# Patient Record
Sex: MALE | Race: WHITE
[De-identification: names, ages, dates, MRNs, and addresses within clinical notes are randomized per-mention and may not be internally consistent; named-entity substitution may affect disease eponyms.]

---

## 2019-03-04 ENCOUNTER — HOSPITAL ENCOUNTER (INPATIENT)
Dept: HOSPITAL 41 - JD.ED | Age: 79
LOS: 3 days | Discharge: NURSING, REHAB OR LONG-TERM CARE | DRG: 64 | End: 2019-03-07
Attending: INTERNAL MEDICINE | Admitting: INTERNAL MEDICINE
Payer: MEDICARE

## 2019-03-04 DIAGNOSIS — M19.90: ICD-10-CM

## 2019-03-04 DIAGNOSIS — R20.0: ICD-10-CM

## 2019-03-04 DIAGNOSIS — K25.9: ICD-10-CM

## 2019-03-04 DIAGNOSIS — E78.5: ICD-10-CM

## 2019-03-04 DIAGNOSIS — F41.9: ICD-10-CM

## 2019-03-04 DIAGNOSIS — H54.7: ICD-10-CM

## 2019-03-04 DIAGNOSIS — F32.9: ICD-10-CM

## 2019-03-04 DIAGNOSIS — C61: ICD-10-CM

## 2019-03-04 DIAGNOSIS — K26.4: ICD-10-CM

## 2019-03-04 DIAGNOSIS — I21.4: ICD-10-CM

## 2019-03-04 DIAGNOSIS — K44.9: ICD-10-CM

## 2019-03-04 DIAGNOSIS — K21.9: ICD-10-CM

## 2019-03-04 DIAGNOSIS — S61.511A: ICD-10-CM
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
## 2019-03-04 DIAGNOSIS — F17.210: ICD-10-CM

## 2019-03-04 DIAGNOSIS — D49.0: ICD-10-CM

## 2019-03-04 DIAGNOSIS — Z79.899: ICD-10-CM

## 2019-03-04 DIAGNOSIS — E87.6: ICD-10-CM

## 2019-03-04 DIAGNOSIS — I63.9: Primary | ICD-10-CM

## 2019-03-04 DIAGNOSIS — E86.0: ICD-10-CM

## 2019-03-04 DIAGNOSIS — G81.01: ICD-10-CM

## 2019-03-04 DIAGNOSIS — I10: ICD-10-CM

## 2019-03-04 DIAGNOSIS — D50.0: ICD-10-CM

## 2019-03-04 DIAGNOSIS — G81.91: ICD-10-CM

## 2019-03-04 DIAGNOSIS — S61.411A: ICD-10-CM

## 2019-03-04 DIAGNOSIS — R53.1: ICD-10-CM

## 2019-03-04 LAB — APAP SERPL-MCNC: 0 UG/ML (ref 10–30)

## 2019-03-04 PROCEDURE — G0480 DRUG TEST DEF 1-7 CLASSES: HCPCS

## 2019-03-04 PROCEDURE — 85610 PROTHROMBIN TIME: CPT

## 2019-03-04 PROCEDURE — 85025 COMPLETE CBC W/AUTO DIFF WBC: CPT

## 2019-03-04 PROCEDURE — P9016 RBC LEUKOCYTES REDUCED: HCPCS

## 2019-03-04 PROCEDURE — 85730 THROMBOPLASTIN TIME PARTIAL: CPT

## 2019-03-04 PROCEDURE — 83735 ASSAY OF MAGNESIUM: CPT

## 2019-03-04 PROCEDURE — 80053 COMPREHEN METABOLIC PANEL: CPT

## 2019-03-04 PROCEDURE — A9577 INJ MULTIHANCE: HCPCS

## 2019-03-04 PROCEDURE — 82962 GLUCOSE BLOOD TEST: CPT

## 2019-03-04 PROCEDURE — 81001 URINALYSIS AUTO W/SCOPE: CPT

## 2019-03-04 PROCEDURE — 96365 THER/PROPH/DIAG IV INF INIT: CPT

## 2019-03-04 PROCEDURE — 99285 EMERGENCY DEPT VISIT HI MDM: CPT

## 2019-03-04 PROCEDURE — 84443 ASSAY THYROID STIM HORMONE: CPT

## 2019-03-04 PROCEDURE — 36415 COLL VENOUS BLD VENIPUNCTURE: CPT

## 2019-03-04 PROCEDURE — C9113 INJ PANTOPRAZOLE SODIUM, VIA: HCPCS

## 2019-03-04 PROCEDURE — 93005 ELECTROCARDIOGRAM TRACING: CPT

## 2019-03-04 PROCEDURE — 80306 DRUG TEST PRSMV INSTRMNT: CPT

## 2019-03-04 PROCEDURE — 84484 ASSAY OF TROPONIN QUANT: CPT

## 2019-03-04 RX ADMIN — POTASSIUM CHLORIDE SCH MEQ: 1500 TABLET, EXTENDED RELEASE ORAL at 21:11

## 2019-03-04 NOTE — EDM.PDOC
ED HPI GENERAL MEDICAL PROBLEM





- General


Chief Complaint: Neuro Symptoms/Deficits


Stated Complaint: [facility]


Time Seen by Provider: 03/04/19 16:08


Source of Information: Reports: Patient, EMS


History Limitations: Reports: No Limitations





- History of Present Illness


INITIAL COMMENTS - FREE TEXT/NARRATIVE: 





The patient presents by [facility] for a stroke.  The patient says 

this started Thursday night right after dinner.  His last time know well was 

6pm.  His right arm and leg quit working.  He has severe weakness to his right 

arm and moderate weakness to his right leg.  He knew he had a stroke he went to 

bed and did not call anyone for help.  He lives alone.  He got depressed on 

Saturday and cut his arm in 3 places.  Twice to the right wrist and once to the 

right AC.  He was trying to kill himself but he did not bleed that much.  He 

has no headache, fever, chills, cough, chest pain, shortness of breath, 

abdominal pain, nausea or vomiting.  He called 911 for help.  He has to crawl 

in his house to get around.  He new cutting himself was wrong.  He has not 

eaten or drank much the past few days.  He has prostate cancer and he is on 

medication for that and high blood pressure.  His PSA has gone up so his 

urologist is coming up with a different plan.  He does admit to drinking the 

past few days.


Onset: Sudden


Duration: Day(s): (5)


Severity: Severe


Improves with: Reports: None


Worsens with: Reports: None


Associated Symptoms: Reports: No Other Symptoms





- Related Data


 Allergies











Allergy/AdvReac Type Severity Reaction Status Date / Time


 


No Known Allergies Allergy   Verified 03/04/19 16:13











Home Meds: 


 Home Meds





Degarelix [Firmagon] 80 mg SQ ASDIRECTED 03/04/19 [History]


Losartan/Hydrochlorothiazide [Hyzaar 100-12.5 Tablet] 1 each PO DAILY 03/04/19 [

History]


amLODIPine Besylate [Amlodipine Besylate] 5 mg PO DAILY 03/04/19 [History]











Past Medical History


Cardiovascular History: Reports: Hypertension


Oncologic (Cancer) History: Reports: Prostate





- Past Surgical History


Male  Surgical History: Reports: Other (See Below)


Other Male  Surgeries/Procedures: patient has seeds placed for prostrate 

cancer.





Social & Family History





- Tobacco Use


Smoking Status *Q: Current Every Day Smoker


Years of Tobacco use: 62


Packs/Tins Daily: 1


Used Tobacco, but Quit: No





- Caffeine Use


Caffeine Use: Reports: Coffee, Soda





- Alcohol Use


Number of Drinks Per Day: 4


Date of Last Drink: 03/04/19





- Recreational Drug Use


Recreational Drug Use: No





ED ROS GENERAL





- Review of Systems


Review Of Systems: See Below


Constitutional: Reports: No Symptoms


HEENT: Reports: No Symptoms


Respiratory: Reports: No Symptoms


Cardiovascular: Reports: No Symptoms


Endocrine: Reports: No Symptoms


GI/Abdominal: Reports: No Symptoms


: Reports: No Symptoms


Neurological: Reports: Other (Right arm and leg weakness with numbness)





ED EXAM, NEURO





- Physical Exam


Exam: See Below


Exam Limited By: No Limitations


General Appearance: Alert, No Apparent Distress


Ears: Normal External Exam


Nose: Normal Inspection


Head Exam: Atraumatic, Normocephalic


Neck: Normal Inspection


Respiratory/Chest: No Respiratory Distress, Lungs Clear, Normal Breath Sounds


Cardiovascular: Regular Rate, Rhythm, No Edema, No Murmur


GI/Abdominal: Soft, Non-Tender, No Organomegaly, No Mass


Neurological: Alert, Other (Severe weakness to his hand and wrist on the right.

  Moderate weakness to the shoulder.  He can lift his arm up.  He has moderate 

weakness to his right leg.)


Extremities: Other (1cm laceration to the right AC, two 1cm lacerations to the 

right wrist)





EKG INTERPRETATION


EKG Date: 03/04/19


Time: 16:24


Rhythm: Other (sinus tachycardia)


Rate (Beats/Min): 120


Axis: Normal


P-Wave: Present


QRS: Normal


ST-T: Depressed (Global ST depression)


QT: Normal





Course





- Vital Signs


Last Recorded V/S: 


 Last Vital Signs











Temp  98.5 F   03/04/19 16:06


 


Pulse  121 H  03/04/19 16:06


 


Resp  18   03/04/19 16:06


 


BP  137/84   03/04/19 16:06


 


Pulse Ox  98   03/04/19 16:06














- Orders/Labs/Meds


Orders: 


 Active Orders 24 hr











 Category Date Time Status


 


 Cardiac Monitoring [RC] .AS DIRECTED Care  03/04/19 16:09 Active


 


 EKG Documentation Completion [RC] STAT Care  03/04/19 16:09 Active


 


 Peripheral IV Care [RC] .AS DIRECTED Care  03/04/19 16:09 Active


 


 Head wo Cont [CT] Routine Exams  03/04/19 16:11 Taken


 


 DRUG SCREEN, URINE [URCHEM] Stat Lab  03/04/19 17:52 Ordered


 


 DRUG SCREEN, URINE [URCHEM] Stat Lab  03/04/19 17:52 Ordered


 


 NS + KCl 20mEq/L [Normal Saline with 20 mEq KCl] 1,000 Med  03/04/19 18:00 

Active





 ml   





 IV ASDIRECTED   


 


 Sodium Chloride 0.9% [Saline Flush] Med  03/04/19 16:09 Active





 10 ml FLUSH ASDIRECTED PRN   


 


 Peripheral IV Insertion Adult [OM.PC] Stat Oth  03/04/19 16:09 Ordered








 Medication Orders





Potassium Chloride/Sodium Chloride (Normal Saline With 20 Meq Kcl)  1,000 mls @ 

150 mls/hr IV ASDIRECTED NICOLETTE


   Last Admin: 03/04/19 18:03  Dose: 150 mls/hr


Sodium Chloride (Saline Flush)  10 ml FLUSH ASDIRECTED PRN


   PRN Reason: Keep Vein Open


   Last Admin: 03/04/19 16:28  Dose: 10 ml








Labs: 


 Laboratory Tests











  03/04/19 03/04/19 03/04/19 Range/Units





  16:15 16:35 16:35 


 


WBC   12.79 H   (4.23-9.07)  K/mm3


 


RBC   3.98 L   (4.63-6.08)  M/mm3


 


Hgb   11.9 L   (13.7-17.5)  gm/L


 


Hct   35.4 L   (40.1-51.0)  %


 


MCV   88.9   (79.0-92.2)  fl


 


MCH   29.9   (25.7-32.2)  pg


 


MCHC   33.6   (32.2-35.5)  g/dl


 


RDW Std Deviation   44.6 H   (35.1-43.9)  fL


 


Plt Count   278   (163-337)  K/mm3


 


MPV   10.1   (9.4-12.3)  fl


 


Neut % (Auto)   77.7 H   (34.0-67.9)  %


 


Lymph % (Auto)   14.5 L   (21.8-53.1)  %


 


Mono % (Auto)   7.2   (5.3-12.2)  %


 


Eos % (Auto)   0.2 L   (0.8-7.0)  


 


Baso % (Auto)   0.2   (0.1-1.2)  %


 


Neut # (Auto)   9.95 H   (1.78-5.38)  K/mm3


 


Lymph # (Auto)   1.86   (1.32-3.57)  K/mm3


 


Mono # (Auto)   0.92 H   (0.30-0.82)  K/mm3


 


Eos # (Auto)   0.02 L   (0.04-0.54)  K/mm3


 


Baso # (Auto)   0.02   (0.01-0.08)  K/mm3


 


PT    10.9  (9.5-12.1)  SECONDS


 


INR    1.00  


 


APTT    25  (24-31)  SECONDS


 


Sodium     (136-145)  mEq/L


 


Potassium     (3.5-5.1)  mEq/L


 


Chloride     ()  mEq/L


 


Carbon Dioxide     (21-32)  mEq/L


 


Anion Gap     (5-15)  


 


BUN     (7-18)  mg/dL


 


Creatinine     (0.7-1.3)  mg/dL


 


Est Cr Clr Drug Dosing     mL/min


 


Estimated GFR (MDRD)     (>60)  mL/min


 


BUN/Creatinine Ratio     (14-18)  


 


Glucose     ()  mg/dL


 


POC Glucose  124 H    ()  mg/dL


 


Calcium     (8.5-10.1)  mg/dL


 


Total Bilirubin     (0.2-1.0)  mg/dL


 


AST     (15-37)  U/L


 


ALT     (16-63)  U/L


 


Alkaline Phosphatase     ()  U/L


 


Troponin I     (0.00-0.056)  ng/mL


 


Total Protein     (6.4-8.2)  g/dl


 


Albumin     (3.4-5.0)  g/dl


 


Globulin     gm/dL


 


Albumin/Globulin Ratio     (1-2)  


 


TSH 3rd Generation     (0.358-3.74)  uIU/mL


 


Salicylates     (2.8-20)  mg/dL


 


Acetaminophen     (10-30)  ug/mL


 


Ethyl Alcohol     (0.00)  gm%














  03/04/19 03/04/19 Range/Units





  16:35 16:35 


 


WBC    (4.23-9.07)  K/mm3


 


RBC    (4.63-6.08)  M/mm3


 


Hgb    (13.7-17.5)  gm/L


 


Hct    (40.1-51.0)  %


 


MCV    (79.0-92.2)  fl


 


MCH    (25.7-32.2)  pg


 


MCHC    (32.2-35.5)  g/dl


 


RDW Std Deviation    (35.1-43.9)  fL


 


Plt Count    (163-337)  K/mm3


 


MPV    (9.4-12.3)  fl


 


Neut % (Auto)    (34.0-67.9)  %


 


Lymph % (Auto)    (21.8-53.1)  %


 


Mono % (Auto)    (5.3-12.2)  %


 


Eos % (Auto)    (0.8-7.0)  


 


Baso % (Auto)    (0.1-1.2)  %


 


Neut # (Auto)    (1.78-5.38)  K/mm3


 


Lymph # (Auto)    (1.32-3.57)  K/mm3


 


Mono # (Auto)    (0.30-0.82)  K/mm3


 


Eos # (Auto)    (0.04-0.54)  K/mm3


 


Baso # (Auto)    (0.01-0.08)  K/mm3


 


PT    (9.5-12.1)  SECONDS


 


INR    


 


APTT    (24-31)  SECONDS


 


Sodium  140   (136-145)  mEq/L


 


Potassium  2.5 L   (3.5-5.1)  mEq/L


 


Chloride  99   ()  mEq/L


 


Carbon Dioxide  32   (21-32)  mEq/L


 


Anion Gap  11.5   (5-15)  


 


BUN  45 H   (7-18)  mg/dL


 


Creatinine  1.3   (0.7-1.3)  mg/dL


 


Est Cr Clr Drug Dosing  39.69   mL/min


 


Estimated GFR (MDRD)  54   (>60)  mL/min


 


BUN/Creatinine Ratio  34.6 H   (14-18)  


 


Glucose  116 H   ()  mg/dL


 


POC Glucose    ()  mg/dL


 


Calcium  8.8   (8.5-10.1)  mg/dL


 


Total Bilirubin  1.2 H   (0.2-1.0)  mg/dL


 


AST  21   (15-37)  U/L


 


ALT  18   (16-63)  U/L


 


Alkaline Phosphatase  78   ()  U/L


 


Troponin I  < 0.017   (0.00-0.056)  ng/mL


 


Total Protein  6.4   (6.4-8.2)  g/dl


 


Albumin  3.1 L   (3.4-5.0)  g/dl


 


Globulin  3.3   gm/dL


 


Albumin/Globulin Ratio  0.9 L   (1-2)  


 


TSH 3rd Generation  2.290   (0.358-3.74)  uIU/mL


 


Salicylates   0.5 L  (2.8-20)  mg/dL


 


Acetaminophen  0 L   (10-30)  ug/mL


 


Ethyl Alcohol  0.00   (0.00)  gm%











Meds: 


Medications











Generic Name Dose Route Start Last Admin





  Trade Name Freq  PRN Reason Stop Dose Admin


 


Potassium Chloride/Sodium Chloride  1,000 mls @ 150 mls/hr  03/04/19 18:00  03/ 04/19 18:03





  Normal Saline With 20 Meq Kcl  IV   150 mls/hr





  ASDIRECTED NICOLETTE   Administration





     





     





     





     


 


Sodium Chloride  10 ml  03/04/19 16:09  03/04/19 16:28





  Saline Flush  FLUSH   10 ml





  ASDIRECTED PRN   Administration





  Keep Vein Open   





     





     





     














- Re-Assessments/Exams


Free Text/Narrative Re-Assessment/Exam: 





03/04/19 17:39


I ordered an IV saline lock, EKG, CT of his head, and labs.  His EKG shows a 

sinus tachycardia with some ST depression globally.  His CT shows senescent 

change but no acute intracranial abnormality is identified on noncontrast head 

CT exam.  His WBC was elevated at 12.79.  His Hgb was low at 11.9.  His PT, INR 

and PTT were normal.  His K was low at 2.5.  I ordered IV NS with 20 of KCL at 

150mL/hr.  His glucose was 124.  His TSH was normal.  His ETOH was negative.  

His salicylates and acetaminophen were normal.  I feel he needs to be admitted.

  I called Dr Nguyễn and she agreed to the admission.  I talked to him about 

code status.  He wants to be full code.  He is not suicidal at this time.  He 

admits it was a mistake.





Departure





- Departure


Time of Disposition: 18:20


Disposition: Admitted As Inpatient 67


Condition: Fair


Clinical Impression: 


 Hypokalemia, Suicidal ideation





Cerebrovascular accident (CVA)


Qualifiers:


 CVA mechanism: unspecified Qualified Code(s): I63.9 - Cerebral infarction, 

unspecified





Laceration of right upper arm


Qualifiers:


 Encounter type: initial encounter Qualified Code(s): S41.111A - Laceration 

without foreign body of right upper arm, initial encounter








- Discharge Information


Referrals: 


Dominic Brandt MD [Primary Care Provider] - 


Forms:  ED Department Discharge





- My Orders


Last 24 Hours: 


My Active Orders





03/04/19 16:09


Cardiac Monitoring [RC] .AS DIRECTED 


EKG Documentation Completion [RC] STAT 


Peripheral IV Care [RC] .AS DIRECTED 


Sodium Chloride 0.9% [Saline Flush]   10 ml FLUSH ASDIRECTED PRN 


Peripheral IV Insertion Adult [OM.PC] Stat 





03/04/19 16:11


Head wo Cont [CT] Routine 





03/04/19 17:52


DRUG SCREEN, URINE [URCHEM] Stat 


DRUG SCREEN, URINE [URCHEM] Stat 





03/04/19 18:00


NS + KCl 20mEq/L [Normal Saline with 20 mEq KCl] 1,000 ml IV ASDIRECTED 














- Assessment/Plan


Last 24 Hours: 


My Active Orders





03/04/19 16:09


Cardiac Monitoring [RC] .AS DIRECTED 


EKG Documentation Completion [RC] STAT 


Peripheral IV Care [RC] .AS DIRECTED 


Sodium Chloride 0.9% [Saline Flush]   10 ml FLUSH ASDIRECTED PRN 


Peripheral IV Insertion Adult [OM.PC] Stat 





03/04/19 16:11


Head wo Cont [CT] Routine 





03/04/19 17:52


DRUG SCREEN, URINE [URCHEM] Stat 


DRUG SCREEN, URINE [URCHEM] Stat 





03/04/19 18:00


NS + KCl 20mEq/L [Normal Saline with 20 mEq KCl] 1,000 ml IV ASDIRECTED

## 2019-03-04 NOTE — PCM.HP
H&P History of Present Illness





- General


Date of Service: 03/04/19


Admit Problem/Dx: 


 Admission Diagnosis/Problem





Admission Diagnosis/Problem      CVA, Cerebrovascular accident








Source of Information: Patient, Provider





- History of Present Illness


Initial Comments - Free Text/Narative: 








78 year old male, right handed with history of end stage prostate cancer , 

presented after cutting his arms because, "I wanted to bleed to death."  He 

apparently noticed that he was unable to move his upper and lower extremities 

starting Thursday.   It is unclear when he may have wanted to harm himself;  he 

lives alone. The patient is a full code and does not want his family notified 

regarding his admission. He is outside the time frame for a thrombolytic.  


Onset of Symptoms: Reports: Unknown/Unsure


Symptom Onset Date: 02/28/19


Duration of Symptoms: Reports: Day(s):, Getting Worse


Location: Reports: Upper Extremity, Right, Lower Extremity, Left, Generalized


Severity: Moderate


Improves with: Reports: None


Worsens with: Reports: None


Associated Symptoms: Reports: Weakness





- Related Data


Allergies/Adverse Reactions: 


 Allergies











Allergy/AdvReac Type Severity Reaction Status Date / Time


 


No Known Allergies Allergy   Verified 03/04/19 16:13











Home Medications: 


 Home Meds





Degarelix [Firmagon] 80 mg SQ ASDIRECTED 03/04/19 [History]


Losartan/Hydrochlorothiazide [Hyzaar 100-12.5 Tablet] 1 each PO DAILY 03/04/19 [

History]


amLODIPine Besylate [Amlodipine Besylate] 5 mg PO DAILY 03/04/19 [History]











Past Medical History


Cardiovascular History: Reports: Hypertension


Oncologic (Cancer) History: Reports: Prostate





- Past Surgical History


Male  Surgical History: Reports: Other (See Below)


Other Male  Surgeries/Procedures: patient has seeds placed for prostrate 

cancer.





Social & Family History





- Tobacco Use


Smoking Status *Q: Current Every Day Smoker


Years of Tobacco use: 62


Packs/Tins Daily: 1


Used Tobacco, but Quit: No





- Caffeine Use


Caffeine Use: Reports: Coffee, Soda





- Alcohol Use


Number of Drinks Per Day: 4


Date of Last Drink: 03/04/19





- Recreational Drug Use


Recreational Drug Use: No





H&P Review of Systems





- Review of Systems:


Review Of Systems: See Below


General: Reports: Weakness, Weight Loss


HEENT: Reports: No Symptoms


Pulmonary: Reports: No Symptoms


Cardiovascular: Reports: No Symptoms


Gastrointestinal: Reports: No Symptoms, Mucous in Stool


Musculoskeletal: Reports: No Symptoms


Skin: Reports: No Symptoms


Psychiatric: Reports: No Symptoms


Neurological: Reports: Other (flacid RUE)


Hematologic/Lymphatic: Reports: No Symptoms


Immunologic: Reports: No Symptoms





Exam





- Exam


Exam: See Below





- Vital Signs


Vital Signs: 


 Last Vital Signs











Temp  36.9 C   03/04/19 16:06


 


Pulse  121 H  03/04/19 16:06


 


Resp  18   03/04/19 16:06


 


BP  137/84   03/04/19 16:06


 


Pulse Ox  98   03/04/19 16:06











Weight: 58.967 kg





- Exam


Quality Assessment: Supplemental Oxygen, DVT Prophylaxis


General: Alert, Oriented, Cooperative


HEENT: EOMI, Nares Patent, Normal Nasal Septum, Pupils Equal, Pupils Reactive, 

PERRLA


Neck: Trachea Midline


Lungs: Clear to Auscultation, Normal Respiratory Effort


Cardiovascular: Regular Rate


GI/Abdominal Exam: Normal Bowel Sounds, Soft, Non-Tender, No Organomegaly, No 

Distention


 (Male) Exam: Deferred


Rectal (Males) Exam: Deferred


Back Exam: Normal Inspection


Extremities: Normal Inspection, Non-Tender, Slow Capillary Refill


Skin: Warm


Neurological: Cranial Nerves Intact


Neuro Extensive - Mental Status: Alert, Oriented x3


Neuro Extensive - Motor, Sensory, Reflexes: CN II-XII Intact, Other (flacid RUE

;  LUE 2/5), Motor/Sensory Deficits


Psychiatric: Alert, Depressed





- Patient Data


Lab Results Last 24 hrs: 


 Laboratory Results - last 24 hr











  03/04/19 03/04/19 03/04/19 Range/Units





  16:15 16:35 16:35 


 


WBC   12.79 H   (4.23-9.07)  K/mm3


 


RBC   3.98 L   (4.63-6.08)  M/mm3


 


Hgb   11.9 L   (13.7-17.5)  gm/L


 


Hct   35.4 L   (40.1-51.0)  %


 


MCV   88.9   (79.0-92.2)  fl


 


MCH   29.9   (25.7-32.2)  pg


 


MCHC   33.6   (32.2-35.5)  g/dl


 


RDW Std Deviation   44.6 H   (35.1-43.9)  fL


 


Plt Count   278   (163-337)  K/mm3


 


MPV   10.1   (9.4-12.3)  fl


 


Neut % (Auto)   77.7 H   (34.0-67.9)  %


 


Lymph % (Auto)   14.5 L   (21.8-53.1)  %


 


Mono % (Auto)   7.2   (5.3-12.2)  %


 


Eos % (Auto)   0.2 L   (0.8-7.0)  


 


Baso % (Auto)   0.2   (0.1-1.2)  %


 


Neut # (Auto)   9.95 H   (1.78-5.38)  K/mm3


 


Lymph # (Auto)   1.86   (1.32-3.57)  K/mm3


 


Mono # (Auto)   0.92 H   (0.30-0.82)  K/mm3


 


Eos # (Auto)   0.02 L   (0.04-0.54)  K/mm3


 


Baso # (Auto)   0.02   (0.01-0.08)  K/mm3


 


PT    10.9  (9.5-12.1)  SECONDS


 


INR    1.00  


 


APTT    25  (24-31)  SECONDS


 


Sodium     (136-145)  mEq/L


 


Potassium     (3.5-5.1)  mEq/L


 


Chloride     ()  mEq/L


 


Carbon Dioxide     (21-32)  mEq/L


 


Anion Gap     (5-15)  


 


BUN     (7-18)  mg/dL


 


Creatinine     (0.7-1.3)  mg/dL


 


Est Cr Clr Drug Dosing     mL/min


 


Estimated GFR (MDRD)     (>60)  mL/min


 


BUN/Creatinine Ratio     (14-18)  


 


Glucose     ()  mg/dL


 


POC Glucose  124 H    ()  mg/dL


 


Calcium     (8.5-10.1)  mg/dL


 


Total Bilirubin     (0.2-1.0)  mg/dL


 


AST     (15-37)  U/L


 


ALT     (16-63)  U/L


 


Alkaline Phosphatase     ()  U/L


 


Troponin I     (0.00-0.056)  ng/mL


 


Total Protein     (6.4-8.2)  g/dl


 


Albumin     (3.4-5.0)  g/dl


 


Globulin     gm/dL


 


Albumin/Globulin Ratio     (1-2)  


 


TSH 3rd Generation     (0.358-3.74)  uIU/mL


 


Salicylates     (2.8-20)  mg/dL


 


Urine Opiates Screen     (SYGZXQ=997)  


 


Ur Buprenorphine Scrn     (CUTOFF=10)  


 


Ur Oxycodone Screen     (OML9XR=494)  


 


Urine Methadone Screen     (YIT9BQ=823)  


 


Ur Propoxyphene Screen     (CCUXZY=278)  


 


Acetaminophen     (10-30)  ug/mL


 


Ur Barbiturates Screen     (TRRCTC=460)  


 


Ur Tricyclics Screen     (TOCWWQ=989)  


 


Ur Phencyclidine Scrn     (CUTOFF=25)  


 


Ur Amphetamine Screen     (MBUEWH=085)  


 


U Methamphetamines Scrn     (UUNEBP=011)  


 


U Benzodiazepines Scrn     (PHEBSM=088)  


 


U Cocaine Metab Screen     (DNUSJX=868)  


 


U Marijuana (THC) Screen     (CUTOFF=50)  


 


Ethyl Alcohol     (0.00)  gm%














  03/04/19 03/04/19 03/04/19 Range/Units





  16:35 16:35 17:47 


 


WBC     (4.23-9.07)  K/mm3


 


RBC     (4.63-6.08)  M/mm3


 


Hgb     (13.7-17.5)  gm/L


 


Hct     (40.1-51.0)  %


 


MCV     (79.0-92.2)  fl


 


MCH     (25.7-32.2)  pg


 


MCHC     (32.2-35.5)  g/dl


 


RDW Std Deviation     (35.1-43.9)  fL


 


Plt Count     (163-337)  K/mm3


 


MPV     (9.4-12.3)  fl


 


Neut % (Auto)     (34.0-67.9)  %


 


Lymph % (Auto)     (21.8-53.1)  %


 


Mono % (Auto)     (5.3-12.2)  %


 


Eos % (Auto)     (0.8-7.0)  


 


Baso % (Auto)     (0.1-1.2)  %


 


Neut # (Auto)     (1.78-5.38)  K/mm3


 


Lymph # (Auto)     (1.32-3.57)  K/mm3


 


Mono # (Auto)     (0.30-0.82)  K/mm3


 


Eos # (Auto)     (0.04-0.54)  K/mm3


 


Baso # (Auto)     (0.01-0.08)  K/mm3


 


PT     (9.5-12.1)  SECONDS


 


INR     


 


APTT     (24-31)  SECONDS


 


Sodium  140    (136-145)  mEq/L


 


Potassium  2.5 L    (3.5-5.1)  mEq/L


 


Chloride  99    ()  mEq/L


 


Carbon Dioxide  32    (21-32)  mEq/L


 


Anion Gap  11.5    (5-15)  


 


BUN  45 H    (7-18)  mg/dL


 


Creatinine  1.3    (0.7-1.3)  mg/dL


 


Est Cr Clr Drug Dosing  39.69    mL/min


 


Estimated GFR (MDRD)  54    (>60)  mL/min


 


BUN/Creatinine Ratio  34.6 H    (14-18)  


 


Glucose  116 H    ()  mg/dL


 


POC Glucose     ()  mg/dL


 


Calcium  8.8    (8.5-10.1)  mg/dL


 


Total Bilirubin  1.2 H    (0.2-1.0)  mg/dL


 


AST  21    (15-37)  U/L


 


ALT  18    (16-63)  U/L


 


Alkaline Phosphatase  78    ()  U/L


 


Troponin I  < 0.017    (0.00-0.056)  ng/mL


 


Total Protein  6.4    (6.4-8.2)  g/dl


 


Albumin  3.1 L    (3.4-5.0)  g/dl


 


Globulin  3.3    gm/dL


 


Albumin/Globulin Ratio  0.9 L    (1-2)  


 


TSH 3rd Generation  2.290    (0.358-3.74)  uIU/mL


 


Salicylates   0.5 L   (2.8-20)  mg/dL


 


Urine Opiates Screen    Negative  (QIJGUO=827)  


 


Ur Buprenorphine Scrn    Negative  (CUTOFF=10)  


 


Ur Oxycodone Screen    Negative  (LLS8GL=585)  


 


Urine Methadone Screen    Negative  (HDV0YM=809)  


 


Ur Propoxyphene Screen    Negative  (UONNZF=652)  


 


Acetaminophen  0 L    (10-30)  ug/mL


 


Ur Barbiturates Screen    Negative  (KKMHMW=040)  


 


Ur Tricyclics Screen    Negative  (DSMBUW=464)  


 


Ur Phencyclidine Scrn    Negative  (CUTOFF=25)  


 


Ur Amphetamine Screen    Negative  (UJSFCY=138)  


 


U Methamphetamines Scrn    Negative  (BSGEWE=904)  


 


U Benzodiazepines Scrn    Negative  (DNDZRV=160)  


 


U Cocaine Metab Screen    Negative  (MTCKCF=648)  


 


U Marijuana (THC) Screen    Negative  (CUTOFF=50)  


 


Ethyl Alcohol  0.00    (0.00)  gm%











Result Diagrams: 


 03/05/19 06:05





 03/05/19 06:05





- Problem List


(1) Tobacco dependence


SNOMED Code(s): 76634269


   ICD Code: F17.200 - NICOTINE DEPENDENCE, UNSPECIFIED, UNCOMPLICATED   Status

: Acute   Current Visit: Yes   





(2) Prostate cancer


SNOMED Code(s): 844899195


   ICD Code: C61 - MALIGNANT NEOPLASM OF PROSTATE   Status: Acute   Current 

Visit: Yes   





(3) Cerebrovascular accident (CVA)


SNOMED Code(s): 604555339


   ICD Code: I63.9 - CEREBRAL INFARCTION, UNSPECIFIED   Status: Acute   Current 

Visit: Yes   


Qualifiers: 


   CVA mechanism: unspecified   Qualified Code(s): I63.9 - Cerebral infarction, 

unspecified   





(4) Hypokalemia


SNOMED Code(s): 48705613


   ICD Code: E87.6 - HYPOKALEMIA   Status: Acute   Current Visit: Yes   





(5) Laceration of right upper arm


SNOMED Code(s): 731028396


   ICD Code: S41.111A - LACERATION W/O FOREIGN BODY OF RIGHT UPPER ARM, INIT 

ENCNTR   Status: Acute   Current Visit: Yes   


Qualifiers: 


   Encounter type: initial encounter   Qualified Code(s): S41.111A - Laceration 

without foreign body of right upper arm, initial encounter   





(6) Suicidal ideation


SNOMED Code(s): 6377920


   ICD Code: R45.851 - SUICIDAL IDEATIONS   Status: Acute   Current Visit: Yes 

  


Problem List Initiated/Reviewed/Updated: Yes


Orders Last 24hrs: 


 Active Orders 24 hr











 Category Date Time Status


 


 Patient Status [ADT] Routine ADT  03/04/19 18:53 Active


 


 Cardiac Monitoring [RC] .AS DIRECTED Care  03/04/19 16:09 Active


 


 EKG Documentation Completion [RC] STAT Care  03/04/19 16:09 Active


 


 Peripheral IV Care [RC] .AS DIRECTED Care  03/04/19 16:09 Active


 


 Head wo Cont [CT] Routine Exams  03/04/19 16:11 Taken


 


 NS + KCl 20mEq/L [Normal Saline with 20 mEq KCl] 1,000 Med  03/04/19 18:00 

Active





 ml   





 IV ASDIRECTED   


 


 Sodium Chloride 0.9% [Saline Flush] Med  03/04/19 16:09 Active





 10 ml FLUSH ASDIRECTED PRN   


 


 Peripheral IV Insertion Adult [OM.PC] Stat Oth  03/04/19 16:09 Ordered








 Medication Orders





Potassium Chloride/Sodium Chloride (Normal Saline With 20 Meq Kcl)  1,000 mls @ 

150 mls/hr IV ASDIRECTED NICOLETTE


   Last Admin: 03/04/19 18:03  Dose: 150 mls/hr


Sodium Chloride (Saline Flush)  10 ml FLUSH ASDIRECTED PRN


   PRN Reason: Keep Vein Open


   Last Admin: 03/04/19 16:28  Dose: 10 ml








Assessment/Plan Comment:: 











Impression:





Suicidal ideation, history of end stage prostate cancer per patient report


Scheduled for PET scan this week;  CTX, Degarelix


S/P bilateral extremity lacerations





CVA, subacute with predominately right sided deficits





Dehydration





Hypokalemia





Elevated WBCs, query reactive cf infectious











Chronic


HTN


HLD








Plan:





IVF


CVA protocol


MRI re; mets and CVA.


Bedside swallow evaluation


Ischemic work up;  ECG generalized ST depression, Sinus tach


Lipid panel


EMR from oncologist


Psychiatric consult;  re: depression with suicidal thoughts/gesture


Nicotine replacement


Full code status


DVT prophylaxis

## 2019-03-05 RX ADMIN — POTASSIUM CHLORIDE SCH MEQ: 1500 TABLET, EXTENDED RELEASE ORAL at 20:12

## 2019-03-05 RX ADMIN — POTASSIUM CHLORIDE SCH MEQ: 1500 TABLET, EXTENDED RELEASE ORAL at 09:24

## 2019-03-05 RX ADMIN — Medication PRN ML: at 08:31

## 2019-03-05 NOTE — PCM.PN
- General Info


Functional Status: Reports: Tolerating Diet, Urinating





- Review of Systems


General: Reports: Weakness


HEENT: Reports: No Symptoms


Pulmonary: Reports: No Symptoms


Cardiovascular: Reports: No Symptoms


Gastrointestinal: Reports: No Symptoms


Genitourinary: Reports: No Symptoms


Musculoskeletal: Reports: No Symptoms


Skin: Reports: No Symptoms


Neurological: Reports: Pre-Existing Deficit, Weakness


Psychiatric: Reports: No Symptoms





- Patient Data


Vitals - Most Recent: 


 Last Vital Signs











Temp  36.9 C   03/05/19 16:02


 


Pulse  66   03/05/19 16:02


 


Resp  14   03/05/19 16:02


 


BP  164/74 H  03/05/19 16:04


 


Pulse Ox  97   03/05/19 16:02











Weight - Most Recent: 58.967 kg


I&O - Last 24 Hours: 


 Intake & Output











 03/05/19 03/05/19 03/05/19





 06:59 14:59 22:59


 


Intake Total 2732 120 700


 


Output Total 200  350


 


Balance 2532 120 350











Lab Results Last 24 Hours: 


 Laboratory Results - last 24 hr











  03/04/19 03/04/19 03/05/19 Range/Units





  16:35 17:47 06:05 


 


WBC     (4.23-9.07)  K/mm3


 


RBC     (4.63-6.08)  M/mm3


 


Hgb     (13.7-17.5)  gm/L


 


Hct     (40.1-51.0)  %


 


MCV     (79.0-92.2)  fl


 


MCH     (25.7-32.2)  pg


 


MCHC     (32.2-35.5)  g/dl


 


RDW Std Deviation     (35.1-43.9)  fL


 


Plt Count     (163-337)  K/mm3


 


MPV     (9.4-12.3)  fl


 


Neut % (Auto)     (34.0-67.9)  %


 


Lymph % (Auto)     (21.8-53.1)  %


 


Mono % (Auto)     (5.3-12.2)  %


 


Eos % (Auto)     (0.8-7.0)  


 


Baso % (Auto)     (0.1-1.2)  %


 


Neut # (Auto)     (1.78-5.38)  K/mm3


 


Lymph # (Auto)     (1.32-3.57)  K/mm3


 


Mono # (Auto)     (0.30-0.82)  K/mm3


 


Eos # (Auto)     (0.04-0.54)  K/mm3


 


Baso # (Auto)     (0.01-0.08)  K/mm3


 


Sodium     (136-145)  mEq/L


 


Potassium     (3.5-5.1)  mEq/L


 


Chloride     ()  mEq/L


 


Carbon Dioxide     (21-32)  mEq/L


 


Anion Gap     (5-15)  


 


BUN     (7-18)  mg/dL


 


Creatinine     (0.7-1.3)  mg/dL


 


Est Cr Clr Drug Dosing     mL/min


 


Estimated GFR (MDRD)     (>60)  mL/min


 


BUN/Creatinine Ratio     (14-18)  


 


Glucose     ()  mg/dL


 


Lactic Acid     (0.4-2.0)  mmol/L


 


Calcium     (8.5-10.1)  mg/dL


 


Magnesium  1.9    (1.8-2.4)  mg/dl


 


CK-MB (CK-2)     (0-3.6)  ng/ml


 


Troponin I     (0.00-0.056)  ng/mL


 


C-Reactive Protein     (<1.0)  mg/dL


 


NT-Pro-B Natriuret Pep     (0-450)  pg/mL


 


Prostate Specific Ag    12.7 H  (0.1-4.0)  ng/mL


 


Urine Color   Yellow   (Yellow)  


 


Urine Appearance   Clear   (Clear)  


 


Urine pH   6.0   (5.0-8.0)  


 


Ur Specific Gravity   1.025   (1.005-1.030)  


 


Urine Protein   1+ H   (Negative)  


 


Urine Glucose (UA)   Negative   (Negative)  


 


Urine Ketones   Trace H   (Negative)  


 


Urine Occult Blood   Negative   (Negative)  


 


Urine Nitrite   Negative   (Negative)  


 


Urine Bilirubin   1+ H   (Negative)  


 


Urine Urobilinogen   0.2   (0.2-1.0)  


 


Ur Leukocyte Esterase   Negative   (Negative)  


 


Urine RBC   0-5   (0-5)  /hpf


 


Urine WBC   0-5   (0-5)  /hpf


 


Ur Epithelial Cells   0-5   (0-5)  /hpf


 


Urine Bacteria   Rare   (FEW)  /hpf


 


Urine Mucus   Not seen   (FEW)  /hpf














  03/05/19 03/05/19 03/05/19 Range/Units





  06:05 06:05 06:05 


 


WBC  8.70    (4.23-9.07)  K/mm3


 


RBC  3.22 L    (4.63-6.08)  M/mm3


 


Hgb  9.5 L    (13.7-17.5)  gm/L


 


Hct  29.1 L    (40.1-51.0)  %


 


MCV  90.4    (79.0-92.2)  fl


 


MCH  29.5    (25.7-32.2)  pg


 


MCHC  32.6    (32.2-35.5)  g/dl


 


RDW Std Deviation  44.0 H    (35.1-43.9)  fL


 


Plt Count  201    (163-337)  K/mm3


 


MPV  11.0    (9.4-12.3)  fl


 


Neut % (Auto)  72.0 H    (34.0-67.9)  %


 


Lymph % (Auto)  21.0 L    (21.8-53.1)  %


 


Mono % (Auto)  6.0    (5.3-12.2)  %


 


Eos % (Auto)  0.6 L    (0.8-7.0)  


 


Baso % (Auto)  0.3    (0.1-1.2)  %


 


Neut # (Auto)  6.26 H    (1.78-5.38)  K/mm3


 


Lymph # (Auto)  1.83    (1.32-3.57)  K/mm3


 


Mono # (Auto)  0.52    (0.30-0.82)  K/mm3


 


Eos # (Auto)  0.05    (0.04-0.54)  K/mm3


 


Baso # (Auto)  0.03    (0.01-0.08)  K/mm3


 


Sodium   143   (136-145)  mEq/L


 


Potassium   3.3 L   (3.5-5.1)  mEq/L


 


Chloride   107   ()  mEq/L


 


Carbon Dioxide   27   (21-32)  mEq/L


 


Anion Gap   12.3   (5-15)  


 


BUN   34 H   (7-18)  mg/dL


 


Creatinine   0.9   (0.7-1.3)  mg/dL


 


Est Cr Clr Drug Dosing   59.36   mL/min


 


Estimated GFR (MDRD)   > 60   (>60)  mL/min


 


BUN/Creatinine Ratio   37.8 H   (14-18)  


 


Glucose   91   ()  mg/dL


 


Lactic Acid    0.5  (0.4-2.0)  mmol/L


 


Calcium   7.9 L   (8.5-10.1)  mg/dL


 


Magnesium   2.1   (1.8-2.4)  mg/dl


 


CK-MB (CK-2)     (0-3.6)  ng/ml


 


Troponin I   0.557 H*   (0.00-0.056)  ng/mL


 


C-Reactive Protein   6.4 H*   (<1.0)  mg/dL


 


NT-Pro-B Natriuret Pep     (0-450)  pg/mL


 


Prostate Specific Ag     (0.1-4.0)  ng/mL


 


Urine Color     (Yellow)  


 


Urine Appearance     (Clear)  


 


Urine pH     (5.0-8.0)  


 


Ur Specific Gravity     (1.005-1.030)  


 


Urine Protein     (Negative)  


 


Urine Glucose (UA)     (Negative)  


 


Urine Ketones     (Negative)  


 


Urine Occult Blood     (Negative)  


 


Urine Nitrite     (Negative)  


 


Urine Bilirubin     (Negative)  


 


Urine Urobilinogen     (0.2-1.0)  


 


Ur Leukocyte Esterase     (Negative)  


 


Urine RBC     (0-5)  /hpf


 


Urine WBC     (0-5)  /hpf


 


Ur Epithelial Cells     (0-5)  /hpf


 


Urine Bacteria     (FEW)  /hpf


 


Urine Mucus     (FEW)  /hpf














  03/05/19 03/05/19 03/05/19 Range/Units





  10:27 18:00 18:00 


 


WBC     (4.23-9.07)  K/mm3


 


RBC     (4.63-6.08)  M/mm3


 


Hgb     (13.7-17.5)  gm/L


 


Hct     (40.1-51.0)  %


 


MCV     (79.0-92.2)  fl


 


MCH     (25.7-32.2)  pg


 


MCHC     (32.2-35.5)  g/dl


 


RDW Std Deviation     (35.1-43.9)  fL


 


Plt Count     (163-337)  K/mm3


 


MPV     (9.4-12.3)  fl


 


Neut % (Auto)     (34.0-67.9)  %


 


Lymph % (Auto)     (21.8-53.1)  %


 


Mono % (Auto)     (5.3-12.2)  %


 


Eos % (Auto)     (0.8-7.0)  


 


Baso % (Auto)     (0.1-1.2)  %


 


Neut # (Auto)     (1.78-5.38)  K/mm3


 


Lymph # (Auto)     (1.32-3.57)  K/mm3


 


Mono # (Auto)     (0.30-0.82)  K/mm3


 


Eos # (Auto)     (0.04-0.54)  K/mm3


 


Baso # (Auto)     (0.01-0.08)  K/mm3


 


Sodium     (136-145)  mEq/L


 


Potassium     (3.5-5.1)  mEq/L


 


Chloride     ()  mEq/L


 


Carbon Dioxide     (21-32)  mEq/L


 


Anion Gap     (5-15)  


 


BUN     (7-18)  mg/dL


 


Creatinine     (0.7-1.3)  mg/dL


 


Est Cr Clr Drug Dosing     mL/min


 


Estimated GFR (MDRD)     (>60)  mL/min


 


BUN/Creatinine Ratio     (14-18)  


 


Glucose     ()  mg/dL


 


Lactic Acid     (0.4-2.0)  mmol/L


 


Calcium     (8.5-10.1)  mg/dL


 


Magnesium     (1.8-2.4)  mg/dl


 


CK-MB (CK-2)  1.6    (0-3.6)  ng/ml


 


Troponin I  0.423 H*  0.369 H*   (0.00-0.056)  ng/mL


 


C-Reactive Protein     (<1.0)  mg/dL


 


NT-Pro-B Natriuret Pep    2473 H  (0-450)  pg/mL


 


Prostate Specific Ag     (0.1-4.0)  ng/mL


 


Urine Color     (Yellow)  


 


Urine Appearance     (Clear)  


 


Urine pH     (5.0-8.0)  


 


Ur Specific Gravity     (1.005-1.030)  


 


Urine Protein     (Negative)  


 


Urine Glucose (UA)     (Negative)  


 


Urine Ketones     (Negative)  


 


Urine Occult Blood     (Negative)  


 


Urine Nitrite     (Negative)  


 


Urine Bilirubin     (Negative)  


 


Urine Urobilinogen     (0.2-1.0)  


 


Ur Leukocyte Esterase     (Negative)  


 


Urine RBC     (0-5)  /hpf


 


Urine WBC     (0-5)  /hpf


 


Ur Epithelial Cells     (0-5)  /hpf


 


Urine Bacteria     (FEW)  /hpf


 


Urine Mucus     (FEW)  /hpf











Med Orders - Current: 


 Current Medications





Aspirin (Ecotrin)  325 mg PO DAILY [facility]


Enoxaparin Sodium (Lovenox)  40 mg SUBCUT Q24H [facility]


   Last Admin: 03/04/19 21:11 Dose:  40 mg


Furosemide (Lasix)  20 mg IVPUSH ONETIME ONE


   Stop: 03/06/19 09:01


Hydralazine HCl (Apresoline)  10 mg IVPUSH Q8H PRN


   PRN Reason: Hypertension


Potassium Chloride (Klor-Con M20)  40 meq PO BID [facility]


   Stop: 03/06/19 09:01


   Last Admin: 03/05/19 09:24 Dose:  40 meq


Sodium Chloride (Saline Flush)  10 ml FLUSH ASDIRECTED PRN


   PRN Reason: Keep Vein Open


   Last Admin: 03/04/19 16:28 Dose:  10 ml





Discontinued Medications





Aspirin (Aspirin)  324 mg PO ONETIME ONE


   Stop: 03/05/19 08:01


   Last Admin: 03/05/19 09:24 Dose:  324 mg


Bisacodyl (Dulcolax)  10 mg RECTAL ONETIME ONE


   Stop: 03/05/19 06:31


   Last Admin: 03/05/19 09:23 Dose:  Not Given


Gadobenate Dimeglumine (Multihance)  12 ml IVPUSH ONETIME ONE


   Stop: 03/05/19 08:02


   Last Admin: 03/05/19 08:30 Dose:  12 ml


Potassium Chloride/Sodium Chloride (Normal Saline With 20 Meq Kcl)  1,000 mls @ 

150 mls/hr IV ASDIRECTED [facility]


   Last Admin: 03/04/19 18:03 Dose:  150 mls/hr


Sodium Chloride (Normal Saline)  1,000 mls @ 999 mls/hr IV ONETIME ONE


   Stop: 03/04/19 21:20


   Last Admin: 03/04/19 21:25 Dose:  Not Given


Magnesium Sulfate 2 gm/ Premix  50 mls @ 25 mls/hr IV ONETIME ONE


   Stop: 03/04/19 22:23


   Last Admin: 03/04/19 21:12 Dose:  25 mls/hr


Lactated Ringer's (Ringers, Lactated)  1,000 mls @ 999 mls/hr IV ONETIME ONE


   Stop: 03/04/19 21:37


   Last Admin: 03/04/19 21:12 Dose:  999 mls/hr


Lactated Ringer's (Ringers, Lactated)  1,000 mls @ 125 mls/hr IV ASDIRECTED [facility]


   Stop: 03/05/19 06:00


   Last Admin: 03/05/19 07:12 Dose:  125 mls/hr


Lactated Ringer's (Ringers, Lactated)  1,000 mls @ 999 mls/hr IV .BOLUS ONE


   Stop: 03/05/19 05:34


   Last Admin: 03/05/19 04:35 Dose:  999 mls/hr


Sodium Chloride (Saline Flush)  10 ml FLUSH ONETIME PRN


   PRN Reason: Keep Vein Open


   Stop: 03/05/19 12:00


   Last Admin: 03/05/19 08:31 Dose:  10 ml











- Exam


Quality Assessment: DVT Prophylaxis


General: Alert, Oriented, Cooperative, No Acute Distress


HEENT: Pupils Equal, Pupils Reactive, EOMI


Neck: Trachea Midline, No JVD


Lungs: Normal Respiratory Effort


Cardiovascular: Regular Rate, Regular Rhythm


GI/Abdominal Exam: Normal Bowel Sounds, Soft, Non-Tender, No Organomegaly, No 

Distention


 (Male) Exam: Deferred


Back Exam: Normal Inspection


Extremities: Normal Inspection, Non-Tender, Normal Capillary Refill


Skin: Warm


Neurological: No New Focal Deficit, Normal Speech, Cranial Nerves Intact


Psy/Mental Status: Alert, Depressed





- Problem List & Annotations


(1) Tobacco dependence


SNOMED Code(s): 41409128


   Code(s): F17.200 - NICOTINE DEPENDENCE, UNSPECIFIED, UNCOMPLICATED   Status: 

Acute   Current Visit: Yes   





(2) Prostate cancer


SNOMED Code(s): 445325447


   Code(s): C61 - MALIGNANT NEOPLASM OF PROSTATE   Status: Acute   Current Visit

: Yes   





(3) Cerebrovascular accident (CVA)


SNOMED Code(s): 770320946


   Code(s): I63.9 - CEREBRAL INFARCTION, UNSPECIFIED   Status: Acute   Current 

Visit: Yes   


Qualifiers: 


   CVA mechanism: unspecified   Qualified Code(s): I63.9 - Cerebral infarction, 

unspecified   





(4) Hypokalemia


SNOMED Code(s): 22654853


   Code(s): E87.6 - HYPOKALEMIA   Status: Acute   Current Visit: Yes   





(5) Laceration of right upper arm


SNOMED Code(s): 183137928


   Code(s): S41.111A - LACERATION W/O FOREIGN BODY OF RIGHT UPPER ARM, INIT 

ENCNTR   Status: Acute   Current Visit: Yes   


Qualifiers: 


   Encounter type: initial encounter   Qualified Code(s): S41.111A - Laceration 

without foreign body of right upper arm, initial encounter   





(6) Suicidal ideation


SNOMED Code(s): 7661517


   Code(s): R45.851 - SUICIDAL IDEATIONS   Status: Acute   Current Visit: Yes   





- Problem List Review


Problem List Initiated/Reviewed/Updated: Yes





- My Orders


Last 24 Hours: 


My Active Orders





03/04/19 19:39


EKG 12 Lead [EK] Routine 





03/04/19 20:14


Head of Bed Elevation [RC] ASDIRECTED 


Neuro Check [RC] QSHIFT 





03/04/19 20:22


Resuscitation Status Routine 





03/04/19 20:23


Notify Provider Consults [RC] 08 





03/04/19 21:00


Enoxaparin [Lovenox]   40 mg SUBCUT Q24H 


Potassium Chloride [Klor-Con M20]   40 meq PO BID 





03/04/19 23:01


Consult to Speech Language Pathology [SLP Evaluation and Treatment] [CONS] 

Routine 





03/05/19 05:43


EKG 12 Lead [EK] Routine 





03/05/19 09:00


Consult to Physician [CONS] Routine 





03/05/19 11:00


Consult to Occupational Therapy [OT Evaluation and Treatment] [CONS] Routine 


Consult to Physical Therapy [PT Evaluation and Treatment] [CONS] Routine 





03/05/19 18:28


Up With Assistance [RC] ASDIRECTED 





03/05/19 19:22


hydrALAZINE [Apresoline]   10 mg IVPUSH Q8H PRN 





03/05/19 Lunch


Heart Healthy Diet [DIET] 





03/06/19 05:00


BASIC METABOLIC PANEL,BMP [CHEM] DAILY 


CBC WITH AUTO DIFF [HEME] DAILY 


CRP [C-REACTIVE PROTEIN] [CHEM] DAILY 


LACTIC ACID [CHEM] DAILY 


LIPID PANEL [CHEM] Routine 


MAGNESIUM [CHEM] DAILY 





03/06/19 09:00


Aspirin [Ecotrin]   325 mg PO DAILY 


Furosemide [Lasix]   20 mg IVPUSH ONETIME ONE 





03/07/19 05:00


BASIC METABOLIC PANEL,BMP [CHEM] DAILY 


CBC WITH AUTO DIFF [HEME] DAILY 


CRP [C-REACTIVE PROTEIN] [CHEM] DAILY 


LACTIC ACID [CHEM] DAILY 


MAGNESIUM [CHEM] DAILY 





03/08/19 05:00


BASIC METABOLIC PANEL,BMP [CHEM] DAILY 


CBC WITH AUTO DIFF [HEME] DAILY 


CRP [C-REACTIVE PROTEIN] [CHEM] DAILY 


LACTIC ACID [CHEM] DAILY 


MAGNESIUM [CHEM] DAILY 





03/09/19 05:00


BASIC METABOLIC PANEL,BMP [CHEM] DAILY 


CBC WITH AUTO DIFF [HEME] DAILY 


CRP [C-REACTIVE PROTEIN] [CHEM] DAILY 


LACTIC ACID [CHEM] DAILY 


MAGNESIUM [CHEM] DAILY 














- Plan


Plan:: 











Impression:





Suicidal ideation, history of end stage prostate cancer per patient report


Scheduled for PET scan this week;  CTX, Degarelix


S/P bilateral extremity lacerations





CVA, subacute with predominately right sided deficits;  liberal BP mgt.





Dehydration





Hypokalemia





Elevated WBCs, query reactive cf infectious





Elevated Tn (demand ischemia cf ACS)





Imaging, 3-5-19 (carotid, 2D echo, MRI), acute CVA noted











Chronic


HTN


HLD








Plan:





IVF


CVA protocol


MRI re; mets and CVA.


Bedside swallow evaluation


Ischemic work up;  ECG generalized ST depression, Sinus tach


Lipid panel


EMR from oncologist


Psychiatric consult;  re: depression with suicidal thoughts/gesture


Nicotine replacement


Full code status


DVT prophylaxis

## 2019-03-05 NOTE — US
Carotid ultrasound: Multiple real-time images were obtained.

 

Plaque: Moderate amount of calcified plaque scattered within the 

distal common carotid arteries and carotid bulb as well as origins of 

the external and internal carotid arteries.

 

Comparison: No previous carotid imaging.

 

Findings: Velocity measurements:

 

Right side:

CCA has a peak systolic velocity of 1.07 m/s.

ICA has a peak systolic velocity of 1.23 m/s and peak end-diastolic 

velocity of 0.20 m/s.

ECA has a peak systolic velocity of 1.39 m/s.

Vertebral artery has a peak systolic velocity of 1.07 m/s.

ICA/CCA ratio is 1.15.

 

Left side:

CCA has a peak systolic velocity of 0.88 m/s.

ICA has a peak systolic velocity of 0.76 m/s and peak end-diastolic 

velocity of 0.18 m/s.

ECA has a peak systolic velocity of 1.05 m/s.

Vertebral artery has a peak systolic velocity of 0.53 m/s.

ICA/CCA ratio is 0.9.

 

Impression:

1.  Moderate amount of calcified plaque as noted above.

2.  Velocity measurements within both internal carotid arteries 

correspond to stenosis in the range of 1-49%.

 

Diagnostic code #3

## 2019-03-05 NOTE — MR
MRI brain (with and without contrast)

 

Technique: T1 and T2 FLAIR sagittal; diffusion, T2, FLAIR, and T1 

axial; post-gadolinium T1 axial and post-gadolinium T1 fat-suppressed 

coronal; post gadolinium FLAIR coronal images were also obtained 

through the brain.  Gradient echo axial and coronal images were also 

obtained.

 

Comparison: Prior head CT study of 03/04/19.

 

Findings: Ventricles along with basal cisterns and sulci over the 

convexities are moderately prominent.  Normal signal void is seen 

within the major cerebral arteries within the skull base.  

 

Areas of increased signal are seen within the periventricular and 

subcortical white matter compatible with small vessel ischemic 

demyelination change.  

 

Old white matter infarcts are seen within the posterior right basal 

ganglia and within the periventricular white matter within the right 

parietal region.  

 

Small area of diffusion abnormality is noted within the left 

periventricular white matter within the parietal region.  This 

diffusion abnormality measures approximately 1.6 cm in size and 

correlates to an area of increased signal on the long TR sequence 

images compatible with small and fairly acute white matter infarct.  

This appears nonreversible.  

 

No other diffusion abnormalities are seen.  No midline shift or mass 

effect is seen.  No abnormal areas of enhancement are seen.  Paranasal

 sinuses are clear.

 

Impression:

1.  Small fairly acute white matter infarct is noted within the 

posterior left parietal region.

2.  Senescent change as noted above.

3.  No abnormal enhancement is seen.

 

Diagnostic code #3

## 2019-03-05 NOTE — CT
Head CT



Technique: Multiple axial sections to the brain were obtained.  Intravenous 
contrast was not utilized.



Comparison: No previous study.



Findings: Ventricles along with basal cisterns and sulci over the convexities 
are moderately prominent.  Several small old white matter infarcts are seen on 
both sides.  Diminished density is noted within the periventricular and 
subcortical white matter compatible with small vessel ischemic demyelination 
change.  Several old lacunar infarcts are noted within the basal ganglia.  No 
other abnormal parenchymal densities are seen.  No evidence of intracranial 
hemorrhage.  No midline shift or mass effect is seen.



Bone window settings were reviewed which show no acute calvarial abnormality.  
Visualized sinuses are clear.



Impression:

1.  Senescent change as noted above.  No acute intracranial abnormality is 
identified on noncontrast head CT exam.



Diagnostic code #2
MTDD

## 2019-03-05 NOTE — PCM.PN
- General Info


Date of Service: 03/05/19


Admission Dx/Problem (Free Text): 


 Admission Diagnosis/Problem





Admission Diagnosis/Problem      CVA, Cerebrovascular accident











- Patient Data


Vitals - Most Recent: 


 Last Vital Signs











Temp  98.8 F   03/04/19 23:17


 


Pulse  117 H  03/04/19 23:17


 


Resp  12   03/04/19 23:17


 


BP  142/66 H  03/04/19 23:17


 


Pulse Ox  96   03/04/19 23:17











Weight - Most Recent: 134 lb 9.6 oz


I&O - Last 24 Hours: 


 Intake & Output











 03/04/19 03/05/19 03/05/19





 22:59 06:59 14:59


 


Intake Total 265 2732 


 


Output Total  200 


 


Balance 265 2532 











Lab Results Last 24 Hours: 


 Laboratory Results - last 24 hr











  03/04/19 03/04/19 03/04/19 Range/Units





  16:15 16:35 16:35 


 


WBC   12.79 H   (4.23-9.07)  K/mm3


 


RBC   3.98 L   (4.63-6.08)  M/mm3


 


Hgb   11.9 L   (13.7-17.5)  gm/L


 


Hct   35.4 L   (40.1-51.0)  %


 


MCV   88.9   (79.0-92.2)  fl


 


MCH   29.9   (25.7-32.2)  pg


 


MCHC   33.6   (32.2-35.5)  g/dl


 


RDW Std Deviation   44.6 H   (35.1-43.9)  fL


 


Plt Count   278   (163-337)  K/mm3


 


MPV   10.1   (9.4-12.3)  fl


 


Neut % (Auto)   77.7 H   (34.0-67.9)  %


 


Lymph % (Auto)   14.5 L   (21.8-53.1)  %


 


Mono % (Auto)   7.2   (5.3-12.2)  %


 


Eos % (Auto)   0.2 L   (0.8-7.0)  


 


Baso % (Auto)   0.2   (0.1-1.2)  %


 


Neut # (Auto)   9.95 H   (1.78-5.38)  K/mm3


 


Lymph # (Auto)   1.86   (1.32-3.57)  K/mm3


 


Mono # (Auto)   0.92 H   (0.30-0.82)  K/mm3


 


Eos # (Auto)   0.02 L   (0.04-0.54)  K/mm3


 


Baso # (Auto)   0.02   (0.01-0.08)  K/mm3


 


PT    10.9  (9.5-12.1)  SECONDS


 


INR    1.00  


 


APTT    25  (24-31)  SECONDS


 


Sodium     (136-145)  mEq/L


 


Potassium     (3.5-5.1)  mEq/L


 


Chloride     ()  mEq/L


 


Carbon Dioxide     (21-32)  mEq/L


 


Anion Gap     (5-15)  


 


BUN     (7-18)  mg/dL


 


Creatinine     (0.7-1.3)  mg/dL


 


Est Cr Clr Drug Dosing     mL/min


 


Estimated GFR (MDRD)     (>60)  mL/min


 


BUN/Creatinine Ratio     (14-18)  


 


Glucose     ()  mg/dL


 


POC Glucose  124 H    ()  mg/dL


 


Lactic Acid     (0.4-2.0)  mmol/L


 


Calcium     (8.5-10.1)  mg/dL


 


Magnesium     (1.8-2.4)  mg/dl


 


Total Bilirubin     (0.2-1.0)  mg/dL


 


AST     (15-37)  U/L


 


ALT     (16-63)  U/L


 


Alkaline Phosphatase     ()  U/L


 


Troponin I     (0.00-0.056)  ng/mL


 


Total Protein     (6.4-8.2)  g/dl


 


Albumin     (3.4-5.0)  g/dl


 


Globulin     gm/dL


 


Albumin/Globulin Ratio     (1-2)  


 


TSH 3rd Generation     (0.358-3.74)  uIU/mL


 


Urine Color     (Yellow)  


 


Urine Appearance     (Clear)  


 


Urine pH     (5.0-8.0)  


 


Ur Specific Gravity     (1.005-1.030)  


 


Urine Protein     (Negative)  


 


Urine Glucose (UA)     (Negative)  


 


Urine Ketones     (Negative)  


 


Urine Occult Blood     (Negative)  


 


Urine Nitrite     (Negative)  


 


Urine Bilirubin     (Negative)  


 


Urine Urobilinogen     (0.2-1.0)  


 


Ur Leukocyte Esterase     (Negative)  


 


Urine RBC     (0-5)  /hpf


 


Urine WBC     (0-5)  /hpf


 


Ur Epithelial Cells     (0-5)  /hpf


 


Urine Bacteria     (FEW)  /hpf


 


Urine Mucus     (FEW)  /hpf


 


Salicylates     (2.8-20)  mg/dL


 


Urine Opiates Screen     (TNULRQ=728)  


 


Ur Buprenorphine Scrn     (CUTOFF=10)  


 


Ur Oxycodone Screen     (RJV3YT=197)  


 


Urine Methadone Screen     (XKK5AB=770)  


 


Ur Propoxyphene Screen     (SZMCEF=813)  


 


Acetaminophen     (10-30)  ug/mL


 


Ur Barbiturates Screen     (KUZFUU=579)  


 


Ur Tricyclics Screen     (THXKYD=486)  


 


Ur Phencyclidine Scrn     (CUTOFF=25)  


 


Ur Amphetamine Screen     (BGTHEO=278)  


 


U Methamphetamines Scrn     (WGISGY=275)  


 


U Benzodiazepines Scrn     (KUNYPA=916)  


 


U Cocaine Metab Screen     (MZONVM=217)  


 


U Marijuana (THC) Screen     (CUTOFF=50)  


 


Ethyl Alcohol     (0.00)  gm%














  03/04/19 03/04/19 03/04/19 Range/Units





  16:35 16:35 16:35 


 


WBC     (4.23-9.07)  K/mm3


 


RBC     (4.63-6.08)  M/mm3


 


Hgb     (13.7-17.5)  gm/L


 


Hct     (40.1-51.0)  %


 


MCV     (79.0-92.2)  fl


 


MCH     (25.7-32.2)  pg


 


MCHC     (32.2-35.5)  g/dl


 


RDW Std Deviation     (35.1-43.9)  fL


 


Plt Count     (163-337)  K/mm3


 


MPV     (9.4-12.3)  fl


 


Neut % (Auto)     (34.0-67.9)  %


 


Lymph % (Auto)     (21.8-53.1)  %


 


Mono % (Auto)     (5.3-12.2)  %


 


Eos % (Auto)     (0.8-7.0)  


 


Baso % (Auto)     (0.1-1.2)  %


 


Neut # (Auto)     (1.78-5.38)  K/mm3


 


Lymph # (Auto)     (1.32-3.57)  K/mm3


 


Mono # (Auto)     (0.30-0.82)  K/mm3


 


Eos # (Auto)     (0.04-0.54)  K/mm3


 


Baso # (Auto)     (0.01-0.08)  K/mm3


 


PT     (9.5-12.1)  SECONDS


 


INR     


 


APTT     (24-31)  SECONDS


 


Sodium  140    (136-145)  mEq/L


 


Potassium  2.5 L    (3.5-5.1)  mEq/L


 


Chloride  99    ()  mEq/L


 


Carbon Dioxide  32    (21-32)  mEq/L


 


Anion Gap  11.5    (5-15)  


 


BUN  45 H    (7-18)  mg/dL


 


Creatinine  1.3    (0.7-1.3)  mg/dL


 


Est Cr Clr Drug Dosing  39.69    mL/min


 


Estimated GFR (MDRD)  54    (>60)  mL/min


 


BUN/Creatinine Ratio  34.6 H    (14-18)  


 


Glucose  116 H    ()  mg/dL


 


POC Glucose     ()  mg/dL


 


Lactic Acid     (0.4-2.0)  mmol/L


 


Calcium  8.8    (8.5-10.1)  mg/dL


 


Magnesium    1.9  (1.8-2.4)  mg/dl


 


Total Bilirubin  1.2 H    (0.2-1.0)  mg/dL


 


AST  21    (15-37)  U/L


 


ALT  18    (16-63)  U/L


 


Alkaline Phosphatase  78    ()  U/L


 


Troponin I  < 0.017    (0.00-0.056)  ng/mL


 


Total Protein  6.4    (6.4-8.2)  g/dl


 


Albumin  3.1 L    (3.4-5.0)  g/dl


 


Globulin  3.3    gm/dL


 


Albumin/Globulin Ratio  0.9 L    (1-2)  


 


TSH 3rd Generation  2.290    (0.358-3.74)  uIU/mL


 


Urine Color     (Yellow)  


 


Urine Appearance     (Clear)  


 


Urine pH     (5.0-8.0)  


 


Ur Specific Gravity     (1.005-1.030)  


 


Urine Protein     (Negative)  


 


Urine Glucose (UA)     (Negative)  


 


Urine Ketones     (Negative)  


 


Urine Occult Blood     (Negative)  


 


Urine Nitrite     (Negative)  


 


Urine Bilirubin     (Negative)  


 


Urine Urobilinogen     (0.2-1.0)  


 


Ur Leukocyte Esterase     (Negative)  


 


Urine RBC     (0-5)  /hpf


 


Urine WBC     (0-5)  /hpf


 


Ur Epithelial Cells     (0-5)  /hpf


 


Urine Bacteria     (FEW)  /hpf


 


Urine Mucus     (FEW)  /hpf


 


Salicylates   0.5 L   (2.8-20)  mg/dL


 


Urine Opiates Screen     (BWIDEJ=347)  


 


Ur Buprenorphine Scrn     (CUTOFF=10)  


 


Ur Oxycodone Screen     (HIB2DN=727)  


 


Urine Methadone Screen     (UQN6VF=349)  


 


Ur Propoxyphene Screen     (NIDSEA=694)  


 


Acetaminophen  0 L    (10-30)  ug/mL


 


Ur Barbiturates Screen     (DYVIEA=366)  


 


Ur Tricyclics Screen     (UAKSUT=025)  


 


Ur Phencyclidine Scrn     (CUTOFF=25)  


 


Ur Amphetamine Screen     (FGNFHN=708)  


 


U Methamphetamines Scrn     (RYGAOZ=809)  


 


U Benzodiazepines Scrn     (RRYIIP=607)  


 


U Cocaine Metab Screen     (GNCEXM=639)  


 


U Marijuana (THC) Screen     (CUTOFF=50)  


 


Ethyl Alcohol  0.00    (0.00)  gm%














  03/04/19 03/04/19 03/05/19 Range/Units





  17:47 17:47 06:05 


 


WBC    8.70  (4.23-9.07)  K/mm3


 


RBC    3.22 L  (4.63-6.08)  M/mm3


 


Hgb    9.5 L  (13.7-17.5)  gm/L


 


Hct    29.1 L  (40.1-51.0)  %


 


MCV    90.4  (79.0-92.2)  fl


 


MCH    29.5  (25.7-32.2)  pg


 


MCHC    32.6  (32.2-35.5)  g/dl


 


RDW Std Deviation    44.0 H  (35.1-43.9)  fL


 


Plt Count    201  (163-337)  K/mm3


 


MPV    11.0  (9.4-12.3)  fl


 


Neut % (Auto)    72.0 H  (34.0-67.9)  %


 


Lymph % (Auto)    21.0 L  (21.8-53.1)  %


 


Mono % (Auto)    6.0  (5.3-12.2)  %


 


Eos % (Auto)    0.6 L  (0.8-7.0)  


 


Baso % (Auto)    0.3  (0.1-1.2)  %


 


Neut # (Auto)    6.26 H  (1.78-5.38)  K/mm3


 


Lymph # (Auto)    1.83  (1.32-3.57)  K/mm3


 


Mono # (Auto)    0.52  (0.30-0.82)  K/mm3


 


Eos # (Auto)    0.05  (0.04-0.54)  K/mm3


 


Baso # (Auto)    0.03  (0.01-0.08)  K/mm3


 


PT     (9.5-12.1)  SECONDS


 


INR     


 


APTT     (24-31)  SECONDS


 


Sodium     (136-145)  mEq/L


 


Potassium     (3.5-5.1)  mEq/L


 


Chloride     ()  mEq/L


 


Carbon Dioxide     (21-32)  mEq/L


 


Anion Gap     (5-15)  


 


BUN     (7-18)  mg/dL


 


Creatinine     (0.7-1.3)  mg/dL


 


Est Cr Clr Drug Dosing     mL/min


 


Estimated GFR (MDRD)     (>60)  mL/min


 


BUN/Creatinine Ratio     (14-18)  


 


Glucose     ()  mg/dL


 


POC Glucose     ()  mg/dL


 


Lactic Acid     (0.4-2.0)  mmol/L


 


Calcium     (8.5-10.1)  mg/dL


 


Magnesium     (1.8-2.4)  mg/dl


 


Total Bilirubin     (0.2-1.0)  mg/dL


 


AST     (15-37)  U/L


 


ALT     (16-63)  U/L


 


Alkaline Phosphatase     ()  U/L


 


Troponin I     (0.00-0.056)  ng/mL


 


Total Protein     (6.4-8.2)  g/dl


 


Albumin     (3.4-5.0)  g/dl


 


Globulin     gm/dL


 


Albumin/Globulin Ratio     (1-2)  


 


TSH 3rd Generation     (0.358-3.74)  uIU/mL


 


Urine Color   Yellow   (Yellow)  


 


Urine Appearance   Clear   (Clear)  


 


Urine pH   6.0   (5.0-8.0)  


 


Ur Specific Gravity   1.025   (1.005-1.030)  


 


Urine Protein   1+ H   (Negative)  


 


Urine Glucose (UA)   Negative   (Negative)  


 


Urine Ketones   Trace H   (Negative)  


 


Urine Occult Blood   Negative   (Negative)  


 


Urine Nitrite   Negative   (Negative)  


 


Urine Bilirubin   1+ H   (Negative)  


 


Urine Urobilinogen   0.2   (0.2-1.0)  


 


Ur Leukocyte Esterase   Negative   (Negative)  


 


Urine RBC   0-5   (0-5)  /hpf


 


Urine WBC   0-5   (0-5)  /hpf


 


Ur Epithelial Cells   0-5   (0-5)  /hpf


 


Urine Bacteria   Rare   (FEW)  /hpf


 


Urine Mucus   Not seen   (FEW)  /hpf


 


Salicylates     (2.8-20)  mg/dL


 


Urine Opiates Screen  Negative    (AOQEIW=352)  


 


Ur Buprenorphine Scrn  Negative    (CUTOFF=10)  


 


Ur Oxycodone Screen  Negative    (SYV2FN=243)  


 


Urine Methadone Screen  Negative    (CLI2MP=082)  


 


Ur Propoxyphene Screen  Negative    (DDDSUX=056)  


 


Acetaminophen     (10-30)  ug/mL


 


Ur Barbiturates Screen  Negative    (HOJYKC=114)  


 


Ur Tricyclics Screen  Negative    (CLOMBH=282)  


 


Ur Phencyclidine Scrn  Negative    (CUTOFF=25)  


 


Ur Amphetamine Screen  Negative    (TVTWEN=191)  


 


U Methamphetamines Scrn  Negative    (EVQVKR=361)  


 


U Benzodiazepines Scrn  Negative    (MGNJWM=791)  


 


U Cocaine Metab Screen  Negative    (QFYBGY=092)  


 


U Marijuana (THC) Screen  Negative    (CUTOFF=50)  


 


Ethyl Alcohol     (0.00)  gm%














  03/05/19 Range/Units





  06:05 


 


WBC   (4.23-9.07)  K/mm3


 


RBC   (4.63-6.08)  M/mm3


 


Hgb   (13.7-17.5)  gm/L


 


Hct   (40.1-51.0)  %


 


MCV   (79.0-92.2)  fl


 


MCH   (25.7-32.2)  pg


 


MCHC   (32.2-35.5)  g/dl


 


RDW Std Deviation   (35.1-43.9)  fL


 


Plt Count   (163-337)  K/mm3


 


MPV   (9.4-12.3)  fl


 


Neut % (Auto)   (34.0-67.9)  %


 


Lymph % (Auto)   (21.8-53.1)  %


 


Mono % (Auto)   (5.3-12.2)  %


 


Eos % (Auto)   (0.8-7.0)  


 


Baso % (Auto)   (0.1-1.2)  %


 


Neut # (Auto)   (1.78-5.38)  K/mm3


 


Lymph # (Auto)   (1.32-3.57)  K/mm3


 


Mono # (Auto)   (0.30-0.82)  K/mm3


 


Eos # (Auto)   (0.04-0.54)  K/mm3


 


Baso # (Auto)   (0.01-0.08)  K/mm3


 


PT   (9.5-12.1)  SECONDS


 


INR   


 


APTT   (24-31)  SECONDS


 


Sodium   (136-145)  mEq/L


 


Potassium   (3.5-5.1)  mEq/L


 


Chloride   ()  mEq/L


 


Carbon Dioxide   (21-32)  mEq/L


 


Anion Gap   (5-15)  


 


BUN   (7-18)  mg/dL


 


Creatinine   (0.7-1.3)  mg/dL


 


Est Cr Clr Drug Dosing   mL/min


 


Estimated GFR (MDRD)   (>60)  mL/min


 


BUN/Creatinine Ratio   (14-18)  


 


Glucose   ()  mg/dL


 


POC Glucose   ()  mg/dL


 


Lactic Acid  0.5  (0.4-2.0)  mmol/L


 


Calcium   (8.5-10.1)  mg/dL


 


Magnesium   (1.8-2.4)  mg/dl


 


Total Bilirubin   (0.2-1.0)  mg/dL


 


AST   (15-37)  U/L


 


ALT   (16-63)  U/L


 


Alkaline Phosphatase   ()  U/L


 


Troponin I   (0.00-0.056)  ng/mL


 


Total Protein   (6.4-8.2)  g/dl


 


Albumin   (3.4-5.0)  g/dl


 


Globulin   gm/dL


 


Albumin/Globulin Ratio   (1-2)  


 


TSH 3rd Generation   (0.358-3.74)  uIU/mL


 


Urine Color   (Yellow)  


 


Urine Appearance   (Clear)  


 


Urine pH   (5.0-8.0)  


 


Ur Specific Gravity   (1.005-1.030)  


 


Urine Protein   (Negative)  


 


Urine Glucose (UA)   (Negative)  


 


Urine Ketones   (Negative)  


 


Urine Occult Blood   (Negative)  


 


Urine Nitrite   (Negative)  


 


Urine Bilirubin   (Negative)  


 


Urine Urobilinogen   (0.2-1.0)  


 


Ur Leukocyte Esterase   (Negative)  


 


Urine RBC   (0-5)  /hpf


 


Urine WBC   (0-5)  /hpf


 


Ur Epithelial Cells   (0-5)  /hpf


 


Urine Bacteria   (FEW)  /hpf


 


Urine Mucus   (FEW)  /hpf


 


Salicylates   (2.8-20)  mg/dL


 


Urine Opiates Screen   (LYBJWA=194)  


 


Ur Buprenorphine Scrn   (CUTOFF=10)  


 


Ur Oxycodone Screen   (KSK3HZ=169)  


 


Urine Methadone Screen   (FWG8XP=385)  


 


Ur Propoxyphene Screen   (LYSABC=829)  


 


Acetaminophen   (10-30)  ug/mL


 


Ur Barbiturates Screen   (UNHMXM=080)  


 


Ur Tricyclics Screen   (YEOXQI=659)  


 


Ur Phencyclidine Scrn   (CUTOFF=25)  


 


Ur Amphetamine Screen   (UOERPE=593)  


 


U Methamphetamines Scrn   (NSFZPJ=162)  


 


U Benzodiazepines Scrn   (WUWIKM=226)  


 


U Cocaine Metab Screen   (ZYMOAY=854)  


 


U Marijuana (THC) Screen   (CUTOFF=50)  


 


Ethyl Alcohol   (0.00)  gm%











Med Orders - Current: 


 Current Medications





Enoxaparin Sodium (Lovenox)  40 mg SUBCUT Q24H [facility]


   Last Admin: 03/04/19 21:11 Dose:  40 mg


Potassium Chloride (Klor-Con M20)  40 meq PO BID [facility]


   Last Admin: 03/04/19 21:11 Dose:  40 meq


Sodium Chloride (Saline Flush)  10 ml FLUSH ASDIRECTED PRN


   PRN Reason: Keep Vein Open


   Last Admin: 03/04/19 16:28 Dose:  10 ml





Discontinued Medications





Bisacodyl (Dulcolax)  10 mg RECTAL ONETIME ONE


   Stop: 03/05/19 06:31


Potassium Chloride/Sodium Chloride (Normal Saline With 20 Meq Kcl)  1,000 mls @ 

150 mls/hr IV ASDIRECTED [facility]


   Last Admin: 03/04/19 18:03 Dose:  150 mls/hr


Sodium Chloride (Normal Saline)  1,000 mls @ 999 mls/hr IV ONETIME ONE


   Stop: 03/04/19 21:20


   Last Admin: 03/04/19 21:25 Dose:  Not Given


Magnesium Sulfate 2 gm/ Premix  50 mls @ 25 mls/hr IV ONETIME ONE


   Stop: 03/04/19 22:23


   Last Admin: 03/04/19 21:12 Dose:  25 mls/hr


Lactated Ringer's (Ringers, Lactated)  1,000 mls @ 999 mls/hr IV ONETIME ONE


   Stop: 03/04/19 21:37


   Last Admin: 03/04/19 21:12 Dose:  999 mls/hr


Lactated Ringer's (Ringers, Lactated)  1,000 mls @ 125 mls/hr IV ASDIRECTED [facility]


   Stop: 03/05/19 06:00


   Last Admin: 03/05/19 07:12 Dose:  125 mls/hr


Lactated Ringer's (Ringers, Lactated)  1,000 mls @ 999 mls/hr IV .BOLUS ONE


   Stop: 03/05/19 05:34


   Last Admin: 03/05/19 04:35 Dose:  999 mls/hr

## 2019-03-05 NOTE — PCM.PN
- General Info


Date of Service: 03/05/19


Subjective Update: 








Patient has an elevated Tn, will perform serial studies;  this is in the 

setting of a CVA;  Continue ASA, plavix to be started. Clarify aggressiveness 

of care desired by the patient.


Functional Status: Reports: Pain Controlled, Tolerating Diet, Ambulating, 

Urinating





- Review of Systems


General: Reports: Weakness


HEENT: Reports: No Symptoms


Pulmonary: Reports: No Symptoms


Cardiovascular: Reports: No Symptoms


Gastrointestinal: Reports: No Symptoms


Genitourinary: Reports: No Symptoms


Musculoskeletal: Reports: No Symptoms


Skin: Reports: No Symptoms


Neurological: Reports: Pre-Existing Deficit, Weakness


Psychiatric: Reports: Depression





- Patient Data


Vitals - Most Recent: 


 Last Vital Signs











Temp  36.9 C   03/05/19 16:02


 


Pulse  66   03/05/19 16:02


 


Resp  14   03/05/19 16:02


 


BP  164/74 H  03/05/19 16:04


 


Pulse Ox  97   03/05/19 16:02











Weight - Most Recent: 58.967 kg


I&O - Last 24 Hours: 


 Intake & Output











 03/05/19 03/05/19 03/05/19





 06:59 14:59 22:59


 


Intake Total 2732 120 400


 


Output Total 200  350


 


Balance 2532 120 50











Lab Results Last 24 Hours: 


 Laboratory Results - last 24 hr











  03/04/19 03/04/19 03/04/19 Range/Units





  16:35 17:47 17:47 


 


WBC     (4.23-9.07)  K/mm3


 


RBC     (4.63-6.08)  M/mm3


 


Hgb     (13.7-17.5)  gm/L


 


Hct     (40.1-51.0)  %


 


MCV     (79.0-92.2)  fl


 


MCH     (25.7-32.2)  pg


 


MCHC     (32.2-35.5)  g/dl


 


RDW Std Deviation     (35.1-43.9)  fL


 


Plt Count     (163-337)  K/mm3


 


MPV     (9.4-12.3)  fl


 


Neut % (Auto)     (34.0-67.9)  %


 


Lymph % (Auto)     (21.8-53.1)  %


 


Mono % (Auto)     (5.3-12.2)  %


 


Eos % (Auto)     (0.8-7.0)  


 


Baso % (Auto)     (0.1-1.2)  %


 


Neut # (Auto)     (1.78-5.38)  K/mm3


 


Lymph # (Auto)     (1.32-3.57)  K/mm3


 


Mono # (Auto)     (0.30-0.82)  K/mm3


 


Eos # (Auto)     (0.04-0.54)  K/mm3


 


Baso # (Auto)     (0.01-0.08)  K/mm3


 


Sodium     (136-145)  mEq/L


 


Potassium     (3.5-5.1)  mEq/L


 


Chloride     ()  mEq/L


 


Carbon Dioxide     (21-32)  mEq/L


 


Anion Gap     (5-15)  


 


BUN     (7-18)  mg/dL


 


Creatinine     (0.7-1.3)  mg/dL


 


Est Cr Clr Drug Dosing     mL/min


 


Estimated GFR (MDRD)     (>60)  mL/min


 


BUN/Creatinine Ratio     (14-18)  


 


Glucose     ()  mg/dL


 


Lactic Acid     (0.4-2.0)  mmol/L


 


Calcium     (8.5-10.1)  mg/dL


 


Magnesium  1.9    (1.8-2.4)  mg/dl


 


CK-MB (CK-2)     (0-3.6)  ng/ml


 


Troponin I     (0.00-0.056)  ng/mL


 


C-Reactive Protein     (<1.0)  mg/dL


 


Prostate Specific Ag     (0.1-4.0)  ng/mL


 


Urine Color    Yellow  (Yellow)  


 


Urine Appearance    Clear  (Clear)  


 


Urine pH    6.0  (5.0-8.0)  


 


Ur Specific Gravity    1.025  (1.005-1.030)  


 


Urine Protein    1+ H  (Negative)  


 


Urine Glucose (UA)    Negative  (Negative)  


 


Urine Ketones    Trace H  (Negative)  


 


Urine Occult Blood    Negative  (Negative)  


 


Urine Nitrite    Negative  (Negative)  


 


Urine Bilirubin    1+ H  (Negative)  


 


Urine Urobilinogen    0.2  (0.2-1.0)  


 


Ur Leukocyte Esterase    Negative  (Negative)  


 


Urine RBC    0-5  (0-5)  /hpf


 


Urine WBC    0-5  (0-5)  /hpf


 


Ur Epithelial Cells    0-5  (0-5)  /hpf


 


Urine Bacteria    Rare  (FEW)  /hpf


 


Urine Mucus    Not seen  (FEW)  /hpf


 


Urine Opiates Screen   Negative   (MPFWSL=275)  


 


Ur Buprenorphine Scrn   Negative   (CUTOFF=10)  


 


Ur Oxycodone Screen   Negative   (GMS2VL=731)  


 


Urine Methadone Screen   Negative   (MUT1EU=676)  


 


Ur Propoxyphene Screen   Negative   (VIHEUS=954)  


 


Ur Barbiturates Screen   Negative   (SGJUNZ=261)  


 


Ur Tricyclics Screen   Negative   (FRILZT=722)  


 


Ur Phencyclidine Scrn   Negative   (CUTOFF=25)  


 


Ur Amphetamine Screen   Negative   (TOCJIY=907)  


 


U Methamphetamines Scrn   Negative   (CUKZVW=635)  


 


U Benzodiazepines Scrn   Negative   (UHQEPE=080)  


 


U Cocaine Metab Screen   Negative   (MPRGEM=621)  


 


U Marijuana (THC) Screen   Negative   (CUTOFF=50)  














  03/05/19 03/05/19 03/05/19 Range/Units





  06:05 06:05 06:05 


 


WBC   8.70   (4.23-9.07)  K/mm3


 


RBC   3.22 L   (4.63-6.08)  M/mm3


 


Hgb   9.5 L   (13.7-17.5)  gm/L


 


Hct   29.1 L   (40.1-51.0)  %


 


MCV   90.4   (79.0-92.2)  fl


 


MCH   29.5   (25.7-32.2)  pg


 


MCHC   32.6   (32.2-35.5)  g/dl


 


RDW Std Deviation   44.0 H   (35.1-43.9)  fL


 


Plt Count   201   (163-337)  K/mm3


 


MPV   11.0   (9.4-12.3)  fl


 


Neut % (Auto)   72.0 H   (34.0-67.9)  %


 


Lymph % (Auto)   21.0 L   (21.8-53.1)  %


 


Mono % (Auto)   6.0   (5.3-12.2)  %


 


Eos % (Auto)   0.6 L   (0.8-7.0)  


 


Baso % (Auto)   0.3   (0.1-1.2)  %


 


Neut # (Auto)   6.26 H   (1.78-5.38)  K/mm3


 


Lymph # (Auto)   1.83   (1.32-3.57)  K/mm3


 


Mono # (Auto)   0.52   (0.30-0.82)  K/mm3


 


Eos # (Auto)   0.05   (0.04-0.54)  K/mm3


 


Baso # (Auto)   0.03   (0.01-0.08)  K/mm3


 


Sodium    143  (136-145)  mEq/L


 


Potassium    3.3 L  (3.5-5.1)  mEq/L


 


Chloride    107  ()  mEq/L


 


Carbon Dioxide    27  (21-32)  mEq/L


 


Anion Gap    12.3  (5-15)  


 


BUN    34 H  (7-18)  mg/dL


 


Creatinine    0.9  (0.7-1.3)  mg/dL


 


Est Cr Clr Drug Dosing    59.36  mL/min


 


Estimated GFR (MDRD)    > 60  (>60)  mL/min


 


BUN/Creatinine Ratio    37.8 H  (14-18)  


 


Glucose    91  ()  mg/dL


 


Lactic Acid     (0.4-2.0)  mmol/L


 


Calcium    7.9 L  (8.5-10.1)  mg/dL


 


Magnesium    2.1  (1.8-2.4)  mg/dl


 


CK-MB (CK-2)     (0-3.6)  ng/ml


 


Troponin I    0.557 H*  (0.00-0.056)  ng/mL


 


C-Reactive Protein    6.4 H*  (<1.0)  mg/dL


 


Prostate Specific Ag  12.7 H    (0.1-4.0)  ng/mL


 


Urine Color     (Yellow)  


 


Urine Appearance     (Clear)  


 


Urine pH     (5.0-8.0)  


 


Ur Specific Gravity     (1.005-1.030)  


 


Urine Protein     (Negative)  


 


Urine Glucose (UA)     (Negative)  


 


Urine Ketones     (Negative)  


 


Urine Occult Blood     (Negative)  


 


Urine Nitrite     (Negative)  


 


Urine Bilirubin     (Negative)  


 


Urine Urobilinogen     (0.2-1.0)  


 


Ur Leukocyte Esterase     (Negative)  


 


Urine RBC     (0-5)  /hpf


 


Urine WBC     (0-5)  /hpf


 


Ur Epithelial Cells     (0-5)  /hpf


 


Urine Bacteria     (FEW)  /hpf


 


Urine Mucus     (FEW)  /hpf


 


Urine Opiates Screen     (KUFKUL=239)  


 


Ur Buprenorphine Scrn     (CUTOFF=10)  


 


Ur Oxycodone Screen     (ULQ4ZH=768)  


 


Urine Methadone Screen     (XXN5OH=935)  


 


Ur Propoxyphene Screen     (UYQCXA=063)  


 


Ur Barbiturates Screen     (RSLUOY=537)  


 


Ur Tricyclics Screen     (JXTOYA=433)  


 


Ur Phencyclidine Scrn     (CUTOFF=25)  


 


Ur Amphetamine Screen     (GAWDLJ=759)  


 


U Methamphetamines Scrn     (ISXEHY=330)  


 


U Benzodiazepines Scrn     (RRXGDD=556)  


 


U Cocaine Metab Screen     (YSCLUE=909)  


 


U Marijuana (THC) Screen     (CUTOFF=50)  














  03/05/19 03/05/19 Range/Units





  06:05 10:27 


 


WBC    (4.23-9.07)  K/mm3


 


RBC    (4.63-6.08)  M/mm3


 


Hgb    (13.7-17.5)  gm/L


 


Hct    (40.1-51.0)  %


 


MCV    (79.0-92.2)  fl


 


MCH    (25.7-32.2)  pg


 


MCHC    (32.2-35.5)  g/dl


 


RDW Std Deviation    (35.1-43.9)  fL


 


Plt Count    (163-337)  K/mm3


 


MPV    (9.4-12.3)  fl


 


Neut % (Auto)    (34.0-67.9)  %


 


Lymph % (Auto)    (21.8-53.1)  %


 


Mono % (Auto)    (5.3-12.2)  %


 


Eos % (Auto)    (0.8-7.0)  


 


Baso % (Auto)    (0.1-1.2)  %


 


Neut # (Auto)    (1.78-5.38)  K/mm3


 


Lymph # (Auto)    (1.32-3.57)  K/mm3


 


Mono # (Auto)    (0.30-0.82)  K/mm3


 


Eos # (Auto)    (0.04-0.54)  K/mm3


 


Baso # (Auto)    (0.01-0.08)  K/mm3


 


Sodium    (136-145)  mEq/L


 


Potassium    (3.5-5.1)  mEq/L


 


Chloride    ()  mEq/L


 


Carbon Dioxide    (21-32)  mEq/L


 


Anion Gap    (5-15)  


 


BUN    (7-18)  mg/dL


 


Creatinine    (0.7-1.3)  mg/dL


 


Est Cr Clr Drug Dosing    mL/min


 


Estimated GFR (MDRD)    (>60)  mL/min


 


BUN/Creatinine Ratio    (14-18)  


 


Glucose    ()  mg/dL


 


Lactic Acid  0.5   (0.4-2.0)  mmol/L


 


Calcium    (8.5-10.1)  mg/dL


 


Magnesium    (1.8-2.4)  mg/dl


 


CK-MB (CK-2)   1.6  (0-3.6)  ng/ml


 


Troponin I   0.423 H*  (0.00-0.056)  ng/mL


 


C-Reactive Protein    (<1.0)  mg/dL


 


Prostate Specific Ag    (0.1-4.0)  ng/mL


 


Urine Color    (Yellow)  


 


Urine Appearance    (Clear)  


 


Urine pH    (5.0-8.0)  


 


Ur Specific Gravity    (1.005-1.030)  


 


Urine Protein    (Negative)  


 


Urine Glucose (UA)    (Negative)  


 


Urine Ketones    (Negative)  


 


Urine Occult Blood    (Negative)  


 


Urine Nitrite    (Negative)  


 


Urine Bilirubin    (Negative)  


 


Urine Urobilinogen    (0.2-1.0)  


 


Ur Leukocyte Esterase    (Negative)  


 


Urine RBC    (0-5)  /hpf


 


Urine WBC    (0-5)  /hpf


 


Ur Epithelial Cells    (0-5)  /hpf


 


Urine Bacteria    (FEW)  /hpf


 


Urine Mucus    (FEW)  /hpf


 


Urine Opiates Screen    (OLDWAX=551)  


 


Ur Buprenorphine Scrn    (CUTOFF=10)  


 


Ur Oxycodone Screen    (XYU4YH=854)  


 


Urine Methadone Screen    (UYK9GX=661)  


 


Ur Propoxyphene Screen    (JDQEOU=649)  


 


Ur Barbiturates Screen    (XKGDBU=827)  


 


Ur Tricyclics Screen    (GTANAE=408)  


 


Ur Phencyclidine Scrn    (CUTOFF=25)  


 


Ur Amphetamine Screen    (IOBBIY=213)  


 


U Methamphetamines Scrn    (LAGUEN=048)  


 


U Benzodiazepines Scrn    (NCCCYQ=995)  


 


U Cocaine Metab Screen    (FMYRES=593)  


 


U Marijuana (THC) Screen    (CUTOFF=50)  











Med Orders - Current: 


 Current Medications





Enoxaparin Sodium (Lovenox)  40 mg SUBCUT Q24H [facility]


   Last Admin: 03/04/19 21:11 Dose:  40 mg


Potassium Chloride (Klor-Con M20)  40 meq PO BID NICOLETTE


   Stop: 03/06/19 09:01


   Last Admin: 03/05/19 09:24 Dose:  40 meq


Sodium Chloride (Saline Flush)  10 ml FLUSH ASDIRECTED PRN


   PRN Reason: Keep Vein Open


   Last Admin: 03/04/19 16:28 Dose:  10 ml





Discontinued Medications





Aspirin (Aspirin)  324 mg PO ONETIME ONE


   Stop: 03/05/19 08:01


   Last Admin: 03/05/19 09:24 Dose:  324 mg


Bisacodyl (Dulcolax)  10 mg RECTAL ONETIME ONE


   Stop: 03/05/19 06:31


   Last Admin: 03/05/19 09:23 Dose:  Not Given


Gadobenate Dimeglumine (Multihance)  12 ml IVPUSH ONETIME ONE


   Stop: 03/05/19 08:02


   Last Admin: 03/05/19 08:30 Dose:  12 ml


Potassium Chloride/Sodium Chloride (Normal Saline With 20 Meq Kcl)  1,000 mls @ 

150 mls/hr IV ASDIRECTED [facility]


   Last Admin: 03/04/19 18:03 Dose:  150 mls/hr


Sodium Chloride (Normal Saline)  1,000 mls @ 999 mls/hr IV ONETIME ONE


   Stop: 03/04/19 21:20


   Last Admin: 03/04/19 21:25 Dose:  Not Given


Magnesium Sulfate 2 gm/ Premix  50 mls @ 25 mls/hr IV ONETIME ONE


   Stop: 03/04/19 22:23


   Last Admin: 03/04/19 21:12 Dose:  25 mls/hr


Lactated Ringer's (Ringers, Lactated)  1,000 mls @ 999 mls/hr IV ONETIME ONE


   Stop: 03/04/19 21:37


   Last Admin: 03/04/19 21:12 Dose:  999 mls/hr


Lactated Ringer's (Ringers, Lactated)  1,000 mls @ 125 mls/hr IV ASDIRECTED [facility]


   Stop: 03/05/19 06:00


   Last Admin: 03/05/19 07:12 Dose:  125 mls/hr


Lactated Ringer's (Ringers, Lactated)  1,000 mls @ 999 mls/hr IV .BOLUS ONE


   Stop: 03/05/19 05:34


   Last Admin: 03/05/19 04:35 Dose:  999 mls/hr


Sodium Chloride (Saline Flush)  10 ml FLUSH ONETIME PRN


   PRN Reason: Keep Vein Open


   Stop: 03/05/19 12:00


   Last Admin: 03/05/19 08:31 Dose:  10 ml











- Exam


Quality Assessment: DVT Prophylaxis


General: Alert, Oriented, Cooperative, No Acute Distress


HEENT: Pupils Equal, Pupils Reactive, EOMI


Neck: Trachea Midline, No JVD


Lungs: Normal Respiratory Effort, Decreased Breath Sounds


Cardiovascular: Regular Rate, Regular Rhythm


GI/Abdominal Exam: Normal Bowel Sounds, Soft, Non-Tender, No Organomegaly, No 

Distention


 (Male) Exam: Deferred


Back Exam: Normal Inspection


Extremities: Normal Inspection, Non-Tender, Normal Capillary Refill


Skin: Warm


Neurological: No New Focal Deficit, Normal Speech, Cranial Nerves Intact


Psy/Mental Status: Alert, Depressed





- Problem List & Annotations


(1) Tobacco dependence


SNOMED Code(s): 55308807


   Code(s): F17.200 - NICOTINE DEPENDENCE, UNSPECIFIED, UNCOMPLICATED   Status: 

Acute   Current Visit: Yes   





(2) Prostate cancer


SNOMED Code(s): 399822000


   Code(s): C61 - MALIGNANT NEOPLASM OF PROSTATE   Status: Acute   Current Visit

: Yes   





(3) Cerebrovascular accident (CVA)


SNOMED Code(s): 115151102


   Code(s): I63.9 - CEREBRAL INFARCTION, UNSPECIFIED   Status: Acute   Current 

Visit: Yes   


Qualifiers: 


   CVA mechanism: unspecified   Qualified Code(s): I63.9 - Cerebral infarction, 

unspecified   





(4) Hypokalemia


SNOMED Code(s): 71582425


   Code(s): E87.6 - HYPOKALEMIA   Status: Acute   Current Visit: Yes   





(5) Laceration of right upper arm


SNOMED Code(s): 601894665


   Code(s): S41.111A - LACERATION W/O FOREIGN BODY OF RIGHT UPPER ARM, INIT 

ENCNTR   Status: Acute   Current Visit: Yes   


Qualifiers: 


   Encounter type: initial encounter   Qualified Code(s): S41.111A - Laceration 

without foreign body of right upper arm, initial encounter   





(6) Suicidal ideation


SNOMED Code(s): 7427276


   Code(s): R45.851 - SUICIDAL IDEATIONS   Status: Acute   Current Visit: Yes   





- Problem List Review


Problem List Initiated/Reviewed/Updated: Yes





- My Orders


Last 24 Hours: 


My Active Orders





03/04/19 19:39


EKG 12 Lead [EK] Routine 





03/04/19 20:14


Head of Bed Elevation [RC] ASDIRECTED 


Neuro Check [RC] QSHIFT 





03/04/19 20:22


Resuscitation Status Routine 





03/04/19 20:23


Notify Provider Consults [RC] 08 





03/04/19 21:00


Enoxaparin [Lovenox]   40 mg SUBCUT Q24H 


Potassium Chloride [Klor-Con M20]   40 meq PO BID 





03/04/19 23:01


Consult to Speech Language Pathology [SLP Evaluation and Treatment] [CONS] 

Routine 





03/05/19 05:43


EKG 12 Lead [EK] Routine 





03/05/19 09:00


Consult to Physician [CONS] Routine 





03/05/19 11:00


Consult to Occupational Therapy [OT Evaluation and Treatment] [CONS] Routine 


Consult to Physical Therapy [PT Evaluation and Treatment] [CONS] Routine 





03/05/19 18:00


PRO B-TYPE NATRIUR PEPT,BNPPRO [CHEM] Routine 


TROPONIN I [CHEM] Routine 





03/05/19 Lunch


Heart Healthy Diet [DIET] 





03/06/19 05:00


BASIC METABOLIC PANEL,BMP [CHEM] DAILY 


CBC WITH AUTO DIFF [HEME] DAILY 


CRP [C-REACTIVE PROTEIN] [CHEM] DAILY 


LACTIC ACID [CHEM] DAILY 


MAGNESIUM [CHEM] DAILY 





03/07/19 05:00


BASIC METABOLIC PANEL,BMP [CHEM] DAILY 


CBC WITH AUTO DIFF [HEME] DAILY 


CRP [C-REACTIVE PROTEIN] [CHEM] DAILY 


LACTIC ACID [CHEM] DAILY 


MAGNESIUM [CHEM] DAILY 





03/08/19 05:00


BASIC METABOLIC PANEL,BMP [CHEM] DAILY 


CBC WITH AUTO DIFF [HEME] DAILY 


CRP [C-REACTIVE PROTEIN] [CHEM] DAILY 


LACTIC ACID [CHEM] DAILY 


MAGNESIUM [CHEM] DAILY 





03/09/19 05:00


BASIC METABOLIC PANEL,BMP [CHEM] DAILY 


CBC WITH AUTO DIFF [HEME] DAILY 


CRP [C-REACTIVE PROTEIN] [CHEM] DAILY 


LACTIC ACID [CHEM] DAILY 


MAGNESIUM [CHEM] DAILY 














- Plan


Plan:: 











Impression:





Suicidal ideation, history of end stage prostate cancer per patient report


Scheduled for PET scan this week;  CTX, Degarelix


S/P bilateral extremity lacerations





CVA, subacute with predominately right sided deficits;  liberal BP mgt.





Dehydration





Hypokalemia





Elevated WBCs, query reactive cf infectious





Elevated Tn (demand ischemia cf ACS); follow up decreasing Tn;  continue ASA 





Imaging, 3-5-19 (carotid, 2D echo, MRI);  MRI small posterior parietal CVA











Chronic


HTN


HLD








Plan:





IVF


CVA protocol


MRI re; mets and CVA.


Bedside swallow evaluation


Ischemic work up;  ECG generalized ST depression, Sinus tach


Lipid panel


EMR from oncologist


Psychiatric consult;  re: depression with suicidal thoughts/gesture


Nicotine replacement


Full code status


DVT prophylaxis

## 2019-03-06 RX ADMIN — Medication ONE: at 12:04

## 2019-03-06 RX ADMIN — ASPIRIN SCH: 325 TABLET, DELAYED RELEASE ORAL at 09:35

## 2019-03-06 RX ADMIN — Medication PRN ML: at 11:21

## 2019-03-06 RX ADMIN — Medication ONE: at 12:32

## 2019-03-06 RX ADMIN — ASPIRIN SCH MG: 325 TABLET, DELAYED RELEASE ORAL at 08:51

## 2019-03-06 NOTE — CT
CT abdomen and pelvis

 

Technique: Multiple axial sections were obtained from above the dome 

of the diaphragm inferiorly through the pubic symphysis.  Intravenous 

contrast was utilized.  No oral contrast has been given.  Delayed 

images were obtained through the bladder.

 

Comparison: No prior abdominal imaging.

 

Findings: Small right sided pleural effusion is seen with trace 

left-sided pleural effusion.  Slight linear scarring is seen within 

both lung bases.  Emphysematous change appears to be present within 

both lung bases.  Liver shows no focal parenchymal abnormality.  

Gallbladder contains no calcified gallstones.  Spleen appears within 

normal limits in size.  

 

There is a small low-density lesion within the inferior spleen 

measuring 4 mm which is likely incidental.  

 

Adrenal glands show no nodule.  Right kidney shows a small cortical 

cyst measuring 4 mm.  Kidneys otherwise appear within normal limits.

 

Dilated descending duodenum is seen which contains intraluminal soft 

tissue material measuring around 3.2 cm.  Uncertain if this is food 

material or represents intraluminal mass.

 

Aorta is slightly aneurysmal with AP dimension of 2.7 cm.  

Atherosclerotic change noted within the aorta.  Mild atelectatic 

common iliac arteries is incidentally noted.

 

Pancreas appears within normal limits.  No retroperitoneal adenopathy 

is seen.  Very slight inflammatory change noted within the right upper

 abdomen.  This appears to be surrounding a diverticulum suspicious 

for mild diverticulitis.  Appendix also seen in this area but appendix

 is normal in size.

 

No pelvic mass or adenopathy is seen.  Radiation implant seeds noted 

within the prostate gland.

 

Delayed images shows contrast within the distal ureters and within the

 bladder.

 

Bone window settings shows degenerative change within the lower lumbar

 spine.

 

Impression:

1.  Small right sided pleural effusion and trace left-sided pleural 

effusion.

2.  Linear scarring within both lung bases as well as emphysematous 

change.

3.  Mild inflammatory change within the upper right abdomen which 

appears to have its epicenter around a colonic diverticulum suspicious

 for mild diverticulitis.

4.  Soft tissue density within a dilated duodenum.  As mentioned 

above, uncertain if this represents food material or represents 

intraluminal mass.  Endoscopy could be considered to further evaluate.

5.  Other incidental findings.

 

Diagnostic code #3

## 2019-03-06 NOTE — PCM.PN
- General Info


Date of Service: 03/06/19


Admission Dx/Problem (Free Text): 


 Admission Diagnosis/Problem





Admission Diagnosis/Problem      CVA, Cerebrovascular accident








Subjective Update: 


In to see Nicholas.  We had a very kanika discussion about his suicidal ideation.  

He reports after strokelike symptoms.  He thought it would be easier to end it 

all.  He does not have any current suicidal ideation.  He was asked point-blank 

if he were to discharge tomorrow if he would go home and attempt suicide to 

which he responded no.  He has been working with PT and OT.  He does have 

sensation in his right arm but no movement as of yet.  He does have weakness in 

his right leg however sensation is intact and he is able to ambulate.  He has 

been having nausea and vomiting today.  Workup was done including a lipase 

which was normal.  CMP was also performed with no transaminitis noted.  

Scopolamine patch, Reglan and Zofran were given with relief this afternoon.  CT 

scan of the abdomen and pelvis was obtained showing "1.  Small right-sided 

pleural effusion and trace left-sided pleural effusion.  2.  Linear scarring 

within both lung bases as well as emphysematous change.  3.  Mild inflammatory 

change within the right upper abdomen which appears to have its epicenter round-

the-clock diverticulum suspicious for mild diverticulitis.  4.  Soft tissue 

density within dilated duodenum.  As mentioned above uncertain if this 

represents food material or represent intraluminal mass.  Endoscopy could be 

performed for further evaluation.  5.  Other incidental findings."  Dr. Pepper 

did see the patient and plan is for EGD tomorrow morning.  He continues to be 

one-on-one.  He did report that he drinks approximately 3 mixed drinks daily.  

This is increased from what he was telling us on initial admission.  Due to 

this we will add C1 protocol and contact [facility] for consult.  Have also started 

supplementation. 





Functional Status: Reports: Pain Controlled, Tolerating Diet, Ambulating (with 

walker ), Urinating.  Denies: New Symptoms





- Review of Systems


General: Reports: Weakness, Fatigue.  Denies: Fever, Malaise, Chills


HEENT: Reports: No Symptoms.  Denies: Headaches, Sore Throat


Pulmonary: Reports: No Symptoms.  Denies: Shortness of Breath, Pleuritic Chest 

Pain, Cough, Sputum


Cardiovascular: Reports: No Symptoms.  Denies: Chest Pain, Palpitations, 

Dyspnea on Exertion, Edema


Gastrointestinal: Reports: Abdominal Pain, Nausea (improved in PM), Vomiting (

Improved in PM).  Denies: Constipation, Diarrhea


Genitourinary: Reports: No Symptoms.  Denies: Pain


Musculoskeletal: Reports: No Symptoms


Skin: Reports: No Symptoms


Neurological: Reports: No Symptoms, Difficulty Walking, Weakness, Gait 

Disturbance.  Denies: Confusion, Dizziness, Headache, Seizure, Syncope, Tremors

, Trouble Speaking, Change in Speech


Psychiatric: Reports: Depression, Anxiety.  Denies: Confusion, Agitation, 

Hallucinations, Suicidal Ideation, Homicidal Ideation





- Patient Data


Vitals - Most Recent: 


 Last Vital Signs











Temp  98.8 F   03/06/19 08:37


 


Pulse  87   03/06/19 08:37


 


Resp  16   03/06/19 08:37


 


BP  140/72   03/06/19 08:37


 


Pulse Ox  96   03/06/19 08:37











Weight - Most Recent: 133 lb 4.8 oz


I&O - Last 24 Hours: 


 Intake & Output











 03/05/19 03/06/19 03/06/19





 22:59 06:59 14:59


 


Intake Total 820 200 


 


Output Total 350 650 


 


Balance 470 -450 











Lab Results Last 24 Hours: 


 Laboratory Results - last 24 hr











  03/05/19 03/05/19 03/05/19 Range/Units





  10:27 18:00 18:00 


 


WBC     (4.23-9.07)  K/mm3


 


RBC     (4.63-6.08)  M/mm3


 


Hgb     (13.7-17.5)  gm/L


 


Hct     (40.1-51.0)  %


 


MCV     (79.0-92.2)  fl


 


MCH     (25.7-32.2)  pg


 


MCHC     (32.2-35.5)  g/dl


 


RDW Std Deviation     (35.1-43.9)  fL


 


Plt Count     (163-337)  K/mm3


 


MPV     (9.4-12.3)  fl


 


Neut % (Auto)     (34.0-67.9)  %


 


Lymph % (Auto)     (21.8-53.1)  %


 


Mono % (Auto)     (5.3-12.2)  %


 


Eos % (Auto)     (0.8-7.0)  


 


Baso % (Auto)     (0.1-1.2)  %


 


Neut # (Auto)     (1.78-5.38)  K/mm3


 


Lymph # (Auto)     (1.32-3.57)  K/mm3


 


Mono # (Auto)     (0.30-0.82)  K/mm3


 


Eos # (Auto)     (0.04-0.54)  K/mm3


 


Baso # (Auto)     (0.01-0.08)  K/mm3


 


Sodium     (136-145)  mEq/L


 


Potassium     (3.5-5.1)  mEq/L


 


Chloride     ()  mEq/L


 


Carbon Dioxide     (21-32)  mEq/L


 


Anion Gap     (5-15)  


 


BUN     (7-18)  mg/dL


 


Creatinine     (0.7-1.3)  mg/dL


 


Est Cr Clr Drug Dosing     mL/min


 


Estimated GFR (MDRD)     (>60)  mL/min


 


BUN/Creatinine Ratio     (14-18)  


 


Glucose     ()  mg/dL


 


Lactic Acid     (0.4-2.0)  mmol/L


 


Calcium     (8.5-10.1)  mg/dL


 


Magnesium     (1.8-2.4)  mg/dl


 


CK-MB (CK-2)  1.6    (0-3.6)  ng/ml


 


Troponin I  0.423 H*  0.369 H*   (0.00-0.056)  ng/mL


 


C-Reactive Protein     (<1.0)  mg/dL


 


NT-Pro-B Natriuret Pep    2473 H  (0-450)  pg/mL


 


Triglycerides     (<150)  mg/dL


 


Cholesterol     (<200)  mg/dL


 


LDL Cholesterol Direct     (<100)  mg/dL


 


HDL Cholesterol     (40-59)  mg/dL














  03/06/19 03/06/19 03/06/19 Range/Units





  05:07 05:07 05:07 


 


WBC  9.79 H    (4.23-9.07)  K/mm3


 


RBC  3.05 L    (4.63-6.08)  M/mm3


 


Hgb  9.2 L    (13.7-17.5)  gm/L


 


Hct  27.9 L    (40.1-51.0)  %


 


MCV  91.5    (79.0-92.2)  fl


 


MCH  30.2    (25.7-32.2)  pg


 


MCHC  33.0    (32.2-35.5)  g/dl


 


RDW Std Deviation  44.9 H    (35.1-43.9)  fL


 


Plt Count  270    (163-337)  K/mm3


 


MPV  11.1    (9.4-12.3)  fl


 


Neut % (Auto)  81.9 H    (34.0-67.9)  %


 


Lymph % (Auto)  12.7 L    (21.8-53.1)  %


 


Mono % (Auto)  4.8 L    (5.3-12.2)  %


 


Eos % (Auto)  0.2 L    (0.8-7.0)  


 


Baso % (Auto)  0.1    (0.1-1.2)  %


 


Neut # (Auto)  8.02 H    (1.78-5.38)  K/mm3


 


Lymph # (Auto)  1.24 L    (1.32-3.57)  K/mm3


 


Mono # (Auto)  0.47    (0.30-0.82)  K/mm3


 


Eos # (Auto)  0.02 L    (0.04-0.54)  K/mm3


 


Baso # (Auto)  0.01    (0.01-0.08)  K/mm3


 


Sodium   143   (136-145)  mEq/L


 


Potassium   4.5   (3.5-5.1)  mEq/L


 


Chloride   109 H   ()  mEq/L


 


Carbon Dioxide   30   (21-32)  mEq/L


 


Anion Gap   8.5   (5-15)  


 


BUN   39 H   (7-18)  mg/dL


 


Creatinine   0.9   (0.7-1.3)  mg/dL


 


Est Cr Clr Drug Dosing   57.33   mL/min


 


Estimated GFR (MDRD)   > 60   (>60)  mL/min


 


BUN/Creatinine Ratio   43.3 H   (14-18)  


 


Glucose   154 H   ()  mg/dL


 


Lactic Acid    0.6  (0.4-2.0)  mmol/L


 


Calcium   8.2 L   (8.5-10.1)  mg/dL


 


Magnesium   1.8   (1.8-2.4)  mg/dl


 


CK-MB (CK-2)     (0-3.6)  ng/ml


 


Troponin I     (0.00-0.056)  ng/mL


 


C-Reactive Protein   5.4 H*   (<1.0)  mg/dL


 


NT-Pro-B Natriuret Pep     (0-450)  pg/mL


 


Triglycerides   160 H   (<150)  mg/dL


 


Cholesterol   135   (<200)  mg/dL


 


LDL Cholesterol Direct   75   (<100)  mg/dL


 


HDL Cholesterol   38.0 L   (40-59)  mg/dL











Med Orders - Current: 


 Current Medications





Al Hydroxide/Mg Hydroxide (Mag-Al Plus)  30 ml PO Q4H PRN


   PRN Reason: Heartburn


   Last Admin: 03/06/19 02:47 Dose:  30 ml


Aspirin (Ecotrin)  325 mg PO DAILY [facility]


   Last Admin: 03/06/19 09:35 Dose:  Not Given


Calcium Carbonate/Glycine (Tums)  500 mg PO Q2H PRN


   PRN Reason: Indigestion


   Last Admin: 03/06/19 08:30 Dose:  500 mg


Enoxaparin Sodium (Lovenox)  40 mg SUBCUT Q24H [facility]


   Last Admin: 03/05/19 20:12 Dose:  40 mg


Fluoxetine HCl (Prozac)  20 mg PO DAILY [facility]


   Last Admin: 03/06/19 10:21 Dose:  20 mg


Hydralazine HCl (Apresoline)  10 mg IVPUSH Q8H PRN


   PRN Reason: Hypertension


Lorazepam (Ativan)  0.5 mg PO BID [facility]


Metoclopramide HCl (Reglan)  5 mg IVPUSH Q6H PRN


   PRN Reason: Nausea


   Last Admin: 03/06/19 05:07 Dose:  5 mg


Miscellaneous Information (Remove Patch)  0 ea TRDERM ONETIME ONE


   Stop: 03/09/19 08:01


Ondansetron HCl (Zofran)  4 mg IVPUSH Q4H PRN


   PRN Reason: Nausea


   Last Admin: 03/06/19 01:34 Dose:  4 mg


Pantoprazole Sodium (Protonix***)  40 mg PO BIDAC [facility]


Sodium Chloride (Saline Flush)  10 ml FLUSH ASDIRECTED PRN


   PRN Reason: Keep Vein Open


   Last Admin: 03/04/19 16:28 Dose:  10 ml


Sucralfate (Carafate)  1 gm PO TIDAC [facility]





Discontinued Medications





Aspirin (Aspirin)  324 mg PO ONETIME ONE


   Stop: 03/05/19 08:01


   Last Admin: 03/05/19 09:24 Dose:  324 mg


Bisacodyl (Dulcolax)  10 mg RECTAL ONETIME ONE


   Stop: 03/05/19 06:31


   Last Admin: 03/05/19 09:23 Dose:  Not Given


Furosemide (Lasix)  20 mg IVPUSH ONETIME ONE


   Stop: 03/06/19 09:01


   Last Admin: 03/06/19 08:40 Dose:  20 mg


Gadobenate Dimeglumine (Multihance)  12 ml IVPUSH ONETIME ONE


   Stop: 03/05/19 08:02


   Last Admin: 03/05/19 08:30 Dose:  12 ml


Potassium Chloride/Sodium Chloride (Normal Saline With 20 Meq Kcl)  1,000 mls @ 

150 mls/hr IV ASDIRECTED [facility]


   Last Admin: 03/04/19 18:03 Dose:  150 mls/hr


Sodium Chloride (Normal Saline)  1,000 mls @ 999 mls/hr IV ONETIME ONE


   Stop: 03/04/19 21:20


   Last Admin: 03/04/19 21:25 Dose:  Not Given


Magnesium Sulfate 2 gm/ Premix  50 mls @ 25 mls/hr IV ONETIME ONE


   Stop: 03/04/19 22:23


   Last Admin: 03/04/19 21:12 Dose:  25 mls/hr


Lactated Ringer's (Ringers, Lactated)  1,000 mls @ 999 mls/hr IV ONETIME ONE


   Stop: 03/04/19 21:37


   Last Admin: 03/04/19 21:12 Dose:  999 mls/hr


Lactated Ringer's (Ringers, Lactated)  1,000 mls @ 125 mls/hr IV ASDIRECTED [facility]


   Stop: 03/05/19 06:00


   Last Admin: 03/05/19 07:12 Dose:  125 mls/hr


Lactated Ringer's (Ringers, Lactated)  1,000 mls @ 999 mls/hr IV .BOLUS ONE


   Stop: 03/05/19 05:34


   Last Admin: 03/05/19 04:35 Dose:  999 mls/hr


Pantoprazole Sodium (Protonix Iv***)  40 mg IVPUSH ONETIME ONE


   Stop: 03/06/19 11:01


Potassium Chloride (Klor-Con M20)  40 meq PO BID [facility]


   Stop: 03/06/19 09:01


   Last Admin: 03/05/19 20:12 Dose:  40 meq


Scopolamine (Transderm-Scop)  1.5 mg TRDERM Q72H PRN


   PRN Reason: Nausea/Vomiting


Scopolamine (Transderm-Scop)  1.5 mg TOP ONETIME ONE


   Stop: 03/06/19 08:01


   Last Admin: 03/06/19 08:38 Dose:  1.5 mg


Sodium Chloride (Saline Flush)  10 ml FLUSH ONETIME PRN


   PRN Reason: Keep Vein Open


   Stop: 03/05/19 12:00


   Last Admin: 03/05/19 08:31 Dose:  10 ml











- Exam


Quality Assessment: DVT Prophylaxis


General: Alert, Oriented, Cooperative, No Acute Distress


HEENT: Pupils Equal, Pupils Reactive, EOMI, Mucous Membr. Moist/Pink


Neck: Supple, Trachea Midline, No JVD


Lungs: Clear to Auscultation, Normal Respiratory Effort


Cardiovascular: Regular Rate, Regular Rhythm


GI/Abdominal Exam: Normal Bowel Sounds, Soft, Non-Tender, No Organomegaly, No 

Distention


 (Male) Exam: Deferred


Back Exam: Normal Inspection, Full Range of Motion


Extremities: Normal Inspection, Non-Tender, No Pedal Edema, Normal Capillary 

Refill, Limited Range of Motion (Unable to move right arm and decreased 

strength in right leg.)


Peripheral Pulses: 3+: Radial (L), Radial (R), Dorsalis Pedis (L), Dorsalis 

Pedis (R)


Skin: Warm, Dry, Intact


Neurological: No New Focal Deficit, Normal Speech, Normal Tone, Sensation 

Intact.  No: Normal Gait, Strength Equal Bilateral


Psy/Mental Status: Alert, Anxious, Depressed.  No: Labile Mood, Agitated, 

Suicidal Ideation, Homicidal Ideation, Hallucinations, Withdrawal Symptoms





- Problem List & Annotations


(1) Nausea & vomiting


SNOMED Code(s): 13091936


   Code(s): R11.2 - NAUSEA WITH VOMITING, UNSPECIFIED   Status: Acute   Priority

: High   Current Visit: Yes   


Qualifiers: 


   Vomiting type: unspecified   Vomiting Intractability: unspecified   

Qualified Code(s): R11.2 - Nausea with vomiting, unspecified   





(2) Cerebrovascular accident (CVA)


SNOMED Code(s): 956521828


   Code(s): I63.9 - CEREBRAL INFARCTION, UNSPECIFIED   Status: Acute   Priority

: High   Current Visit: Yes   


Qualifiers: 


   CVA mechanism: unspecified   Qualified Code(s): I63.9 - Cerebral infarction, 

unspecified   





(3) Hypokalemia


SNOMED Code(s): 71146722


   Code(s): E87.6 - HYPOKALEMIA   Status: Acute   Priority: High   Current Visit

: Yes   





(4) Laceration of right upper arm


SNOMED Code(s): 582427546


   Code(s): S41.111A - LACERATION W/O FOREIGN BODY OF RIGHT UPPER ARM, INIT 

ENCNTR   Status: Acute   Priority: High   Current Visit: Yes   


Qualifiers: 


   Encounter type: initial encounter   Qualified Code(s): S41.111A - Laceration 

without foreign body of right upper arm, initial encounter   





(5) Prostate cancer


SNOMED Code(s): 099889883


   Code(s): C61 - MALIGNANT NEOPLASM OF PROSTATE   Status: Acute   Priority: 

High   Current Visit: Yes   





(6) Suicidal ideation


SNOMED Code(s): 8334901


   Code(s): R45.851 - SUICIDAL IDEATIONS   Status: Acute   Priority: High   

Current Visit: Yes   





(7) Tobacco dependence


SNOMED Code(s): 68661030


   Code(s): F17.200 - NICOTINE DEPENDENCE, UNSPECIFIED, UNCOMPLICATED   Status: 

Acute   Priority: High   Current Visit: Yes   





- Problem List Review


Problem List Initiated/Reviewed/Updated: Yes





- My Orders


Last 24 Hours: 


My Active Orders





03/06/19 08:02


Calcium Carbonate [Tums]   500 mg PO Q2H PRN 





03/06/19 11:00


Sucralfate [Carafate]   1 gm PO TIDAC 





03/06/19 11:01


Abdomen Pelvis w wo Cont [CT] Routine 





03/06/19 11:02


Consult for Substance Abuse [CONS] Routine 





03/06/19 11:15


Lactated Ringers @ 100 MLS/HR(1000ml Bag) Lactated Ringers [Ringers, Lactated] 1

,000 ml IV ASDIRECTED 





03/06/19 16:00


Pantoprazole [ProTONIX***]   40 mg PO BIDAC 





03/09/19 08:00


Remove Patch   0 ea TRDERM ONETIME ONE 














- Plan


Plan:: 


Impression:





Suicidal ideation, history of end stage prostate cancer per patient report


Scheduled for PET scan this week;  CTX, Degarelix





S/P bilateral extremity lacerations 2/2 suicide attempt





CVA, subacute with predominately right sided deficits;  liberal BP mgt.





Dehydration -> resolved 





Hypokalemia -> resolved 





Elevated WBCs, query reactive cf infectious





Elevated Tn (demand ischemia cf ACS); follow up decreasing Tn;  continue ASA; 

Trending down





Imaging, 3-5-19 (carotid, 2D echo, MRI);  MRI small posterior parietal CVA





Nausea and vomiting





Questionable duodenal mass on Abdominal/Pelvis CT - Dr. Rosenthal Consulted 











Chronic


HTN


HLD








Plan:





IVF


1:1


CVA protocol


MRI re; mets and CVA.


Bedside swallow evaluation ->passed


Ischemic work up;  ECG generalized ST depression, Sinus tach


Lipid panel


EMR from oncologist


Psychiatric consult;  re: depression with suicidal thoughts/gesture


Nicotine replacement


Consult Dr. Awad for EGD on 3/7/19


NPO after midnight 


CT abdomen/Pelvis


Full code status


DVT prophylaxis

## 2019-03-06 NOTE — CONS
CONSULTING PHYSICIAN:  Andreas Howard MD

DATE OF CONSULTATION:  03/06/2019

 

This is a 60-minute inpatient telemedicine event.  Site where the services are

provided to are [facility] in [place], [place].  Site

where the services are provided from our offices in [facility].  Length of

time for this 60-minute inpatient telemedicine event is 60 minutes.

 

IDENTIFICATION:

The patient is a 78-year-old male who was admitted to the inpatient Med/Surg

Unit at [facility] in [place], [place].  He is seen for

psychiatric evaluation per the request of staff attending Dr. Nguyễn and her

treatment team.

 

CHIEF COMPLAINT:

"I decided to cut my wrist and bleed."

 

HISTORY OF PRESENT ILLNESS:

The patient is a 78-year-old male who was admitted to the hospital on 03/04/2019

after a suicide attempt where he cut his wrist in the face of alcohol

intoxication.  He states that he recently had a stroke and had been depressed

about the stroke.  He states "I am scared" about his medical condition.  He

states that he has been struggling with symptoms of the stroke because he has

been living by himself.  Staff is reporting that the patient is refusing to

participate in any type of physical rehab activities, he is refusing to be

transferred to inpatient transitional rehab units because he just wanted to go

home "to be by myself."  He is denying any suicidal or homicidal thoughts at

this point in time.  He is denying any psychotic, delusional, or paranoid

symptoms, but he is stating that he just "wants to be left alone" according to

staff.

 

MEDICATIONS:

At the time of presentation, none.

 

ALLERGIES:

No known drug allergies.

 

PAST MEDICAL HISTORY:

1. Status post stroke within the past 2 weeks.

2. History of hypertension.

3. History of prostate cancer.

4. Status post suicide attempt by cutting wrist.

 

REVIEW OF SYSTEMS:

Aside from neurologic, cardiovascular, genitourinary, and musculoskeletal, all

other major organ systems are negative at this point in time for acute

difficulties or complications.

 

FAMILY PSYCHIATRIC AND CD HISTORY:

The patient denies any previous psychiatric hospitalizations or chemical

dependency treatments.  Denies any previous suicide attempts.  Denies any self-

injurious behaviors in the past.  Denies any past psychiatric medication

treatments.

 

SOCIAL HISTORY:

The patient was born and raised in [facility].  He is

, but , single now.  Not involved in any current relationships.

He has 2 children, one daughter whom he is close to, but he is refusing to let

hospital staff contact the children regarding his clinical situation.  He is

retired from [employer].

 

MENTAL STATUS EXAM:

The patient is a 78-year-old white male, in no apparent distress.  Speech is of

increased latency of response, short in duration of utterance.  The patient is

cognitively oriented x3 to person, place, and time.  Psychomotor activity is

within normal limits.  No abnormal motor movements or tics observed.  Gait and

station are not observed as the patient is lying in bed during the course of the

interview.  Mood is "scared."  Affect is restricted, but cooperative overall for

the purposes of the inpatient telemedicine consult even though he makes very

poor eye contact during the course of the interview.  There is no behavioral or

stated evidence of acute suicidal or homicidal ideation or acute psychotic,

delusional, or paranoid symptoms.  Thought blocking does appear to be somewhat

evident, however, there are no manic symptoms or loose associations evident.

Judgment and insight do appear impaired at this point in time secondary to the

severity of his depression and perhaps also from the sequela from the stroke.

Motivation for help is poor.

 

VITAL SIGNS:

136/90, 74, 16, 97.8 degrees.

 

IMPRESSION:

Axis I:

1. Major depressive disorder, F32.3.

2. Anxiety disorder, not otherwise specified, F41.9.

3. Rule out alcohol abuse versus dependence.

 

Axis II:  None.

Axis III:

1. Status post stroke.

2. History of prostate cancer.

3. History of hypertension.

4. Status post wrist lacerations bilaterally.

Axis IV:  Severe.

Axis V: 50.

 

PLAN:

1. Sobriety.

2. AA rep to visit the patient while on the unit.

3. Pastoral guidance.

4. Staff has recommended to consider CD consult if needed.

5. Begin Prozac 20 mg.  No overt symptoms of depression.

6. Begin Ativan 0.5 mg b.i.d. for anxiety reduction.

7. Recommend when the patient is medically stabilized that he be transferred

    to Inpatient Psychiatry for further psychiatric stabilization and safety

    given the severity of his depressive symptoms upon presentation.

8. We will continue followup with the patient on an as-needed basis while he

    remains on the inpatient Med/Surg Unit at [facility] in

    [place], [place].

9. We will follow up with the patient sooner if any complications in the

    interim.

10.Crisis plan is in place.

 

DD:  03/06/2019 14:19:16

DT:  03/06/2019 18:45:32  FRANCA

Job #:  [id number]

## 2019-03-06 NOTE — PCM.PREANE
Preanesthetic Assessment





- Anesthesia/Transfusion/Family Hx


Anesthesia History: Prior Anesthesia Without Reaction


Family History of Anesthesia Reaction: No


Transfusion History: No Prior Transfusion(s)





- Review of Systems


General: Fatigue, Malaise


Pulmonary: No Symptoms


Cardiovascular: Dyspnea on Exertion


Gastrointestinal: Abdominal Pain, Nausea, Vomiting


Neurological: Weakness (right arm), Gait Disturbance





- Physical Assessment


NPO Status Date: 03/06/19


NPO Status Time: 13:30


Pulse: 96


O2 Sat by Pulse Oximetry: 94


Respiratory Rate: 18


Blood Pressure: 136/60


Temperature: 36.6 C


Vital Signs: 





 Last Vital Signs











Temp  36.6 C   03/06/19 12:18


 


Pulse  96   03/06/19 12:18


 


Resp  18   03/06/19 12:18


 


BP  136/60   03/06/19 12:18


 


Pulse Ox  94 L  03/06/19 12:18











Height: 1.73 m


Weight: 60.464 kg


ASA Class: 3


Mental Status: Alert & Oriented x3


Airway Class: Mallampati = 1


Dentition: Reports: Dentures


Thyro-Mental Finger Breadths: 3


Mouth Opening Finger Breadths: 3


ROM/Head Extension: Full


Lungs: Clear to Auscultation, Normal Respiratory Effort


Cardiovascular: Regular Rate, Regular Rhythm





- Lab


Values: 





 Laboratory Last Values











WBC  9.79 K/mm3 (4.23-9.07)  H  03/06/19  05:07    


 


RBC  3.05 M/mm3 (4.63-6.08)  L  03/06/19  05:07    


 


Hgb  9.2 gm/L (13.7-17.5)  L  03/06/19  05:07    


 


Hct  27.9 % (40.1-51.0)  L  03/06/19  05:07    


 


MCV  91.5 fl (79.0-92.2)   03/06/19  05:07    


 


MCH  30.2 pg (25.7-32.2)   03/06/19  05:07    


 


MCHC  33.0 g/dl (32.2-35.5)   03/06/19  05:07    


 


RDW Std Deviation  44.9 fL (35.1-43.9)  H  03/06/19  05:07    


 


Plt Count  270 K/mm3 (163-337)   03/06/19  05:07    


 


MPV  11.1 fl (9.4-12.3)   03/06/19  05:07    


 


Neut % (Auto)  81.9 % (34.0-67.9)  H  03/06/19  05:07    


 


Lymph % (Auto)  12.7 % (21.8-53.1)  L  03/06/19  05:07    


 


Mono % (Auto)  4.8 % (5.3-12.2)  L  03/06/19  05:07    


 


Eos % (Auto)  0.2  (0.8-7.0)  L  03/06/19  05:07    


 


Baso % (Auto)  0.1 % (0.1-1.2)   03/06/19  05:07    


 


Neut # (Auto)  8.02 K/mm3 (1.78-5.38)  H  03/06/19  05:07    


 


Lymph # (Auto)  1.24 K/mm3 (1.32-3.57)  L  03/06/19  05:07    


 


Mono # (Auto)  0.47 K/mm3 (0.30-0.82)   03/06/19  05:07    


 


Eos # (Auto)  0.02 K/mm3 (0.04-0.54)  L  03/06/19  05:07    


 


Baso # (Auto)  0.01 K/mm3 (0.01-0.08)   03/06/19  05:07    


 


PT  10.9 SECONDS (9.5-12.1)   03/04/19  16:35    


 


INR  1.00   03/04/19  16:35    


 


APTT  25 SECONDS (24-31)   03/04/19  16:35    


 


Sodium  144 mEq/L (136-145)   03/06/19  05:07    


 


Potassium  4.6 mEq/L (3.5-5.1)   03/06/19  05:07    


 


Chloride  109 mEq/L ()  H  03/06/19  05:07    


 


Carbon Dioxide  27 mEq/L (21-32)   03/06/19  05:07    


 


Anion Gap  12.6  (5-15)   03/06/19  05:07    


 


BUN  38 mg/dL (7-18)  H  03/06/19  05:07    


 


Creatinine  0.9 mg/dL (0.7-1.3)   03/06/19  05:07    


 


Est Cr Clr Drug Dosing  58.78 mL/min  03/06/19  05:07    


 


Estimated GFR (MDRD)  > 60 mL/min (>60)   03/06/19  05:07    


 


BUN/Creatinine Ratio  42.2  (14-18)  H  03/06/19  05:07    


 


Glucose  157 mg/dL ()  H  03/06/19  05:07    


 


POC Glucose  124 mg/dL ()  H  03/04/19  16:15    


 


Lactic Acid  0.6 mmol/L (0.4-2.0)   03/06/19  05:07    


 


Calcium  8.4 mg/dL (8.5-10.1)  L  03/06/19  05:07    


 


Magnesium  1.8 mg/dl (1.8-2.4)   03/06/19  05:07    


 


Total Bilirubin  0.6 mg/dL (0.2-1.0)   03/06/19  05:07    


 


AST  22 U/L (15-37)   03/06/19  05:07    


 


ALT  15 U/L (16-63)  L  03/06/19  05:07    


 


Alkaline Phosphatase  66 U/L ()   03/06/19  05:07    


 


CK-MB (CK-2)  1.6 ng/ml (0-3.6)   03/05/19  10:27    


 


Troponin I  0.369 ng/mL (0.00-0.056)  H*  03/05/19  18:00    


 


C-Reactive Protein  5.4 mg/dL (<1.0)  H*  03/06/19  05:07    


 


NT-Pro-B Natriuret Pep  2473 pg/mL (0-450)  H  03/05/19  18:00    


 


Total Protein  5.6 g/dl (6.4-8.2)  L  03/06/19  05:07    


 


Albumin  2.4 g/dl (3.4-5.0)  L  03/06/19  05:07    


 


Globulin  3.2 gm/dL  03/06/19  05:07    


 


Albumin/Globulin Ratio  0.8  (1-2)  L  03/06/19  05:07    


 


Triglycerides  160 mg/dL (<150)  H  03/06/19  05:07    


 


Cholesterol  135 mg/dL (<200)   03/06/19  05:07    


 


LDL Cholesterol Direct  75 mg/dL (<100)   03/06/19  05:07    


 


HDL Cholesterol  38.0 mg/dL (40-59)  L  03/06/19  05:07    


 


Lipase  169 U/L ()   03/06/19  05:07    


 


Prostate Specific Ag  12.7 ng/mL (0.1-4.0)  H  03/05/19  06:05    


 


TSH 3rd Generation  2.290 uIU/mL (0.358-3.74)   03/04/19  16:35    


 


Urine Color  Yellow  (Yellow)   03/04/19  17:47    


 


Urine Appearance  Clear  (Clear)   03/04/19  17:47    


 


Urine pH  6.0  (5.0-8.0)   03/04/19  17:47    


 


Ur Specific Gravity  1.025  (1.005-1.030)   03/04/19  17:47    


 


Urine Protein  1+  (Negative)  H  03/04/19  17:47    


 


Urine Glucose (UA)  Negative  (Negative)   03/04/19  17:47    


 


Urine Ketones  Trace  (Negative)  H  03/04/19  17:47    


 


Urine Occult Blood  Negative  (Negative)   03/04/19  17:47    


 


Urine Nitrite  Negative  (Negative)   03/04/19  17:47    


 


Urine Bilirubin  1+  (Negative)  H  03/04/19  17:47    


 


Urine Urobilinogen  0.2  (0.2-1.0)   03/04/19  17:47    


 


Ur Leukocyte Esterase  Negative  (Negative)   03/04/19  17:47    


 


Urine RBC  0-5 /hpf (0-5)   03/04/19  17:47    


 


Urine WBC  0-5 /hpf (0-5)   03/04/19  17:47    


 


Ur Epithelial Cells  0-5 /hpf (0-5)   03/04/19  17:47    


 


Urine Bacteria  Rare /hpf (FEW)   03/04/19  17:47    


 


Urine Mucus  Not seen /hpf (FEW)   03/04/19  17:47    


 


Salicylates  0.5 mg/dL (2.8-20)  L  03/04/19  16:35    


 


Urine Opiates Screen  Negative  (SWUZIJ=079)   03/04/19  17:47    


 


Ur Buprenorphine Scrn  Negative  (CUTOFF=10)   03/04/19  17:47    


 


Ur Oxycodone Screen  Negative  (ZOG7QC=135)   03/04/19  17:47    


 


Urine Methadone Screen  Negative  (ZQI8IG=767)   03/04/19  17:47    


 


Ur Propoxyphene Screen  Negative  (VFHTBS=827)   03/04/19  17:47    


 


Acetaminophen  0 ug/mL (10-30)  L  03/04/19  16:35    


 


Ur Barbiturates Screen  Negative  (VGINYS=563)   03/04/19  17:47    


 


Ur Tricyclics Screen  Negative  (LUNMDC=479)   03/04/19  17:47    


 


Ur Phencyclidine Scrn  Negative  (CUTOFF=25)   03/04/19  17:47    


 


Ur Amphetamine Screen  Negative  (LVQGAL=811)   03/04/19  17:47    


 


U Methamphetamines Scrn  Negative  (ASQSVS=915)   03/04/19  17:47    


 


U Benzodiazepines Scrn  Negative  (FAAYQY=054)   03/04/19  17:47    


 


U Cocaine Metab Screen  Negative  (ERZEVM=792)   03/04/19  17:47    


 


U Marijuana (THC) Screen  Negative  (CUTOFF=50)   03/04/19  17:47    


 


Ethyl Alcohol  0.00 gm% (0.00)   03/04/19  16:35    














- Imaging/EKG


Impressions: 





on chart








- Allergies


Allergies/Adverse Reactions: 


 Allergies











Allergy/AdvReac Type Severity Reaction Status Date / Time


 


No Known Allergies Allergy   Verified 03/04/19 16:13














- Blood


Blood Available: No


Product(s) Available: None





- Anesthesia Plan


Pre-Op Medication Ordered: None





- Acknowledgements


Anesthesia Type Planned: MAC


Pt an Appropriate Candidate for the Planned Anesthesia: Yes


Alternatives and Risks of Anesthesia Discussed w Pt/Guardian: Yes


Pt/Guardian Understands and Agrees with Anesthesia Plan: Yes





PreAnesthesia Questionnaire


HEENT History: Reports: Cataract, Impaired Vision


Cardiovascular History: Reports: Hypertension


Gastrointestinal History: Reports: GERD


Other Genitourinary History: Prostate Cancer


Musculoskeletal History: Reports: Arthritis


Neurological History: Reports: Other (See Below) (had a stroke a week ago 

weekness on right side)


Psychiatric History: Reports: Addiction, Suicide Attempt


Other Psychiatric History: Suicide attempt 3/1/19-3/2/19


Oncologic (Cancer) History: Reports: Prostate





- Infectious Disease History


Infectious Disease History: Reports: Chicken Pox





- Past Surgical History


Male  Surgical History: Reports: Other (See Below)


Other Male  Surgeries/Procedures: patient has seeds placed for prostrate 

cancer.





- SUBSTANCE USE


Smoking Status *Q: Current Every Day Smoker


Tobacco Use Within Last Twelve Months: Cigarettes, Pipe


Second Hand Smoke Exposure: No


Days Per Week of Alcohol Use: 6


Number of Drinks Per Day: 4


Total Drinks Per Week: 24


Date of Last Drink: 03/04/19


Time of Last Drink: 12:00


Recreational Drug Use History: No





- HOME MEDS


Home Medications: 


 Home Meds





Degarelix [Firmagon] 80 mg SQ ASDIRECTED 03/04/19 [History]


Losartan/Hydrochlorothiazide [Hyzaar 100-12.5 Tablet] 1 each PO DAILY 03/04/19 [

History]


amLODIPine Besylate [Amlodipine Besylate] 5 mg PO DAILY 03/04/19 [History]











- CURRENT (IN HOUSE) MEDS


Current Meds: 





 Current Medications





Al Hydroxide/Mg Hydroxide (Mag-Al Plus)  30 ml PO Q4H PRN


   PRN Reason: Heartburn


   Last Admin: 03/06/19 02:47 Dose:  30 ml


Aspirin (Ecotrin)  325 mg PO DAILY [facility]


   Last Admin: 03/06/19 09:35 Dose:  Not Given


Calcium Carbonate/Glycine (Tums)  500 mg PO Q2H PRN


   PRN Reason: Indigestion


   Last Admin: 03/06/19 08:30 Dose:  500 mg


Enoxaparin Sodium (Lovenox)  40 mg SUBCUT Q24H [facility]


   Last Admin: 03/05/19 20:12 Dose:  40 mg


Fluoxetine HCl (Prozac)  20 mg PO DAILY [facility]


   Last Admin: 03/06/19 10:21 Dose:  20 mg


Hydralazine HCl (Apresoline)  10 mg IVPUSH Q8H PRN


   PRN Reason: Hypertension


Lactated Ringer's (Ringers, Lactated)  1,000 mls @ 100 mls/hr IV ASDIRECTED [facility]


   Last Admin: 03/06/19 12:33 Dose:  100 mls/hr


Lorazepam (Ativan)  0.5 mg PO BID [facility]


Lorazepam (Ativan)  1 - 3 mg IVPUSH Q2H PRN; Protocol


   PRN Reason: withdrawl


Miscellaneous Information (Remove Patch)  0 ea TRDERM ONETIME ONE


   Stop: 03/09/19 08:01


Miscellaneous Information (Remove Patch)  1 ea TRDERM Q24H [facility]


Nicotine (Habitrol)  14 mg TRDERM Q24H [facility]


   Last Admin: 03/06/19 12:02 Dose:  Not Given


Ondansetron HCl (Zofran)  4 mg IVPUSH Q4H PRN


   PRN Reason: Nausea


   Last Admin: 03/06/19 01:34 Dose:  4 mg


Pantoprazole Sodium (Protonix***)  40 mg PO BIDAC [facility]


Sodium Chloride (Saline Flush)  10 ml FLUSH ASDIRECTED PRN


   PRN Reason: Keep Vein Open


   Last Admin: 03/04/19 16:28 Dose:  10 ml


Sucralfate (Carafate)  1 gm PO TIDAC [facility]


   Last Admin: 03/06/19 12:31 Dose:  1 gm


Thiamine HCl (Vitamin B-1)  100 mg IV DAILY [facility]





Discontinued Medications





Aspirin (Aspirin)  324 mg PO ONETIME ONE


   Stop: 03/05/19 08:01


   Last Admin: 03/05/19 09:24 Dose:  324 mg


Bisacodyl (Dulcolax)  10 mg RECTAL ONETIME ONE


   Stop: 03/05/19 06:31


   Last Admin: 03/05/19 09:23 Dose:  Not Given


Furosemide (Lasix)  20 mg IVPUSH ONETIME ONE


   Stop: 03/06/19 09:01


   Last Admin: 03/06/19 08:40 Dose:  20 mg


Gadobenate Dimeglumine (Multihance)  12 ml IVPUSH ONETIME ONE


   Stop: 03/05/19 08:02


   Last Admin: 03/05/19 08:30 Dose:  12 ml


Potassium Chloride/Sodium Chloride (Normal Saline With 20 Meq Kcl)  1,000 mls @ 

[facility]


   Last Admin: 03/04/19 18:03 Dose:  150 mls/hr


Sodium Chloride (Normal Saline)  1,000 mls @ 999 mls/hr IV ONETIME ONE


   Stop: 03/04/19 21:20


   Last Admin: 03/04/19 21:25 Dose:  Not Given


Magnesium Sulfate 2 gm/ Premix  50 mls @ 25 mls/hr IV ONETIME ONE


   Stop: 03/04/19 22:23


   Last Admin: 03/04/19 21:12 Dose:  25 mls/hr


Lactated Ringer's (Ringers, Lactated)  1,000 mls @ 999 mls/hr IV ONETIME ONE


   Stop: 03/04/19 21:37


   Last Admin: 03/04/19 21:12 Dose:  999 mls/hr


Lactated Ringer's (Ringers, Lactated)  1,000 mls [facility]


   Stop: 03/05/19 06:00


   Last Admin: 03/05/19 07:12 Dose:  125 mls/hr


Lactated Ringer's (Ringers, Lactated)  1,000 mls @ 999 mls/hr IV .BOLUS ONE


   Stop: 03/05/19 05:34


   Last Admin: 03/05/19 04:35 Dose:  999 mls/hr


Iopamidol (Isovue-370 (76%))  100 ml IVPUSH ONETIME ONE


   Stop: 03/06/19 11:19


   Last Admin: 03/06/19 11:21 Dose:  100 ml


Metoclopramide HCl (Reglan)  5 mg IVPUSH Q6H PRN


   PRN Reason: Nausea


   Last Admin: 03/06/19 05:07 Dose:  5 mg


Pantoprazole Sodium (Protonix Iv***)  40 mg IVPUSH ONETIME ONE


   Stop: 03/06/19 11:01


   Last Admin: 03/06/19 12:32 Dose:  40 mg


Potassium Chloride (Klor-Con M20)  40 meq PO BID [facility]


   Stop: 03/06/19 09:01


   Last Admin: 03/05/19 20:12 Dose:  40 meq


Scopolamine (Transderm-Scop)  1.5 mg TRDERM Q72H PRN


   PRN Reason: Nausea/Vomiting


Scopolamine (Transderm-Scop)  1.5 mg TOP ONETIME ONE


   Stop: 03/06/19 08:01


   Last Admin: 03/06/19 08:38 Dose:  1.5 mg


Sodium Chloride (Saline Flush)  10 ml FLUSH ONETIME PRN


   PRN Reason: Keep Vein Open


   Stop: 03/05/19 12:00


   Last Admin: 03/06/19 11:21 Dose:  10 ml


Sodium Chloride (Saline Flush)  10 ml FLUSH ONETIME ONE


   Stop: 03/06/19 11:19


   Last Admin: 03/06/19 12:32 Dose:  Not Given

## 2019-03-07 PROCEDURE — 0DB98ZX EXCISION OF DUODENUM, VIA NATURAL OR ARTIFICIAL OPENING ENDOSCOPIC, DIAGNOSTIC: ICD-10-PCS | Performed by: SURGERY

## 2019-03-07 PROCEDURE — 0DB68ZX EXCISION OF STOMACH, VIA NATURAL OR ARTIFICIAL OPENING ENDOSCOPIC, DIAGNOSTIC: ICD-10-PCS | Performed by: SURGERY

## 2019-03-07 PROCEDURE — 0DB48ZX EXCISION OF ESOPHAGOGASTRIC JUNCTION, VIA NATURAL OR ARTIFICIAL OPENING ENDOSCOPIC, DIAGNOSTIC: ICD-10-PCS | Performed by: SURGERY

## 2019-03-07 PROCEDURE — 30233N1 TRANSFUSION OF NONAUTOLOGOUS RED BLOOD CELLS INTO PERIPHERAL VEIN, PERCUTANEOUS APPROACH: ICD-10-PCS | Performed by: INTERNAL MEDICINE

## 2019-03-07 NOTE — PCM.DCSUM1
**Discharge Summary





- Hospital Course


HPI Initial Comments: 


78 year old male, right handed with history of end stage prostate cancer , 

presented after cutting his arms because, "I wanted to bleed to death."  He 

apparently noticed that he was unable to move his upper and lower extremities 

starting Thursday.   It is unclear when he may have wanted to harm himself;  he 

lives alone. The patient is a full code and does not want his family notified 

regarding his admission. He is outside the time frame for a thrombolytic.  





Diagnosis: Stroke: Yes


Modified [place] Scale: Mod.Disablility Requiring Some Help,Able to Walk Without 

Assistance


Modified [place] Scale Score: 3





- Discharge Data


Discharge Date: 19 (Admit date: 3/4/19)


Discharge Disposition: DC/Tfer to Acute Hospital 02


Condition: Stable





- Discharge Diagnosis/Problem(s)


(1) Nausea & vomiting


SNOMED Code(s): 18191180


   ICD Code: R11.2 - NAUSEA WITH VOMITING, UNSPECIFIED   Status: Acute   

Priority: High   Current Visit: Yes   


Qualifiers: 


   Vomiting type: unspecified   Vomiting Intractability: unspecified   

Qualified Code(s): R11.2 - Nausea with vomiting, unspecified   





(2) Cerebrovascular accident (CVA)


SNOMED Code(s): 065242387


   ICD Code: I63.9 - CEREBRAL INFARCTION, UNSPECIFIED   Status: Acute   Priority

: High   Current Visit: Yes   


Qualifiers: 


   CVA mechanism: unspecified   Qualified Code(s): I63.9 - Cerebral infarction, 

unspecified   





(3) Hypokalemia


SNOMED Code(s): 68726994


   ICD Code: E87.6 - HYPOKALEMIA   Status: Acute   Priority: High   Current 

Visit: Yes   





(4) Laceration of right upper arm


SNOMED Code(s): 012294414


   ICD Code: S41.111A - LACERATION W/O FOREIGN BODY OF RIGHT UPPER ARM, INIT 

ENCNTR   Status: Acute   Priority: High   Current Visit: Yes   


Qualifiers: 


   Encounter type: initial encounter   Qualified Code(s): S41.111A - Laceration 

without foreign body of right upper arm, initial encounter   





(5) Prostate cancer


SNOMED Code(s): 220578471


   ICD Code: C61 - MALIGNANT NEOPLASM OF PROSTATE   Status: Acute   Priority: 

High   Current Visit: Yes   





(6) Suicidal ideation


SNOMED Code(s): 9345820


   ICD Code: R45.851 - SUICIDAL IDEATIONS   Status: Acute   Priority: High   

Current Visit: Yes   





(7) Tobacco dependence


SNOMED Code(s): 09206381


   ICD Code: F17.200 - NICOTINE DEPENDENCE, UNSPECIFIED, UNCOMPLICATED   Status

: Acute   Priority: High   Current Visit: Yes   





(8) Chronic alcohol use


SNOMED Code(s): 199586


   ICD Code: Z72.89 - OTHER PROBLEMS RELATED TO LIFESTYLE   Status: Chronic   

Priority: High   Current Visit: Yes   





(9) Non-STEMI (non-ST elevated myocardial infarction)


SNOMED Code(s): 79474155


   ICD Code: I21.4 - NON-ST ELEVATION (NSTEMI) MYOCARDIAL INFARCTION   Status: 

Acute   Priority: High   Current Visit: Yes   





(10) GI bleed


SNOMED Code(s): 25678965


   ICD Code: K92.2 - GASTROINTESTINAL HEMORRHAGE, UNSPECIFIED   Status: Acute   

Priority: High   Current Visit: Yes   


Qualifiers: 


   GI bleed type/associated pathology: unspecified gastrointestinal hemorrhage 

type   Qualified Code(s): K92.2 - Gastrointestinal hemorrhage, unspecified   





- Patient Summary/Data


Operative Procedure(s) Performed: egd with bx


Consults: 


 Consultations





19 23:01


Consult to Speech Language Pathology [SLP Evaluation and Treatment] [CONS] 

Routine 





19 09:00


Consult to Physician [CONS] Routine 





19 11:00


Consult to Occupational Therapy [OT Evaluation and Treatment] [CONS] Routine 


Consult to Physical Therapy [PT Evaluation and Treatment] [CONS] Routine 





19 09:51


Consult to Spiritual Care [CONS] Routine 





19 11:02


Consult for Substance Abuse [CONS] Routine 





19 12:35


Consult to Physician [CONS] Routine 











Hospital Course: 


Nicholas Mcgarry is a 77 yo male who presented to our ED on 3/4/19 via [facility] with strokelike symptoms.  Reports the stroke has started the prior 

Thursday, 4 days before presentation and he was last known well around 6 PM.  

Reports severe weakness to his right arm and moderate weakness to his right 

leg.  He lives alone and did not call anyone for help.  He reports on Saturday 

night, 3/2/19 he became depressed and cut himself in 3 places placed on the 

wrist and one on the right before meals.  His intention was to kill himself 

however they did not believe that much.  He was crawling around his house to 

get around.  He called 911 for help at that time.  He did not eat or drink much 

for the past few days.  He does have prostate cancer, which she reports has 

been present since . Dr. Duran is his oncologist.  He has had prostate beads 

placed for this.  He notes his PSA has been rising so his urologist and 

oncologist working on a different plan.  He also reports he was drinking more 

over the past few days.  He is noted to be a daily smoker and he does report 4 

days per week of alcohol use initially. CT scan of his head was obtained and 

shows senescent change but no acute intracranial abnormality.  EKG showed sinus 

tachycardia with ST depression globally.  Troponin was initially less than 

0.017.  TSH was obtained and was normal and EtOH was negative.  They also 

checked several salicylates and acetaminophen which were both normal.  He 

denied being suicidal in the ED. his potassium was also found to be very low at 

2.5 and this was supplemented. UA was negative.  He was admitted to the Med/

Surg floor, 1:1 with suicide and aspiration precautions. 





The following morning after being admitted his troponin was noted to be 

elevated at 0.557.  He was then given 325 mg of aspirin which was continued 

daily.  He was also on 40 mg Levaquin daily.  His troponins did continue to 

trend down after the initial high on the first day of admission. 12-lead EKG 

showed no change and no ST elevation.  Echocardiogram was obtained showin.  

LVEF, by visual estimation, is 6065%.  2.  Normal right ventricular size, wall 

thickness, and systolic function.  3.  There is moderate aortic valve sclerosis 

without stenosis.  4.  The right ventricular systolic pressure is normal."  MRI 

was obtained and interpreted by our radiologist Dr. Espinoza as "1.  Small fairly 

acute white matter infarct is noted within the posterior left parietal region 

too.  Senescent changes noted above.  3.  No abnormal enhancement is seen."  

Speech language pathology did see him and clear him for a normal diet.  Because 

of the suicide attempt he did see tele-psychiatry, Dr. Howard.  Plan of care per 

Dr. Howard was "1.  Sobriety.  2.  A react to visit the patient while on the 

unit.  3.  Past oral guidance.  4.  Staff has recommended consider CDE consult 

if needed.  5.  Begin Prozac 20 mg.  No overt symptoms of depression.  6.  

Begin Ativan 0.5 mg twice a day for anxiety reduction.  7.  Recommend when the 

patient is medically stabilized that he be transferred to inpatient psychiatry 

for further psychiatric stabilization and safety given the severity of his 

depression symptoms upon presentation.  8.  We will continue to follow-up with 

the patient on an as-needed basis while he remains on the inpatient med/surge 

unit at [facility] in [facility].  9.  We will follow

-up with the patient sooner if any complications in the interim.  10.  Crisis 

panel and place." While the patient was talking with Dr. Howard he did confess 

that he drinks more than originally stated.  He reported drinking 3 mixed 

drinks daily.  License addiction counselor was ordered however she was unable 

to see the patient while he was on the floor.  Given his chronic alcohol use a 

multivitamin, full gases, and thiamine supplementation were started.  CIWA 

protocol was also initiated with his CIWA remaining 0-2 at the highest.  He did 

continue to work with PT OT.  He did show some improvement on his weakness with 

his right leg however his right arm main flaccid.  He did report sensation 

throughout all extremities. 





On 3/6/18 patient was noting increased nausea and had vomited several times. He 

also reported abdominal pain. Zofran and scopolamine patch were ordered.  

Lipase was checked and was normal.  H. pylori was checked and was normal.  

Patient was at that time unable to eat due to frequent vomiting.  CT scan of 

the abdomen and pelvis was ordered with IV contrast, as the patient was unable 

to keep down any oral contrast.  This was interpreted by our radiologist Dr. Espinoza as "1.  Small right-sided pleural effusion and trace left-sided pleural 

effusion.  2.  Linear scarring within both lung bases as well as emphysematous 

change.  3.  Mild inflammatory change within the right upper right abdomen 

which appears to have its epicenter around a colonic diverticulum suspicious 

for mild diverticulitis.  4.  Soft tissue density within the dilated duodenum.  

As mentioned above, uncertain if this represents food material represents 

intraluminal mass.  Endoscopy could be considered to further evaluate.  5.  

Other incidental findings.  Dr. Awad, general surgeon, was consulted with the 

plan to perform a EGD this morning, 3/7/19.  His hemoglobin in the ED was 11.1 

and this dropped to the low nines after his first 2 days.  This morning he was 

noted to have a hemoglobin of 7.1.  Occult blood was obtained and was positive 

for stool. It is also reported that he had a black tarry stool last night and 

this morning.  2 units of blood were ordered along with 40 mg IV push Lasix 

between. EGD was performed.  I will defer to Dr. Robbins consultation note for 

details, however patient was noted to have ulceration with possible mass in the 

duodenum.  Because of this Dr. Awad recommended transfer to [place].  He had 

been receiving by mouth Protonix and 40 mg IV push for tonics dose was given 

prior to transport.  Dr. Awad contacted Dr. Fitch, hospitalist, at [facility] in [place] who accepted the patient.  Report was also given by 

myself to Dr. Casiano.  Patient transferred to [place] via [facility] ground ambulance 

with blood running.  His benefit of transfer were discussed with patient and 

although he was quite sleepy from sedation we did have a conversation and he 

reported that he was okay with transfer.











- Patient Instructions


Diet: NPO





- Discharge Plan


*PRESCRIPTION DRUG MONITORING PROGRAM REVIEWED*: No


*COPY OF PRESCRIPTION DRUG MONITORING REPORT IN PATIENT JULIA: No


Home Medications: 


 Home Meds





Degarelix [Firmagon] 80 mg SQ ASDIRECTED 19 [History]


Losartan/Hydrochlorothiazide [Hyzaar 100-12.5 Tablet] 1 each PO DAILY 19 [

History]


amLODIPine Besylate [Amlodipine Besylate] 5 mg PO DAILY 19 [History]








Oxygen Therapy Mode: Room Air


Patient Handouts:  Stroke Prevention, Easy-to-Read, Ischemic Stroke, Easy-to-

Read, Steps to Quit Smoking


Forms:  ED Department Discharge


Referrals: 


Dominic Brandt MD [Primary Care Provider] - 





- Discharge Summary/Plan Comment


DC Time >30 min.: Yes (60 minutes )





- General Info


Date of Service: 19


Admission Dx/Problem (Free Text: 


 Admission Diagnosis/Problem





Admission Diagnosis/Problem      CVA, Cerebrovascular accident








Subjective Update: 


Patient was seen twice today, once prior to EGD and once after.  Hemoglobin was 

noted to be low today although patient reported he felt very good today, even 

better than yesterday.  He did report he was mildly dizzy when he got up to 

have a bowel movement.  Otherwise no complaints.  Bowel movement was noted to 

be dark and tarry by nursing.  Occult stool was obtained and was positive.  Post

-EGD patient was quite sleepy.  Did communicate with me and denied any pain or 

other symptoms.  Discussed plan for transfer and he was in agreement.





Functional Status: Reports: Pain Controlled, Tolerating Diet, Ambulating, 

Urinating, New Symptoms (Dark Tarry stools )





- Review of Systems


General: Reports: No Symptoms, Weakness (improved ).  Denies: Fever, Fatigue, 

Malaise, Chills


HEENT: Reports: No Symptoms.  Denies: Headaches, Sore Throat


Pulmonary: Reports: No Symptoms.  Denies: Shortness of Breath, Pleuritic Chest 

Pain, Cough, Sputum, Hemoptysis, Wheezing


Cardiovascular: Reports: No Symptoms.  Denies: Chest Pain, Palpitations, 

Dyspnea on Exertion, Edema


Gastrointestinal: Reports: Melena.  Denies: Abdominal Pain, Constipation, Nausea

, Vomiting


Genitourinary: Reports: No Symptoms.  Denies: Pain


Musculoskeletal: Reports: No Symptoms


Skin: Reports: No Symptoms


Neurological: Reports: Difficulty Walking, Gait Disturbance.  Denies: Dizziness

, Headache, Numbness, Seizure, Tremors, Trouble Speaking, Change in Speech


Psychiatric: Reports: Depression, Anxiety.  Denies: Confusion





- Patient Data


Vitals - Most Recent: 


 Last Vital Signs











Temp  98.6 F   19 11:00


 


Pulse  69   19 10:41


 


Resp  12   19 11:00


 


BP  142/53 H  19 11:00


 


Pulse Ox  99   19 11:00











Weight - Most Recent: 132 lb 6.4 oz


I&O - Last 24 hours: 


 Intake & Output











 19





 22:59 06:59 14:59


 


Intake Total 735 2213 0


 


Output Total 1100  


 


Balance -365 2213 0











Lab Results - Last 24 hrs: 


 Laboratory Results - last 24 hr











  19 Range/Units





  05:07 05:25 05:25 


 


WBC   6.33   (4.23-9.07)  K/mm3


 


RBC   2.41 L   (4.63-6.08)  M/mm3


 


Hgb   7.1 L*   (13.7-17.5)  gm/L


 


Hct   22.3 L   (40.1-51.0)  %


 


MCV   92.5 H   (79.0-92.2)  fl


 


MCH   29.5   (25.7-32.2)  pg


 


MCHC   31.8 L   (32.2-35.5)  g/dl


 


RDW Std Deviation   45.3 H   (35.1-43.9)  fL


 


Plt Count   185   (163-337)  K/mm3


 


MPV   11.3   (9.4-12.3)  fl


 


Neut % (Auto)   62.9   (34.0-67.9)  %


 


Lymph % (Auto)   27.5   (21.8-53.1)  %


 


Mono % (Auto)   6.6   (5.3-12.2)  %


 


Eos % (Auto)   2.5   (0.8-7.0)  


 


Baso % (Auto)   0.2   (0.1-1.2)  %


 


Neut # (Auto)   3.98   (1.78-5.38)  K/mm3


 


Lymph # (Auto)   1.74   (1.32-3.57)  K/mm3


 


Mono # (Auto)   0.42   (0.30-0.82)  K/mm3


 


Eos # (Auto)   0.16   (0.04-0.54)  K/mm3


 


Baso # (Auto)   0.01   (0.01-0.08)  K/mm3


 


Manual Slide Review   Normal smear   


 


Sodium  144   142  (136-145)  mEq/L


 


Potassium  4.6   3.6  (3.5-5.1)  mEq/L


 


Chloride  109 H   107  ()  mEq/L


 


Carbon Dioxide  27   28  (21-32)  mEq/L


 


Anion Gap  12.6   10.6  (5-15)  


 


BUN  38 H   40 H  (7-18)  mg/dL


 


Creatinine  0.9   1.0  (0.7-1.3)  mg/dL


 


Est Cr Clr Drug Dosing  58.78   52.55  mL/min


 


Estimated GFR (MDRD)  > 60   > 60  (>60)  mL/min


 


BUN/Creatinine Ratio  42.2 H   40.0 H  (14-18)  


 


Glucose  157 H   109  ()  mg/dL


 


Lactic Acid     (0.4-2.0)  mmol/L


 


Calcium  8.4 L   8.1 L  (8.5-10.1)  mg/dL


 


Magnesium    1.7 L  (1.8-2.4)  mg/dl


 


Iron     ()  ug/dL


 


TIBC     (100-400)  ug/dL


 


% Saturation     (20-55)  %


 


Transferrin     (202-364)  mg/dL


 


Total Bilirubin  0.6    (0.2-1.0)  mg/dL


 


AST  22    (15-37)  U/L


 


ALT  15 L    (16-63)  U/L


 


Alkaline Phosphatase  66    ()  U/L


 


C-Reactive Protein    2.5 H*  (<1.0)  mg/dL


 


Total Protein  5.6 L    (6.4-8.2)  g/dl


 


Albumin  2.4 L    (3.4-5.0)  g/dl


 


Globulin  3.2    gm/dL


 


Albumin/Globulin Ratio  0.8 L    (1-2)  


 


Lipase  169    ()  U/L


 


Blood Type     


 


Gel Antibody Screen     


 


Crossmatch     














  19 Range/Units





  05:25 05:25 05:25 


 


WBC     (4.23-9.07)  K/mm3


 


RBC     (4.63-6.08)  M/mm3


 


Hgb     (13.7-17.5)  gm/L


 


Hct     (40.1-51.0)  %


 


MCV     (79.0-92.2)  fl


 


MCH     (25.7-32.2)  pg


 


MCHC     (32.2-35.5)  g/dl


 


RDW Std Deviation     (35.1-43.9)  fL


 


Plt Count     (163-337)  K/mm3


 


MPV     (9.4-12.3)  fl


 


Neut % (Auto)     (34.0-67.9)  %


 


Lymph % (Auto)     (21.8-53.1)  %


 


Mono % (Auto)     (5.3-12.2)  %


 


Eos % (Auto)     (0.8-7.0)  


 


Baso % (Auto)     (0.1-1.2)  %


 


Neut # (Auto)     (1.78-5.38)  K/mm3


 


Lymph # (Auto)     (1.32-3.57)  K/mm3


 


Mono # (Auto)     (0.30-0.82)  K/mm3


 


Eos # (Auto)     (0.04-0.54)  K/mm3


 


Baso # (Auto)     (0.01-0.08)  K/mm3


 


Manual Slide Review     


 


Sodium     (136-145)  mEq/L


 


Potassium     (3.5-5.1)  mEq/L


 


Chloride     ()  mEq/L


 


Carbon Dioxide     (21-32)  mEq/L


 


Anion Gap     (5-15)  


 


BUN     (7-18)  mg/dL


 


Creatinine     (0.7-1.3)  mg/dL


 


Est Cr Clr Drug Dosing     mL/min


 


Estimated GFR (MDRD)     (>60)  mL/min


 


BUN/Creatinine Ratio     (14-18)  


 


Glucose     ()  mg/dL


 


Lactic Acid  0.8    (0.4-2.0)  mmol/L


 


Calcium     (8.5-10.1)  mg/dL


 


Magnesium     (1.8-2.4)  mg/dl


 


Iron    33 L  ()  ug/dL


 


TIBC    180  (100-400)  ug/dL


 


% Saturation    18 L  (20-55)  %


 


Transferrin    144 L  (202-364)  mg/dL


 


Total Bilirubin     (0.2-1.0)  mg/dL


 


AST     (15-37)  U/L


 


ALT     (16-63)  U/L


 


Alkaline Phosphatase     ()  U/L


 


C-Reactive Protein     (<1.0)  mg/dL


 


Total Protein     (6.4-8.2)  g/dl


 


Albumin     (3.4-5.0)  g/dl


 


Globulin     gm/dL


 


Albumin/Globulin Ratio     (1-2)  


 


Lipase     ()  U/L


 


Blood Type   O POSITIVE   


 


Gel Antibody Screen   Negative   


 


Crossmatch   See Detail   











JAMAL Results - Last 24 hrs: 


 Microbiology











 19 08:00 Stool Occult Blood (JAMAL) - Final





 Stool / Feces 


 


 19 18:06 Helicobacter pylori Antigen - Final





 Stool / Feces    NEGATIVE H. PYLORI AG











Med Orders - Current: 


 Current Medications





Al Hydroxide/Mg Hydroxide (Mag-Al Plus)  30 ml PO Q4H PRN


   PRN Reason: Heartburn


   Last Admin: 19 02:47 Dose:  30 ml


Calcium Carbonate/Glycine (Tums)  500 mg PO Q2H PRN


   PRN Reason: Indigestion


   Last Admin: 19 08:30 Dose:  500 mg


Fluoxetine HCl (Prozac)  20 mg PO DAILY NICOLETTE


   Last Admin: 19 11:19 Dose:  Not Given


Folic Acid (Folic Acid)  1 mg PO DAILY NICOLETTE


   Last Admin: 19 10:30 Dose:  Not Given


Furosemide (Lasix)  40 mg IVPUSH NOW ONE


   Stop: 19 12:01


Hydralazine HCl (Apresoline)  10 mg IVPUSH Q8H PRN


   PRN Reason: Hypertension


Lactated Ringer's (Ringers, Lactated)  1,000 mls @ 100 mls/hr IV ASDIRECTED [facility]


   Last Admin: 19 08:36 Dose:  100 mls/hr


Sodium Chloride (Normal Saline)  250 mls @ 100 mls/hr IV ASDIRECTED [facility]


   Stop: 19 14:00


   Last Admin: 19 09:33 Dose:  100 mls/hr


Lorazepam (Ativan)  0.5 mg PO BID [facility]


   Last Admin: 19 10:30 Dose:  Not Given


Lorazepam (Ativan)  1 - 3 mg IVPUSH Q2H PRN; Protocol


   PRN Reason: withdrawl


Miscellaneous Information (Remove Patch)  0 ea TRDERM ONETIME ONE


   Stop: 19 08:01


Miscellaneous Information (Remove Patch)  1 ea TRDERM Q24H [facility]


   Last Admin: 19 11:16 Dose:  Not Given


Multivitamins (Thera)  1 each PO DAILY [facility]


   Last Admin: 19 10:30 Dose:  Not Given


Nicotine (Habitrol)  14 mg TRDERM Q24H [facility]


   Last Admin: 19 11:15 Dose:  Not Given


Ondansetron HCl (Zofran)  4 mg IVPUSH Q4H PRN


   PRN Reason: Nausea


   Last Admin: 19 01:34 Dose:  4 mg


Pantoprazole Sodium (Protonix***)  40 mg PO BIDAC [facility]


   Last Admin: 19 08:37 Dose:  Not Given


Sodium Chloride (Saline Flush)  10 ml FLUSH ASDIRECTED PRN


   PRN Reason: Keep Vein Open


   Last Admin: 19 16:28 Dose:  10 ml


Sucralfate (Carafate)  1 gm PO TIDAC [facility]


   Last Admin: 19 11:19 Dose:  Not Given


Thiamine HCl (Vitamin B-1)  100 mg IV DAILY [facility]


   Last Admin: 19 09:26 Dose:  100 mg





Discontinued Medications





Aspirin (Aspirin)  324 mg PO ONETIME ONE


   Stop: 19 08:01


   Last Admin: 19 09:24 Dose:  324 mg


Aspirin (Ecotrin)  325 mg PO DAILY [facility]


   Last Admin: 19 09:35 Dose:  Not Given


Bisacodyl (Dulcolax)  10 mg RECTAL ONETIME ONE


   Stop: 19 06:31


   Last Admin: 19 09:23 Dose:  Not Given


Enoxaparin Sodium (Lovenox)  40 mg SUBCUT Q24H [facility]


   Last Admin: 19 20:20 Dose:  Not Given


Fentanyl (Sublimaze) Confirm Administered Dose 100 mcg .ROUTE .STK-MED ONE


   Stop: 19 07:22


Furosemide (Lasix)  20 mg IVPUSH ONETIME ONE


   Stop: 19 09:01


   Last Admin: 19 08:40 Dose:  20 mg


Gadobenate Dimeglumine (Multihance)  12 ml IVPUSH ONETIME ONE


   Stop: 19 08:02


   Last Admin: 19 08:30 Dose:  12 ml


Potassium Chloride/Sodium Chloride (Normal Saline With 20 Meq Kcl)  1,000 mls @ 

150 mls/hr IV ASDIRECTED [facility]


   Last Admin: 19 18:03 Dose:  150 mls/hr


Sodium Chloride (Normal Saline)  1,000 mls @ 999 mls/hr IV ONETIME ONE


   Stop: 19 21:20


   Last Admin: 19 21:25 Dose:  Not Given


Magnesium Sulfate 2 gm/ Premix  50 mls @ 25 mls/hr IV ONETIME ONE


   Stop: 19 22:23


   Last Admin: 19 21:12 Dose:  25 mls/hr


Lactated Ringer's (Ringers, Lactated)  1,000 mls @ 999 mls/hr IV ONETIME ONE


   Stop: 19 21:37


   Last Admin: 19 21:12 Dose:  999 mls/hr


Lactated Ringer's (Ringers, Lactated)  1,000 mls @ 125 mls/hr IV ASDIRECTED [facility]


   Stop: 19 06:00


   Last Admin: 19 07:12 Dose:  125 mls/hr


Lactated Ringer's (Ringers, Lactated)  1,000 mls @ 999 mls/hr IV .BOLUS ONE


   Stop: 19 05:34


   Last Admin: 19 04:35 Dose:  999 mls/hr


Sodium Chloride (Normal Saline) Confirm Administered Dose 250 mls @ as directed 

.ROUTE .STK-MED ONE


   Stop: 19 09:25


   Last Admin: 19 09:51 Dose:  Not Given


Iopamidol (Isovue-370 (76%))  100 ml IVPUSH ONETIME ONE


   Stop: 19 11:19


   Last Admin: 19 11:21 Dose:  100 ml


Metoclopramide HCl (Reglan)  5 mg IVPUSH Q6H PRN


   PRN Reason: Nausea


   Last Admin: 19 05:07 Dose:  5 mg


Pantoprazole Sodium (Protonix Iv***)  40 mg IVPUSH ONETIME ONE


   Stop: 19 11:01


   Last Admin: 19 12:32 Dose:  40 mg


Pantoprazole Sodium (Protonix Iv***)  40 mg IVPUSH ONETIME ONE


   Stop: 19 11:10


   Last Admin: 19 11:22 Dose:  40 mg


Potassium Chloride (Klor-Con M20)  40 meq PO BID [facility]TT[facility]


   Stop: 19 09:01


   Last Admin: 19 20:12 Dose:  40 meq


Propofol (Diprivan  20 Ml) Confirm Administered Dose 200 mg .ROUTE .STK-MED ONE


   Stop: 19 07:22


Scopolamine (Transderm-Scop)  1.5 mg TRDERM Q72H PRN


   PRN Reason: Nausea/Vomiting


Scopolamine (Transderm-Scop)  1.5 mg TOP ONETIME ONE


   Stop: 19 08:01


   Last Admin: 19 08:38 Dose:  1.5 mg


Sodium Chloride (Saline Flush)  10 ml FLUSH ONETIME PRN


   PRN Reason: Keep Vein Open


   Stop: 19 12:00


   Last Admin: 19 11:21 Dose:  10 ml


Sodium Chloride (Saline Flush)  10 ml FLUSH ONETIME ONE


   Stop: 19 11:19


   Last Admin: 19 12:32 Dose:  Not Given











- Exam


Quality Assessment: Reports: DVT Prophylaxis


General: Reports: Alert, Oriented, Cooperative, No Acute Distress


HEENT: Reports: Pupils Equal, Pupils Reactive, EOMI, Mucous Membr. Moist/Pink


Neck: Reports: Supple, Trachea Midline, No JVD


Lungs: Reports: Clear to Auscultation, Normal Respiratory Effort


Cardiovascular: Reports: Regular Rate, Regular Rhythm


GI/Abdominal Exam: Normal Bowel Sounds, Soft, Non-Tender, No Organomegaly, No 

Distention


 (Male) Exam: Deferred


Rectal (Males) Exam: Deferred


Back Exam: Reports: Normal Inspection, Full Range of Motion


Extremities: Normal Inspection, Normal Range of Motion, Non-Tender, No Pedal 

Edema, Normal Capillary Refill, Other (Bandaging to arms)


Skin: Reports: Warm, Dry, Intact


Neurological: Reports: No New Focal Deficit, Normal Speech, Normal Tone, 

Sensation Intact.  Denies: Normal Gait, Strength Equal Bilateral (Flaccid right 

arm and weak right leg)


Psy/Mental Status: Reports: Alert, Anxious, Depressed.  Denies: Labile Mood, 

Agitated, Suicidal Ideation, Homicidal Ideation, Hallucinations, Withdrawal 

Symptoms

## 2019-03-07 NOTE — PCM.POSTAN
POST ANESTHESIA ASSESSMENT





- MENTAL STATUS


Mental Status: Somnolent





- VITAL SIGNS


Pulse Rate: 69


SaO2: 100


Resp Rate: 10


Blood Pressure: 110/38


Temperature: 37.0 C





- RESPIRATORY


Respiratory Status: Respiratory Rate WNL, Airway Patent, O2 Saturation Stable, 

Supplemental Oxygen





- CARDIOVASCULAR


CV Status: Pulse Rate WNL, Blood Pressure Stable





- GASTROINTESTINAL


GI Status: No Symptoms





- PAIN


Pain Score: 0





- POST OP HYDRATION


Hydration Status: Adequate & Stable

## 2019-03-07 NOTE — PCM.PN
- General Info


Date of Service: 03/07/19


Admission Dx/Problem (Free Text): 


 Admission Diagnosis/Problem





Admission Diagnosis/Problem      CVA, Cerebrovascular accident











- Patient Data


Vitals - Most Recent: 


 Last Vital Signs











Temp  98.2 F   03/07/19 07:42


 


Pulse  67   03/07/19 07:42


 


Resp  16   03/07/19 07:42


 


BP  131/53 L  03/07/19 07:42


 


Pulse Ox  96   03/07/19 07:42











Weight - Most Recent: 132 lb 6.4 oz


I&O - Last 24 Hours: 


 Intake & Output











 03/06/19 03/07/19 03/07/19





 22:59 06:59 14:59


 


Intake Total 735 2213 


 


Output Total 1100  


 


Balance -365 2213 











Lab Results Last 24 Hours: 


 Laboratory Results - last 24 hr











  03/06/19 03/07/19 03/07/19 Range/Units





  05:07 05:25 05:25 


 


WBC   6.33   (4.23-9.07)  K/mm3


 


RBC   2.41 L   (4.63-6.08)  M/mm3


 


Hgb   7.1 L*   (13.7-17.5)  gm/L


 


Hct   22.3 L   (40.1-51.0)  %


 


MCV   92.5 H   (79.0-92.2)  fl


 


MCH   29.5   (25.7-32.2)  pg


 


MCHC   31.8 L   (32.2-35.5)  g/dl


 


RDW Std Deviation   45.3 H   (35.1-43.9)  fL


 


Plt Count   185   (163-337)  K/mm3


 


MPV   11.3   (9.4-12.3)  fl


 


Neut % (Auto)   62.9   (34.0-67.9)  %


 


Lymph % (Auto)   27.5   (21.8-53.1)  %


 


Mono % (Auto)   6.6   (5.3-12.2)  %


 


Eos % (Auto)   2.5   (0.8-7.0)  


 


Baso % (Auto)   0.2   (0.1-1.2)  %


 


Neut # (Auto)   3.98   (1.78-5.38)  K/mm3


 


Lymph # (Auto)   1.74   (1.32-3.57)  K/mm3


 


Mono # (Auto)   0.42   (0.30-0.82)  K/mm3


 


Eos # (Auto)   0.16   (0.04-0.54)  K/mm3


 


Baso # (Auto)   0.01   (0.01-0.08)  K/mm3


 


Sodium  144    (136-145)  mEq/L


 


Potassium  4.6    (3.5-5.1)  mEq/L


 


Chloride  109 H    ()  mEq/L


 


Carbon Dioxide  27    (21-32)  mEq/L


 


Anion Gap  12.6    (5-15)  


 


BUN  38 H    (7-18)  mg/dL


 


Creatinine  0.9    (0.7-1.3)  mg/dL


 


Est Cr Clr Drug Dosing  58.78    mL/min


 


Estimated GFR (MDRD)  > 60    (>60)  mL/min


 


BUN/Creatinine Ratio  42.2 H    (14-18)  


 


Glucose  157 H    ()  mg/dL


 


Lactic Acid    0.8  (0.4-2.0)  mmol/L


 


Calcium  8.4 L    (8.5-10.1)  mg/dL


 


Total Bilirubin  0.6    (0.2-1.0)  mg/dL


 


AST  22    (15-37)  U/L


 


ALT  15 L    (16-63)  U/L


 


Alkaline Phosphatase  66    ()  U/L


 


Total Protein  5.6 L    (6.4-8.2)  g/dl


 


Albumin  2.4 L    (3.4-5.0)  g/dl


 


Globulin  3.2    gm/dL


 


Albumin/Globulin Ratio  0.8 L    (1-2)  


 


Lipase  169    ()  U/L











Cesar Results Last 24 Hours: 


 Microbiology











 03/06/19 18:06 Helicobacter pylori Antigen - Final





 Stool / Feces    NEGATIVE H. PYLORI AG











Med Orders - Current: 


 Current Medications





Al Hydroxide/Mg Hydroxide (Mag-Al Plus)  30 ml PO Q4H PRN


   PRN Reason: Heartburn


   Last Admin: 03/06/19 02:47 Dose:  30 ml


Aspirin (Ecotrin)  325 mg PO DAILY [facility]


   Last Admin: 03/06/19 09:35 Dose:  Not Given


Calcium Carbonate/Glycine (Tums)  500 mg PO Q2H PRN


   PRN Reason: Indigestion


   Last Admin: 03/06/19 08:30 Dose:  500 mg


Enoxaparin Sodium (Lovenox)  40 mg SUBCUT Q24H [facility]


   Last Admin: 03/06/19 20:20 Dose:  Not Given


Fluoxetine HCl (Prozac)  20 mg PO DAILY [facility]


   Last Admin: 03/06/19 10:21 Dose:  20 mg


Folic Acid (Folic Acid)  1 mg PO DAILY [facility]


Hydralazine HCl (Apresoline)  10 mg IVPUSH Q8H PRN


   PRN Reason: Hypertension


Lactated Ringer's (Ringers, Lactated)  1,000 mls @ 100 mls/hr IV ASDIRECTED [facility]


   Last Admin: 03/06/19 22:33 Dose:  100 mls/hr


Lorazepam (Ativan)  0.5 mg PO BID [facility]


   Last Admin: 03/06/19 20:16 Dose:  0.5 mg


Lorazepam (Ativan)  1 - 3 mg IVPUSH Q2H PRN; Protocol


   PRN Reason: withdrawl


Miscellaneous Information (Remove Patch)  0 ea TRDERM ONETIME ONE


   Stop: 03/09/19 08:01


Miscellaneous Information (Remove Patch)  1 ea TRDERM Q24H [facility]


Multivitamins (Thera)  1 each PO DAILY [facility]


Nicotine (Habitrol)  14 mg TRDERM Q24H [facility]


   Last Admin: 03/06/19 12:02 Dose:  Not Given


Ondansetron HCl (Zofran)  4 mg IVPUSH Q4H PRN


   PRN Reason: Nausea


   Last Admin: 03/06/19 01:34 Dose:  4 mg


Pantoprazole Sodium (Protonix***)  40 mg PO BIDAC [facility]


   Last Admin: 03/06/19 15:59 Dose:  40 mg


Sodium Chloride (Saline Flush)  10 ml FLUSH ASDIRECTED PRN


   PRN Reason: Keep Vein Open


   Last Admin: 03/04/19 16:28 Dose:  10 ml


Sucralfate (Carafate)  1 gm PO TIDAC [facility]


   Last Admin: 03/06/19 16:00 Dose:  1 gm


Thiamine HCl (Vitamin B-1)  100 mg IV DAILY [facility]





Discontinued Medications





Aspirin (Aspirin)  324 mg PO ONETIME ONE


   Stop: 03/05/19 08:01


   Last Admin: 03/05/19 09:24 Dose:  324 mg


Bisacodyl (Dulcolax)  10 mg RECTAL ONETIME ONE


   Stop: 03/05/19 06:31


   Last Admin: 03/05/19 09:23 Dose:  Not Given


Fentanyl (Sublimaze) Confirm Administered Dose 100 mcg .ROUTE .STK-MED ONE


   Stop: 03/07/19 07:22


Furosemide (Lasix)  20 mg IVPUSH ONETIME ONE


   Stop: 03/06/19 09:01


   Last Admin: 03/06/19 08:40 Dose:  20 mg


Gadobenate Dimeglumine (Multihance)  12 ml IVPUSH ONETIME ONE


   Stop: 03/05/19 08:02


   Last Admin: 03/05/19 08:30 Dose:  12 ml


Potassium Chloride/Sodium Chloride (Normal Saline With 20 Meq Kcl)  1,000 mls @ 

150 mls/hr IV ASDIRECTED [facility]


   Last Admin: 03/04/19 18:03 Dose:  150 mls/hr


Sodium Chloride (Normal Saline)  1,000 mls @ 999 mls/hr IV ONETIME ONE


   Stop: 03/04/19 21:20


   Last Admin: 03/04/19 21:25 Dose:  Not Given


Magnesium Sulfate 2 gm/ Premix  50 mls @ 25 mls/hr IV ONETIME ONE


   Stop: 03/04/19 22:23


   Last Admin: 03/04/19 21:12 Dose:  25 mls/hr


Lactated Ringer's (Ringers, Lactated)  1,000 mls @ 999 mls/hr IV ONETIME ONE


   Stop: 03/04/19 21:37


   Last Admin: 03/04/19 21:12 Dose:  999 mls/hr


Lactated Ringer's (Ringers, Lactated)  1,000 mls @ 125 mls/hr IV ASDIRECTED [facility]


   Stop: 03/05/19 06:00


   Last Admin: 03/05/19 07:12 Dose:  125 mls/hr


Lactated Ringer's (Ringers, Lactated)  1,000 mls @ 999 mls/hr IV .BOLUS ONE


   Stop: 03/05/19 05:34


   Last Admin: 03/05/19 04:35 Dose:  999 mls/hr


Iopamidol (Isovue-370 (76%))  100 ml IVPUSH ONETIME ONE


   Stop: 03/06/19 11:19


   Last Admin: 03/06/19 11:21 Dose:  100 ml


Metoclopramide HCl (Reglan)  5 mg IVPUSH Q6H PRN


   PRN Reason: Nausea


   Last Admin: 03/06/19 05:07 Dose:  5 mg


Pantoprazole Sodium (Protonix Iv***)  40 mg IVPUSH ONETIME ONE


   Stop: 03/06/19 11:01


   Last Admin: 03/06/19 12:32 Dose:  40 mg


Potassium Chloride (Klor-Con M20)  40 meq PO BID [facility]


   Stop: 03/06/19 09:01


   Last Admin: 03/05/19 20:12 Dose:  40 meq


Propofol (Diprivan  20 Ml) Confirm Administered Dose 200 mg .ROUTE .STK-MED ONE


   Stop: 03/07/19 07:22


Scopolamine (Transderm-Scop)  1.5 mg TRDERM Q72H PRN


   PRN Reason: Nausea/Vomiting


Scopolamine (Transderm-Scop)  1.5 mg TOP ONETIME ONE


   Stop: 03/06/19 08:01


   Last Admin: 03/06/19 08:38 Dose:  1.5 mg


Sodium Chloride (Saline Flush)  10 ml FLUSH ONETIME PRN


   PRN Reason: Keep Vein Open


   Stop: 03/05/19 12:00


   Last Admin: 03/06/19 11:21 Dose:  10 ml


Sodium Chloride (Saline Flush)  10 ml FLUSH ONETIME ONE


   Stop: 03/06/19 11:19


   Last Admin: 03/06/19 12:32 Dose:  Not Given











- Problem List & Annotations


(1) Nausea & vomiting


SNOMED Code(s): 65793942


   Code(s): R11.2 - NAUSEA WITH VOMITING, UNSPECIFIED   Status: Acute   Priority

: High   Current Visit: Yes   


Qualifiers: 


   Vomiting type: unspecified   Vomiting Intractability: unspecified   

Qualified Code(s): R11.2 - Nausea with vomiting, unspecified   





(2) Cerebrovascular accident (CVA)


SNOMED Code(s): 064535626


   Code(s): I63.9 - CEREBRAL INFARCTION, UNSPECIFIED   Status: Acute   Priority

: High   Current Visit: Yes   


Qualifiers: 


   CVA mechanism: unspecified   Qualified Code(s): I63.9 - Cerebral infarction, 

unspecified   





(3) Hypokalemia


SNOMED Code(s): 17674438


   Code(s): E87.6 - HYPOKALEMIA   Status: Acute   Priority: High   Current Visit

: Yes   





(4) Laceration of right upper arm


SNOMED Code(s): 767851612


   Code(s): S41.111A - LACERATION W/O FOREIGN BODY OF RIGHT UPPER ARM, INIT 

ENCNTR   Status: Acute   Priority: High   Current Visit: Yes   


Qualifiers: 


   Encounter type: initial encounter   Qualified Code(s): S41.111A - Laceration 

without foreign body of right upper arm, initial encounter   





(5) Prostate cancer


SNOMED Code(s): 364890702


   Code(s): C61 - MALIGNANT NEOPLASM OF PROSTATE   Status: Acute   Priority: 

High   Current Visit: Yes   





(6) Suicidal ideation


SNOMED Code(s): 2961144


   Code(s): R45.851 - SUICIDAL IDEATIONS   Status: Acute   Priority: High   

Current Visit: Yes   





(7) Tobacco dependence


SNOMED Code(s): 09828662


   Code(s): F17.200 - NICOTINE DEPENDENCE, UNSPECIFIED, UNCOMPLICATED   Status: 

Acute   Priority: High   Current Visit: Yes   





- My Orders


Last 24 Hours: 


My Active Orders





03/06/19 08:02


Calcium Carbonate [Tums]   500 mg PO Q2H PRN 





03/06/19 11:00


Sucralfate [Carafate]   1 gm PO TIDAC 





03/06/19 11:02


Consult for Substance Abuse [CONS] Routine 





03/06/19 11:04


CIWAA Assessment [RC] Q2H 


LORazepam [Ativan]   1 - 3 mg IVPUSH Q2H PRN 





03/06/19 11:15


Lactated Ringers [Ringers, Lactated] 1,000 ml IV ASDIRECTED 





03/06/19 12:35


Consult to Physician [CONS] Routine 





03/06/19 12:36


Notify Provider Consults [RC] ASDIRECTED 





03/06/19 16:00


Pantoprazole [ProTONIX***]   40 mg PO BIDAC 





03/06/19 Dinner


NPO After Midnight [Nothing per Oral After Midnight Diet] [DIET] 





03/07/19 07:04


OCCULT BLOOD DIAGNOSTIC [OP] Routine 





03/07/19 07:22


Antiembolic Devices [RC] PER UNIT ROUTINE 


SCD [Sequential Compression Device] [OM.PC] Routine 





03/07/19 07:50


PACKED CELLS [RED BLOOD CELLS LP] [BBK] Routine 


TYPE AND SCREEN [BBK] Routine 





03/07/19 07:51


FE, TIBC, TRANSFERRIN, FE SAT [CHEM] Routine 





03/07/19 07:52


Transfuse Red Blood Cells [COMM] Routine 





03/07/19 09:00


Folic Acid   1 mg PO DAILY 


Multivitamins,Therapeutic [Thera]   1 each PO DAILY 


Thiamine [Vitamin B-1]   100 mg IV DAILY 





03/09/19 08:00


Remove Patch   0 ea TRDERM ONETIME ONE 














- Plan


Plan:: 


Impression:





Suicidal ideation, history of end stage prostate cancer per patient report


Scheduled for PET scan this week;  CTX, Degarelix





S/P bilateral extremity lacerations 2/2 suicide attempt





CVA, subacute with predominately right sided deficits;  liberal BP mgt.





Dehydration -> resolved 





Hypokalemia -> resolved 





Elevated WBCs, query reactive cf infectious





Elevated Tn (demand ischemia cf ACS); follow up decreasing Tn;  continue ASA; 

Trending down





Imaging, 3-5-19 (carotid, 2D echo, MRI);  MRI small posterior parietal CVA





Nausea and vomiting





Questionable duodenal mass on Abdominal/Pelvis CT - Dr. Rosenthal Consulted 











Chronic


HTN


HLD








Plan:





IVF


1:1


CVA protocol


MRI re; mets and CVA.


Bedside swallow evaluation ->passed


Ischemic work up;  ECG generalized ST depression, Sinus tach


Lipid panel


EMR from oncologist


Psychiatric consult;  re: depression with suicidal thoughts/gesture


Nicotine replacement


Consult Dr. Awad for EGD on 3/7/19


NPO after midnight 


CT abdomen/Pelvis


Full code status


DVT prophylaxis

## 2019-03-07 NOTE — PCM.OPNOTE
- General Post-Op/Procedure Note


Date of Surgery/Procedure: 03/07/19


Operative Procedure(s): egd with bx


Pre Op Diagnosis: anemia blood loss


Post-Op Diagnosis: Same


Anesthesia Technique: MAC


Primary Surgeon: Cm Awad


EBL in mLs: 0


Complications: None


Condition: Good


Free Text/Narrative:: 


 Intake & Output











 03/06/19 03/07/19 03/07/19





 23:59 07:59 15:59


 


Intake Total 735 2213 0


 


Output Total 1100  


 


Balance -365 2213 0

## 2019-03-07 NOTE — CONS
CONSULTING PHYSICIAN:  Cm Awad MD

DATE OF CONSULTATION:  03/06/2019

 

HISTORY OF PRESENT ILLNESS:

The patient is a 78-year-old male, who came into the emergency room suffering a

right-sided stroke on the 4th.  The patient was also complaining of nausea and

vomiting and cutting himself because of suicidal ideation.  He does have

prostatic CA with PSA.  His nausea and vomiting have persisted here and this has

led to a CT scan, which has shown a filling defect in the duodenum and endoscopy

was advised.

 

PAST MEDICAL HISTORY:

Hypertension and prostatic cancer.

 

PAST SURGICAL HISTORY:

Placement of seeds for prostate cancer.

 

SOCIAL HISTORY:

He is an everyday smoker.

 

REVIEW OF SYSTEMS:

No chest pain, shortness of breath, cough, hoarseness, wheezing, fainting,

weakness, numbness, or convulsions.  Does have some nausea and vomiting.

 

PHYSICAL EXAMINATION:

EXTREMITIES:  Upper and lower extremities; no angulation deformities except for

the right arm suffering some paralysis.  Right leg is okay.

NEUROLOGIC:  Mentation is good.  He is oriented.

SKIN:  Warm and dry.

PSYCHIATRIC:  Suicide ideations.

 

MEDICATIONS:

Per medication reconciliation form.

 

ASSESSMENT:

Nausea and vomiting with suspected foreign body defect in the duodenum.

 

PLAN:

For EGD.  Discussed this with the patient, risks and complications.  He

understands and consents.

 

DD:  03/06/2019 14:25:35

DT:  03/06/2019 15:18:33  FRANCA

Job #:  [id number]

## 2019-03-08 NOTE — OR
DATE OF OPERATION:  03/07/2019

 

SURGEON:  Cm Awad MD

 

PREOPERATIVE DIAGNOSIS:

Anemia and abnormal CT scan showing a defect in the duodenum.

 

POSTOPERATIVE DIAGNOSIS:

Anemia and abnormal CT scan showing a defect in the duodenum.

 

OPERATION PERFORMED:  EGD with biopsy.

 

FINDINGS:

At the posterior portion of the duodenal bulb, was a villous growth with

ulcerations.  No visible vessel was noted.  There was some oozing noted from the

fronds.  The second portion of the duodenum was free of any disease and as was

the pylorus, antrum, body, and cardia of the stomach, a hiatal hernia was noted.

GE junctions were located about 35 cm with erosions at the GE junction

encompassing the entire circumference of the esophagus at the GE junction and

extending up the entire length of the esophagus.  Biopsies were taken of the

ulcer and tumor mass in the duodenal bulb and the GE junction in the antrum.

 

ANESTHESIA:

Procedure was done under IV sedation.

 

DESCRIPTION OF PROCEDURE:

The patient was taken to the endoscopy room, placed in a supine position,

connected to monitoring equipment, given IV sedation, bite block was inserted.

Video Olympus gastroscope was placed in the posterior oropharynx, under direct

vision threaded past the cricopharyngeus down the esophagus into the stomach.

Stomach was insufflated and the scope passed through the pylorus to the second

portion of the duodenum.  Second portion of the duodenum was normal, but on the

posterior portion of the duodenal bulb was a frond-like development of the

mucosa with ulcerations of the mucosa.  The fronds oozed a little blood.

Biopsies were taken of this area.  No visible vessel was seen.  The rest of the

duodenal bulb was unremarkable as was the pylorus.  Antrum was normal, this was

biopsied looking for H. pylori.  J-maneuver showed a large hiatal hernia.  The

scope was withdrawn and the cardia and fundus was unremarkable.  Scope withdrawn

at the GE junction, which showed marked ulceration circumferentially and this

ulcerations extended the entire surface of the esophagus and biopsies were taken

at the GE junction.  The patient tolerated the procedure.  Specimen was sent to

Pathology in labeled container and the patient will be returned to the floor.

 

ESTIMATED BLOOD LOSS:

 

 

DD:  03/07/2019 10:31:38

DT:  03/07/2019 16:56:11  MMODAL

Job #:  [id number]

## 2020-05-11 NOTE — EDM.PDOC
ED HPI GENERAL MEDICAL PROBLEM





- General


Chief Complaint: Lower Extremity Injury/Pain


Stated Complaint: [facility]


Time Seen by Provider: 05/11/20 09:12


Source of Information: Reports: Patient, EMS


History Limitations: Reports: No Limitations





- History of Present Illness


INITIAL COMMENTS - FREE TEXT/NARRATIVE: 





79-year-old male presents to the ED per [facility] from his own home.  

She reports that he was lying on the couch this morning and went to get up 

about an hour ago and got dizzy and fell to the floor in his living room.  He 

does not think that he struck any objects.  He landed hard on his left side.  

Denies hitting his head or losing consciousness.  He had immediate pain in his 

left hip and was unable to get up from the floor.  Patient has received 50 mcg 

of fentanyl IV by the paramedic staff.  He denies having any fever.  His 

appetite is poor and he continues to lose weight.  Patient has had a diagnosis 

of prostate cancer for greater than 10 years.  Recently changed from Lupron 

injections in the abdominal wall. Started on new medication Firmagon 80units s/

c abdominal wall once a month due to elevating PSA.  Imaging.  He has not eaten 

anything yet today.  He has not taken any of his home medications.  Does have a 

mild nonproductive cough.


Onset: Today


Onset Date: 05/11/20


Onset Time: 08:40


Duration: Minutes:


Location: Reports: Lower Extremity, Left (Pain left hip)


Quality: Reports: Ache


Severity: Moderate (5 out of 10)


Improves with: Reports: Rest


Worsens with: Reports: Movement


Context: Reports: Trauma (At home.), Other.  Denies: Activity, Exercise, Lifting

, Sick Contact


Associated Symptoms: Reports: Cough, Loss of Appetite, Malaise, Shortness of 

Breath (On exertion), Weakness.  Denies: Confusion, Chest Pain, cough w sputum (

Nonproductive), Diaphoresis, Fever/Chills, Headaches, Nausea/Vomiting, Rash, 

Seizure, Syncope


Treatments PTA: Reports: Other (see below) (Paramedics gave him fentanyl 50 mcg 

IV in route to the hospital.)


  ** Left Hip


Pain Score (Numeric/FACES): 8





- Related Data


 Allergies











Allergy/AdvReac Type Severity Reaction Status Date / Time


 


No Known Allergies Allergy   Verified 05/11/20 09:19











Home Meds: 


 Home Meds





Degarelix [Firmagon] 80 mg SQ ASDIRECTED 03/04/19 [History]


Losartan/Hydrochlorothiazide [Hyzaar 100-12.5 Tablet] 1 each PO DAILY 03/04/19 [

History]


Ascorbate Calcium [Vitamin C] 500 mg PO DAILY 05/11/20 [History]


Calcium Carbonate 600 mg PO DAILY 05/11/20 [History]


Clopidogrel [Plavix] 75 mg PO DAILY 05/11/20 [History]


Enzalutamide [Xtandi] 40 mg PO DAILY 05/11/20 [History]


FLUoxetine [PROzac] 40 mg PO DAILY 05/11/20 [History]


Megestrol [Megace 40 MG/ML Susp] 800 mg PO DAILY 05/11/20 [History]


Multivitamin [Multivitamins] 1 tab PO DAILY 05/11/20 [History]


Pantoprazole Sodium [Protonix] 40 mg PO DAILY 05/11/20 [History]


Vitamin E 400 units PO DAILY 05/11/20 [History]


amLODIPine [Norvasc] 10 mg PO DAILY 05/11/20 [History]


atorvaSTATin Calcium [Lipitor] 40 mg pe PO DAILY 05/11/20 [History]











Past Medical History


HEENT History: Reports: Cataract, Impaired Vision


Cardiovascular History: Reports: Hypertension


Gastrointestinal History: Reports: GERD


Other Genitourinary History: Prostate Cancer


Musculoskeletal History: Reports: Arthritis


Neurological History: Reports: Other (See Below) (had a stroke a week ago 

weekness on right side)


Psychiatric History: Reports: Addiction, Suicide Attempt


Other Psychiatric History: Suicide attempt 3/1/19-3/2/19


Endocrine/Metabolic History: Reports: Osteopenia


Oncologic (Cancer) History: Reports: Prostate (He did with cesium seed implant 

and subsequently Lupron injections every 3 months which was recently changed to 

firmagon 88 and it subcutaneously abdominal wall once monthly to gradually 

elevating PSA)





- Infectious Disease History


Infectious Disease History: Reports: Chicken Pox





- Past Surgical History


Male  Surgical History: Reports: Other (See Below)


Other Male  Surgeries/Procedures: patient has seeds placed for prostrate 

cancer.





Social & Family History





- Family History


HEENT: Reports: Impaired Vision


Cardiac: Reports: None


Respiratory: Reports: None


Musculoskeletal: Reports: Arthritis


Neurological: Reports: Alzheimers Disease, Cerebral Aneurysms


Endocrine/Metabolic: Reports: None


Oncologic: Reports: Breast, Prostate





- Caffeine Use


Caffeine Use: Reports: Coffee, Soda


Other Caffeine Use: Daily





- Living Situation & Occupation


Living situation: Reports: Single


Occupation: Retired





Review of Systems





- Review of Systems


Review Of Systems: See Below


Constitutional: Reports: Weakness, Other (Gradually losing weight for the last 

several months.).  Denies: Chills, Diaphoresis, Fever


Eyes: Reports: No Symptoms


Ears: Reports: No Symptoms


Nose: Reports: No Symptoms


Mouth/Throat: Reports: Other


Respiratory: Reports: Shortness of Breath (Dry mouth), Cough (Nonproductive 

cough).  Denies: Wheezing, Pleuritic Chest Pain


Cardiovascular: Reports: No Symptoms, Other (Hypertension).  Denies: Chest Pain

, Edema


GI/Abdominal: Reports: Other (Occasional problems with constipation)


Genitourinary: Reports: Other (1 prostate cancer with slow urinary stream)


Musculoskeletal: Reports: Back Pain, Joint Pain (Low back pain facial pain 

knees hips and neck), Other (Currently left hip pain from fall this morning)


Skin: Reports: No Symptoms


Neurological: Reports: Dizziness, Weakness.  Denies: Headache (Occasionally 

gets dizzy with standing.  I orthostatic hypotension), Paresthesia, Pre-

Existing Deficit, Seizure, Syncope, Tremors, Trouble Speaking, Difficulty 

Walking


Psychiatric: Reports: No Symptoms





ED EXAM, GENERAL





- Physical Exam


Exam: See Below


Exam Limited By: No Limitations


General Appearance: Alert, WD/WN, Mild Distress, Thin (Most cachectic in 

appearance), Other (In mild pain.  Vital signs show temperature 37.1 with a 

heart rate of 98 respiratory 16 /67.  O2 sats 99% on room air.)


Eye Exam: Bilateral Eye: Normal Inspection (Mild blepharal pallor bilaterally.)

, PERRL


Throat/Mouth: Other (Often tongue are quite dry when tongue is coated shriveled.

)


Head: Atraumatic, Normocephalic, Other.  No: Facial Swelling, Facial Tenderness 

(No outward signs of any head or facial trauma)


Neck: Full Range of Motion, Limited Range of Motion (Crepitus on lateral 

rotation.), Tender Lateral (Tenderness bilateral cervical spine which he states 

is chronic for him)


Respiratory/Chest: No Respiratory Distress, Lungs Clear, Normal Breath Sounds, 

No Accessory Muscle Use, Other (Cachectic appearance with all ribs easily 

visible.  Pain on firm compression of sternum and lateral ribs on compression.)


Cardiovascular: Normal Peripheral Pulses, Regular Rate, Rhythm, No Edema, No 

Gallop, No Murmur, No Rub


Peripheral Pulses: 2+: Carotid (L), Carotid (R), Posterior Tibial (L), 

Posterior Tibial (R), Dorsalis Pedis (L), Dorsalis Pedis (R)


GI/Abdominal: Normal Bowel Sounds, Soft, Non-Tender, No Organomegaly, Pelvis 

Stable, Other (Has a palpable mass right lower quadrant of the abdomen with 

slight overlying ecchymoses.  He states this is where he got his last Firmagon 

shot about 10 days ago.)


 (Male) Exam: No Hernia


Back Exam: Normal Inspection, Decreased Range of Motion, Vertebral Tenderness (

Pain in his lumbar spine bilaterally.  This is on palpation only).  No: CVA 

Tenderness (L), CVA Tenderness (R)


Extremities: Other (Patient is able to lift his right leg off the gurney and 

has reduced internal and external rotation.  He is unable to lift his left leg 

off the gurney at all due to pain in the left hip.  Palpation of the left hip 

is painful but there is no obvious swelling or obvious deformity.  The foot is 

not currently rotated.  No injuries to the either upper extremity appreciated.)


Neurological: Alert, Oriented, CN II-XII Intact, Normal Cognition


Psychiatric: Anxious, Other (Mildly anxious.)


Skin Exam: Warm (  Moderate pain), Dry, Intact, Normal Color, No Rash, Pallor (

Mild pallor)





EKG INTERPRETATION


EKG Date: 05/11/20


Time: 09:37


Rhythm: Other


Rate (Beats/Min): 86


Axis: LAD-Left Axis Deviation (-66 degrees.  Left anterior fascicular block 

pattern.)


P-Wave: Present


QRS: Other (U waves present leads V1 and V2 consider old anteroseptal 

myocardial infarction)


ST-T: Normal


QT: Prolonged (Mildly prolonged)


EKG Interpretation Comments: 





Abnormal ECG





Course





- Vital Signs


Last Recorded V/S: 


 Last Vital Signs











Temp  37.1 C   05/11/20 09:09


 


Pulse  90   05/11/20 09:09


 


Resp  16   05/11/20 09:09


 


BP  150/67 H  05/11/20 09:09


 


Pulse Ox  99   05/11/20 09:09














- Orders/Labs/Meds


Orders: 


 Active Orders 24 hr











 Category Date Time Status


 


 EKG Documentation Completion [RC] STAT Care  05/11/20 09:12 Active


 


 EKG Documentation Completion [RC] STAT Care  05/11/20 09:26 Active


 


 CORONAVIRUS COVID-19 YURI [MOLEC] Stat Lab  05/11/20 10:05 Received


 


 Dextrose 5%-0.9% NaCl [Dextrose 5%-Normal Saline] 1,000 Med  05/11/20 09:30 

Active





 ml   





 IV ASDIRECTED   


 


 Magnesium Sulfate/Water [Magnesium Sulfate in Water Med  05/11/20 10:00 Active





 Premix] 4 gm   





 Premix Bag 1 bag   





 IV ONETIME   








 Medication Orders





Dextrose/Sodium Chloride (Dextrose 5%-Normal Saline)  1,000 mls @ 150 mls/hr IV 

ASDIRECTED NICOLETTE


   Last Admin: 05/11/20 09:48  Dose: 150 mls/hr


Magnesium Sulfate 4 gm/ Premix  50 mls @ 12.5 mls/hr IV ONETIME ONE


   Stop: 05/11/20 13:59


   Last Admin: 05/11/20 10:17  Dose: 12.5 mls/hr








Labs: 


 Laboratory Tests











  05/11/20 05/11/20 05/11/20 Range/Units





  09:20 09:20 09:20 


 


WBC  13.02 H    (4.23-9.07)  K/mm3


 


RBC  3.69 L    (4.63-6.08)  M/mm3


 


Hgb  10.8 L D    (13.7-17.5)  gm/dl


 


Hct  32.2 L    (40.1-51.0)  %


 


MCV  87.3  D    (79.0-92.2)  fl


 


MCH  29.3    (25.7-32.2)  pg


 


MCHC  33.5    (32.2-35.5)  g/dl


 


RDW Std Deviation  41.6    (35.1-43.9)  fL


 


Plt Count  321  D    (163-337)  K/mm3


 


MPV  10.3    (9.4-12.3)  fl


 


Neut % (Auto)  76.5 H    (34.0-67.9)  %


 


Lymph % (Auto)  15.1 L    (21.8-53.1)  %


 


Mono % (Auto)  7.5    (5.3-12.2)  %


 


Eos % (Auto)  0.5 L    (0.8-7.0)  


 


Baso % (Auto)  0.2    (0.1-1.2)  %


 


Neut # (Auto)  9.97 H    (1.78-5.38)  K/mm3


 


Lymph # (Auto)  1.96    (1.32-3.57)  K/mm3


 


Mono # (Auto)  0.97 H    (0.30-0.82)  K/mm3


 


Eos # (Auto)  0.07    (0.04-0.54)  K/mm3


 


Baso # (Auto)  0.02    (0.01-0.08)  K/mm3


 


Manual Slide Review  Normal smear    


 


ESR     (0-15)  mm/hr


 


PT   10.6   (9.7-12.0)  SECONDS


 


INR   0.97   


 


APTT   26   (22-31)  SECONDS


 


Sodium    138  (136-145)  mEq/L


 


Potassium    3.6  (3.5-5.1)  mEq/L


 


Chloride    104  ()  mEq/L


 


Carbon Dioxide    22  (21-32)  mEq/L


 


Anion Gap    15.6 H  (5-15)  


 


BUN    25 H  (7-18)  mg/dL


 


Creatinine    1.2  (0.7-1.3)  mg/dL


 


Est Cr Clr Drug Dosing    36.46  mL/min


 


Estimated GFR (MDRD)    59  (>60)  mL/min


 


BUN/Creatinine Ratio    20.8 H  (14-18)  


 


Glucose    110  ()  mg/dL


 


Calcium    9.0  (8.5-10.1)  mg/dL


 


Magnesium    1.4 L  (1.8-2.4)  mg/dl


 


Total Bilirubin    0.4  (0.2-1.0)  mg/dL


 


AST    18  (15-37)  U/L


 


ALT    16  (16-63)  U/L


 


Alkaline Phosphatase    65  ()  U/L


 


Troponin I    < 0.017  (0.00-0.056)  ng/mL


 


C-Reactive Protein    1.1 H*  (<1.0)  mg/dL


 


NT-Pro-B Natriuret Pep     (0-450)  pg/mL


 


Total Protein    6.4  (6.4-8.2)  g/dl


 


Albumin    3.2 L  (3.4-5.0)  g/dl


 


Globulin    3.2  gm/dL


 


Albumin/Globulin Ratio    1.0  (1-2)  














  05/11/20 05/11/20 Range/Units





  09:20 09:20 


 


WBC    (4.23-9.07)  K/mm3


 


RBC    (4.63-6.08)  M/mm3


 


Hgb    (13.7-17.5)  gm/dl


 


Hct    (40.1-51.0)  %


 


MCV    (79.0-92.2)  fl


 


MCH    (25.7-32.2)  pg


 


MCHC    (32.2-35.5)  g/dl


 


RDW Std Deviation    (35.1-43.9)  fL


 


Plt Count    (163-337)  K/mm3


 


MPV    (9.4-12.3)  fl


 


Neut % (Auto)    (34.0-67.9)  %


 


Lymph % (Auto)    (21.8-53.1)  %


 


Mono % (Auto)    (5.3-12.2)  %


 


Eos % (Auto)    (0.8-7.0)  


 


Baso % (Auto)    (0.1-1.2)  %


 


Neut # (Auto)    (1.78-5.38)  K/mm3


 


Lymph # (Auto)    (1.32-3.57)  K/mm3


 


Mono # (Auto)    (0.30-0.82)  K/mm3


 


Eos # (Auto)    (0.04-0.54)  K/mm3


 


Baso # (Auto)    (0.01-0.08)  K/mm3


 


Manual Slide Review    


 


ESR  45 H   (0-15)  mm/hr


 


PT    (9.7-12.0)  SECONDS


 


INR    


 


APTT    (22-31)  SECONDS


 


Sodium    (136-145)  mEq/L


 


Potassium    (3.5-5.1)  mEq/L


 


Chloride    ()  mEq/L


 


Carbon Dioxide    (21-32)  mEq/L


 


Anion Gap    (5-15)  


 


BUN    (7-18)  mg/dL


 


Creatinine    (0.7-1.3)  mg/dL


 


Est Cr Clr Drug Dosing    mL/min


 


Estimated GFR (MDRD)    (>60)  mL/min


 


BUN/Creatinine Ratio    (14-18)  


 


Glucose    ()  mg/dL


 


Calcium    (8.5-10.1)  mg/dL


 


Magnesium    (1.8-2.4)  mg/dl


 


Total Bilirubin    (0.2-1.0)  mg/dL


 


AST    (15-37)  U/L


 


ALT    (16-63)  U/L


 


Alkaline Phosphatase    ()  U/L


 


Troponin I    (0.00-0.056)  ng/mL


 


C-Reactive Protein    (<1.0)  mg/dL


 


NT-Pro-B Natriuret Pep   787 H  (0-450)  pg/mL


 


Total Protein    (6.4-8.2)  g/dl


 


Albumin    (3.4-5.0)  g/dl


 


Globulin    gm/dL


 


Albumin/Globulin Ratio    (1-2)  











Meds: 


Medications











Generic Name Dose Route Start Last Admin





  Trade Name Freq  PRN Reason Stop Dose Admin


 


Dextrose/Sodium Chloride  1,000 mls @ 150 mls/hr  05/11/20 09:30  05/11/20 09:48





  Dextrose 5%-Normal Saline  IV   150 mls/hr





  ASDIRECTED NICOLETTE   Administration





     





     





     





     


 


Magnesium Sulfate 4 gm/ Premix  50 mls @ 12.5 mls/hr  05/11/20 10:00  05/11/20 

10:17





  IV  05/11/20 13:59  12.5 mls/hr





  ONETIME ONE   Administration





     





     





     





     














Discontinued Medications














Generic Name Dose Route Start Last Admin





  Trade Name Freq  PRN Reason Stop Dose Admin


 


Hydromorphone HCl  0.5 mg  05/11/20 09:16  05/11/20 09:44





  Dilaudid  IVPUSH  05/11/20 09:17  0.5 mg





  ONETIME ONE   Administration





     





     





     





     


 


Metoclopramide HCl  5 mg  05/11/20 09:16  05/11/20 09:46





  Reglan  IVPUSH  05/11/20 09:17  5 mg





  ONETIME ONE   Administration





     





     





     





     














- Radiology Interpretation


Free Text/Narrative:: 


79-year-old male presents to the ED per [facility] after falling at 

home this morning.  He states he was lying on the couch and went to get up and 

became quite dizzy likely due to orthostatic hypotension and fell to the floor 

landing hard on his left hip.  Denies hitting his head and he has full 

recollection of the events.  States it took him over an hour to be able to 

obtain help.  He lives alone.  He is unable to weight-bear on his left hip.  

Examination suggest fracture left hip.  No other injuries are evident.  Patient 

has a history of prostate cancer for many years.  Currently being treated with 

Firmagon subcutaneous injections once monthly in the abdominal wall recently 

replacing Lupron shots which he has been using for many years .  His PSA has 

been gradually elevating.  Therefore the possibility of a pathologic fracture 

exist.  And he will have a portable chest x-ray carried out.  He will have 1 

view of the pelvis and left femur x-ray done.  Routine labs including coags to 

be done as he likely is going to need surgery.  IV will be D5 normal saline at 

150 mils per hour.  And Dilaudid 0.5 mg IV with Reglan 5 mg IV for pain and 

nausea relief.








- Re-Assessments/Exams


Free Text/Narrative Re-Assessment/Exam: 





05/11/20 09:41 chest x-ray done portably reveals stigmata of COPD with a large 

bleb in the left lower lung field.  The lungs are otherwise clear.  Cardiac 

silhouette is within normal limits.  No bony metastatic disease is evident in 

any of the ribs.  X-ray of the pelvis reveals cesium seed implants in his 

prostatic bed.  No fractures in the pelvis are identified.  It does reveal a 

fracture through the left femoral neck of the femur.  This is confirmed again 

on femur x-ray of the left side with no other fractures identified in the femur.





05/11/20 10:00 Labs reveal a mildly elevated white blood cell count at 13.02 

with auto differential showing 76.5% neutrophils.  Hemoglobin is low at 10.8 

with hematocrit of 32.2.  MCV is normal at 87.3.  Platelet count is normal at 

321,000.  PT is 10.6 with an INR of 0.97.  PTT is 26.  Sodium 138 with a 

potassium of 3.6.  Chloride is 104 with a bicarb of 22.  Anion gap is minimally 

elevated at 15.6.  BUN is 25 with a creatinine of 1.2.  GFR is 59.  Glucose is 

110 with a calcium of 9.0.  Magnesium low at 1.4.  Bilirubin is 0.4 liver 

function is otherwise normal.  Troponin I is less than 0.017.  C-reactive 

protein 1.1.  BNP mildly elevated at 787.  Total protein is 6.4 with an albumin 

fraction of 3.2





05/11/20 10:06 spoken through the 1 call nurse at [facility] in 

[place] and the patient be transferred to that facility.  Dr. Rust from the 

department of orthopedics has accepted care. A Covid- 19 test was ordered on 

this patient as he will be going to the operating room.  Patient will be 

transferred to [facility] per ground ambulance.  Patient 

reports pain is controlled at this point time





05/11/20 11:05 Covid -19 testing is negative.





Departure





- Departure


Time of Disposition: 11:06


Disposition: DC/Tfer to Acute Hospital 02


Condition: Fair


Clinical Impression: 


 Prostate cancer, Fracture of neck of femur, hip, Hypomagnesemia








- Discharge Information


*PRESCRIPTION DRUG MONITORING PROGRAM REVIEWED*: Not Applicable


*COPY OF PRESCRIPTION DRUG MONITORING REPORT IN PATIENT JULIA: Not Applicable


Referrals: 


Dominic Brandt MD [Primary Care Provider] - 


Forms:  ED Department Discharge


Additional Instructions: 


Patient will be transferred to [facility] for definitive orthopedic 

surgical management is no orthopedic surgery is available here.  Dr. Rust is 

the accepting physician





Sepsis Event Note





- Focused Exam


Vital Signs: 


 Vital Signs











  Temp Pulse Resp BP Pulse Ox


 


 05/11/20 09:09  37.1 C  90  16  150/67 H  99











Date Exam was Performed: 05/11/20


Time Exam was Performed: 11:05





- My Orders


Last 24 Hours: 


My Active Orders





05/11/20 09:12


EKG Documentation Completion [RC] STAT 





05/11/20 09:26


EKG Documentation Completion [RC] STAT 





05/11/20 09:30


Dextrose 5%-0.9% NaCl [Dextrose 5%-Normal Saline] 1,000 ml IV ASDIRECTED 





05/11/20 10:00


Magnesium Sulfate/Water [Magnesium Sulfate in Water Premix] 4 gm   Premix Bag 1 

bag IV ONETIME 





05/11/20 10:05


CORONAVIRUS COVID-19 YURI [MOLEC] Stat 














- Assessment/Plan


Last 24 Hours: 


My Active Orders





05/11/20 09:12


EKG Documentation Completion [RC] STAT 





05/11/20 09:26


EKG Documentation Completion [RC] STAT 





05/11/20 09:30


Dextrose 5%-0.9% NaCl [Dextrose 5%-Normal Saline] 1,000 ml IV ASDIRECTED 





05/11/20 10:00


Magnesium Sulfate/Water [Magnesium Sulfate in Water Premix] 4 gm   Premix Bag 1 

bag IV ONETIME 





05/11/20 10:05


CORONAVIRUS COVID-19 YURI [MOLEC] Stat

## 2020-05-11 NOTE — CR
Pelvis: AP view of the pelvis was obtained.

 

Comparison: No previous pelvis study.

 

Joint space narrowing is noted within both hips, slightly worse on the

 right side.  Sclerotic line is noted within the subcapital region 

left hip compatible with mildly impacted subcapital fracture.  

Radiation implant seeds are noted within the prostate gland.  Vascular

 calcification is noted.  Disc space narrowing is noted within the 

visualized lower lumbar spine.  Osteopenia is seen.

 

Impression:

1.  Impacted subcapital fracture within the left hip.

2.  Degenerative change as noted above with osteopenia and vascular 

calcification.

 

Diagnostic code #3

 

This report was dictated in MDT

## 2020-05-11 NOTE — CR
Left femur: AP and lateral views of the left femur were obtained.

 

Comparison: No prior femur study.

 

Slightly impacted subcapital fracture within the left hip is noted.  

Radiation implant seeds are noted within the prostate gland.  Moderate

 joint space narrowing is seen medially within the left knee.  Diffuse

 vascular calcification is seen.  Bony structures are osteopenic.  

Mild joint space narrowing is noted within the left hip.

 

Impression:

1.  Mildly impacted subcapital fracture within the left hip.

2.  Other findings as described above which are nonacute.

 

Diagnostic code #3

 

This report was dictated in MDT

## 2020-05-11 NOTE — CR
Chest: Frontal view of the chest was obtained.

 

Comparison: No prior chest imaging is available.

 

Heart size and mediastinum are normal.  Lungs are clear with no acute 

parenchymal change.  Bony structures are grossly intact.

 

Impression:

1.  Nothing acute is seen on frontal chest x-ray.

 

Diagnostic code #1

 

This report was dictated in MDT

## 2020-10-20 NOTE — EDM.PDOC
ED HPI GENERAL MEDICAL PROBLEM





- General


Chief Complaint: Abdominal Pain


Stated Complaint: [facility]


Time Seen by Provider: 10/20/20 08:12


Source of Information: Reports: Patient, Nursing Home Records





- History of Present Illness


INITIAL COMMENTS - FREE TEXT/NARRATIVE: 


80-year-old male presents to the ED from [facility] where he has 

resided for the last 3 or 4 months.  He states he has been having loose diarrhea

stools usually once or twice per day for the last 2 weeks.  He has undergone 

some medication changes recently but he is not sure what.  Sounds like his 

antiandrogenic hormone( xtandi) and perhaps is anti gonadotropin inhibitors.-The

Xtandi can cause diarrhea.  Here he usually reports 2 loose bowel movements 

every morning and then usually better the rest of the day.  He has a history of 

metastatic prostate cancer.  Cancer was diagnosed in 2000 and was treated with 

cesium seed implants.  He is currently on 2 medications for prostate cancer 

control.  He states anytime he eats it goes right through him.  He continues to 

lose weight.  He is also on megestrol to try and stimulate his appetite.  When 

he was aided up this morning he nearly passed out in the bathroom but staff 

prevented him from falling to the floor.  He was very lightheaded and dizzy and 

weak in his lower extremities.  He states he has some mild nausea but no 

vomiting.  Does have a productive sounding cough and occasionally expectorates 

some grayish sputum.  He has known severe COPD  treated with oxygen 

intermittently? .  His O2 sats here between 92 and 94% at rest.





Onset: Unknown/Unsure (He reports loose diarrhea for the last 2 weeks or more.)


Duration: Week(s):, Chronic, Getting Worse


Location: Reports: Generalized (Neurolyse sense of weakness particularly in his 

lower extremities with decreased appetite.)


Quality: Reports: Other


Severity: Moderate (Generalized weakness)


Improves with: Reports: None


Worsens with: Reports: Other


Context: Reports: Other (Known metastatic prostate cancer.).  Denies: Activity, 

Exercise, Lifting, Sick Contact, Trauma


Associated Symptoms: Reports: Cough, cough w sputum, Loss of Appetite, Malaise, 

Nausea/Vomiting, Shortness of Breath, Weakness (Nausea without vomiting 

generalized weakness particular in his lower extremities.).  Denies: Confusion, 

Chest Pain, Diaphoresis (Grayish sputum.), Fever/Chills, Headaches, Rash, 

Seizure, Syncope


Treatments PTA: Reports: Oxygen





- Related Data


                                    Allergies











Allergy/AdvReac Type Severity Reaction Status Date / Time


 


No Known Allergies Allergy   Verified 10/20/20 08:04











Home Meds: 


                                    Home Meds





Losartan/Hydrochlorothiazide [Hyzaar 100-12.5 Tablet] 1 each PO DAILY 03/04/19 

[History]


Ascorbate Calcium [Vitamin C] 500 mg PO DAILY 05/11/20 [History]


Calcium Carbonate 600 mg PO DAILY 05/11/20 [History]


Clopidogrel [Plavix] 75 mg PO DAILY 05/11/20 [History]


FLUoxetine [PROzac] 40 mg PO DAILY 05/11/20 [History]


Megestrol [Megace 40 MG/ML Susp] 800 mg PO DAILY 05/11/20 [History]


Multivitamin [Multivitamins] 1 tab PO DAILY 05/11/20 [History]


Pantoprazole Sodium [Protonix] 40 mg PO DAILY 05/11/20 [History]


Vitamin E 400 units PO DAILY 05/11/20 [History]


atorvaSTATin Calcium [Lipitor] 40 mg pe PO DAILY 05/11/20 [History]


Cholecalciferol (Vitamin D3) [Vitamin D3] 1,000 unit PO DAILY 10/20/20 [History]


Docusate Sodium 100 mg PO ASDIRECTED 10/20/20 [History]


Levothyroxine [Synthroid] 50 mcg PO ACBREAKFAST 10/20/20 [History]


Loperamide HCl [Imodium A-D] 2 mg PO ASDIRECTED 10/20/20 [History]


Magnesium Chloride [Slow-Mag] 71.5 mg PO BID #60 tablet.dr 10/20/20 [Rx]


Menthol [Halls] 1 tab PO ASDIRECTED PRN 10/20/20 [History]


Nicotine [Nicotine Patch] 1 each TD DAILY 10/20/20 [History]


Oxybutynin 5 mg PO BID 10/20/20 [History]


Simethicone 80 mg PO Q6H PRN 10/20/20 [History]


Tamsulosin HCl [Flomax] 0.4 mg PO DAILY 10/20/20 [History]


hydroCHLOROthiazide [Hydrochlorothiazide] 12.5 mg PO DAILY 10/20/20 [History]


levoFLOXacin [Levaquin] 250 mg PO DAILY #8 tab 10/20/20 [Rx]


polyethylene glycoL 3350 [MiraLAX] 17 gm PO ASDIRECTED 10/20/20 [History]











Past Medical History


HEENT History: Reports: Cataract, Impaired Vision


Cardiovascular History: Reports: Hypertension


Gastrointestinal History: Reports: GERD


Other Gastrointestinal History: ulcer


Other Genitourinary History: Prostate Cancer


Musculoskeletal History: Reports: Arthritis


Neurological History: Reports: Other (See Below) (had a stroke a week ago 

weekness on right side)


Other Neuro History: stroke 7 months ago


Psychiatric History: Reports: Addiction, Suicide Attempt


Other Psychiatric History: Suicide attempt 3/1/19-3/2/19


Endocrine/Metabolic History: Reports: Osteopenia


Oncologic (Cancer) History: Reports: Prostate (He did with cesium seed implant 

and subsequently Lupron injections every 3 months which was recently changed to 

firmagon 88 and it subcutaneously abdominal wall once monthly to gradually 

elevating PSA)





- Infectious Disease History


Infectious Disease History: Reports: Chicken Pox





- Past Surgical History


Male  Surgical History: Reports: Other (See Below)


Other Male  Surgeries/Procedures: patient has seeds placed for prostrate 

cancer.





Social & Family History





- Family History


HEENT: Reports: Impaired Vision


Cardiac: Reports: None


Respiratory: Reports: None


Musculoskeletal: Reports: Arthritis


Neurological: Reports: Alzheimers Disease, Cerebral Aneurysms


Endocrine/Metabolic: Reports: None


Oncologic: Reports: Breast, Prostate





- Caffeine Use


Caffeine Use: Reports: Coffee, Soda


Other Caffeine Use: Daily





- Living Situation & Occupation


Living situation: Reports: Single


Occupation: Retired





ED ROS GENERAL





- Review of Systems


Review Of Systems: See Below


Constitutional: Reports: Malaise, Weakness, Fatigue, Decreased Appetite 

(Continues to slowly lose weight.), Weight Loss.  Denies: Fever, Chills


HEENT: Reports: Other


Respiratory: Reports: Shortness of Breath (Dentures), Wheezing, Cough, Sputum.  

Denies: Pleuritic Chest Pain, Hemoptysis, Other (Patient collar)


Cardiovascular: Reports: Dyspnea on Exertion, Lightheadedness.  Denies: Blood 

Pressure Problem, Edema, Orthopnea, Palpitations, PND


Endocrine: Reports: Fatigue


GI/Abdominal: Reports: Diarrhea, Decreased Appetite, Nausea.  Denies: Abdominal 

Pain, Black Stool, Bloody Stool, Difficulty Swallowing, Distension, Flatus, 

Hematemesis, Hematochezia, Melena, Mucous in Stool, Stool Incontinence


: Reports: Frequency (Occasional nausea), Other (Nocturia x1.)


Musculoskeletal: Reports: Joint Pain (He sips lower back neck and shoulders.)


Skin: Reports: Bruising (Was easily but he is on Plavix.)


Neurological: Reports: Dizziness, Difficulty Walking, Weakness.  Denies: 

Confusion, Headache, Seizure, Syncope, Trouble Speaking


Psychiatric: Reports: No Symptoms


Hematologic/Lymphatic: Reports: No Symptoms


Immunologic: Reports: No Symptoms





ED EXAM, GI/ABD





- Physical Exam


Exam: See Below


Exam Limited By: No Limitations


General Appearance: Alert, WD/WN, No Apparent Distress, Thin, Other (Mild great 

tone to his skin.  Temperature is 36.9 heart rate was 88 and sinus respiratory 

is 18 with O2 sats of 92 to 94% on room air BP is 116/51.)


Eyes: Bilateral: Normal Appearance (Mild blepharal pallor.  No scleral icterus 

identified)


Throat/Mouth: Other (Lung is dry and coated.)


Head: Atraumatic (  He is wearing lower dentures.), Normocephalic, Other (No 

outward signs of head or neck trauma.)


Neck: Normal Inspection, Limited Range of Motion, Tender Lateral.  No: 

Lymphadenopathy (L), Lymphadenopathy (R) (Dates no worse than normal.)


Respiratory/Chest: Decreased Breath Sounds, Rhonchi (  Scattered expiratory 

wheezes.  Scattered rhonchi upper anterior chest with a productive sounding 

cough at times.), Wheezing (Decreased air entry to the lower 50% of the lungs 

bilaterally.), Other (Ribs are easily visible.).  No: Lungs Clear, Normal Breath

 Sounds, Chest Non-Tender, Respiratory Distress


Cardiovascular: Normal Peripheral Pulses, Regular Rate, Rhythm, No Edema, No 

Gallop, No Murmur, No Rub


GI/Abdominal Exam: Normal Bowel Sounds, Soft, Non-Tender, No Organomegaly, No 

Abnormal Bruit, No Mass, Pelvis Stable, Other (Surgical scars appreciated.)


Back Exam: Vertebral Tenderness (Along the lumbar spine.), Other (Mild kyphosis 

thoracic spine.)


Extremities: Other.  No: Normal Range of Motion (Evidence of osteoarthritic 

change both knees both hips with very limited internal and external rotation of 

either hip.), Pedal Edema


Neurological: Alert, Oriented, CN II-XII Intact, Normal Cognition


Psychiatric: Normal Affect, Normal Mood


Skin Exam: Warm, Dry, Intact, Ecchymosis (Both lateral left proximal leg and 

right medial leg.  Peer to be several days old.), Other (Grayish color to his 

skin.)


  ** #1 Interpretation


EKG Date: 10/20/20


Time: 08:26


Rhythm: NSR


Rate (Beats/Min): 85


Axis: LAD-Left Axis Deviation (-55 degrees.)


P-Wave: Present


QRS: Other (Left anterior fascicular block pattern.  Nonspecific 

intraventricular conduction delay.)


ST-T: Other (T wave flattening in leads I and aVL nonspecific finding)


QT: Prolonged (Mildly prolonged)


EKG Interpretation Comments: 





Abnormal ECG





Course





- Vital Signs


Last Recorded V/S: 


                                Last Vital Signs











Temp  36.9 C   10/20/20 08:07


 


Pulse  88   10/20/20 08:07


 


Resp  18   10/20/20 08:07


 


BP  116/51 L  10/20/20 08:07


 


Pulse Ox  92 L  10/20/20 08:07














- Orders/Labs/Meds


Orders: 


                               Active Orders 24 hr











 Category Date Time Status


 


 EKG Documentation Completion [RC] STAT Care  10/20/20 08:19 Active


 


 Chest 1V Frontal [CR] Stat Exams  10/20/20 08:18 Taken


 


 CULTURE URINE [RM] Routine Lab  10/20/20 09:08 Received


 


 STOOL CULTURE/SHIGA TOXIN [MREF] Stat Lab  10/20/20 08:20 Ordered


 


 WBC, STOOL [OP] Stat Lab  10/20/20 08:20 Ordered


 


 Dextrose 5%-Lactated Ringers 1,000 ml Med  10/20/20 08:30 Active





 IV ASDIRECTED   








                                Medication Orders





Dextrose/Lactated Ringer's (Dextrose 5%-Lactated Ringers)  1,000 mls @ 150 

mls/hr IV ASDIRECTED NICOLETTE


   Last Admin: 10/20/20 08:43  Dose: 150 mls/hr


   Documented by: OCTAVIA








Labs: 


                                Laboratory Tests











  10/20/20 10/20/20 10/20/20 Range/Units





  07:55 07:55 07:55 


 


WBC  12.20 H    (4.23-9.07)  K/mm3


 


RBC  3.52 L    (4.63-6.08)  M/mm3


 


Hgb  9.1 L D    (13.7-17.5)  gm/dl


 


Hct  29.2 L    (40.1-51.0)  %


 


MCV  83.0  D    (79.0-92.2)  fl


 


MCH  25.9    (25.7-32.2)  pg


 


MCHC  31.2 L    (32.2-35.5)  g/dl


 


RDW Std Deviation  45.0 H    (35.1-43.9)  fL


 


Plt Count  300    (163-337)  K/mm3


 


MPV  10.4    (9.4-12.3)  fl


 


Neut % (Auto)  85.1 H    (34.0-67.9)  %


 


Lymph % (Auto)  7.5 L    (21.8-53.1)  %


 


Mono % (Auto)  6.3    (5.3-12.2)  %


 


Eos % (Auto)  0.7 L    (0.8-7.0)  


 


Baso % (Auto)  0.2    (0.1-1.2)  %


 


Neut # (Auto)  10.37 H    (1.78-5.38)  K/mm3


 


Lymph # (Auto)  0.92 L    (1.32-3.57)  K/mm3


 


Mono # (Auto)  0.77    (0.30-0.82)  K/mm3


 


Eos # (Auto)  0.09    (0.04-0.54)  K/mm3


 


Baso # (Auto)  0.02    (0.01-0.08)  K/mm3


 


Manual Slide Review  Abnormal smear    


 


ESR     (0-15)  mm/hr


 


PT   12.1 H   (9.7-11.7)  SECONDS


 


INR   1.13   


 


APTT   30   (22-31)  SECONDS


 


Sodium    137  (136-145)  mEq/L


 


Potassium    3.6  (3.5-5.1)  mEq/L


 


Chloride    102  ()  mEq/L


 


Carbon Dioxide    21  (21-32)  mEq/L


 


Anion Gap    17.6 H  (5-15)  


 


BUN    21 H  (7-18)  mg/dL


 


Creatinine    1.3  (0.7-1.3)  mg/dL


 


Est Cr Clr Drug Dosing    38.72  mL/min


 


Estimated GFR (MDRD)    53  (>60)  mL/min


 


BUN/Creatinine Ratio    16.2  (14-18)  


 


Glucose    124 H  ()  mg/dL


 


Serum Osmolality    290  (280-300)  mosm/kg


 


Calcium    8.7  (8.5-10.1)  mg/dL


 


Magnesium    1.1 L  (1.8-2.4)  mg/dl


 


Total Bilirubin    0.6  (0.2-1.0)  mg/dL


 


AST    16  (15-37)  U/L


 


ALT    19  (16-63)  U/L


 


Alkaline Phosphatase    94  ()  U/L


 


Troponin I    < 0.017  (0.00-0.056)  ng/mL


 


C-Reactive Protein    14.9 H*  (<1.0)  mg/dL


 


NT-Pro-B Natriuret Pep     (0-450)  pg/mL


 


Total Protein    6.7  (6.4-8.2)  g/dl


 


Albumin    2.7 L  (3.4-5.0)  g/dl


 


Globulin    4.0  gm/dL


 


Albumin/Globulin Ratio    0.7 L  (1-2)  


 


Prostate Specific Ag     (0.1-4.0)  ng/mL


 


Urine Color     (Yellow)  


 


Urine Appearance     (Clear)  


 


Urine pH     (5.0-8.0)  


 


Ur Specific Gravity     (1.005-1.030)  


 


Urine Protein     (Negative)  


 


Urine Glucose (UA)     (Negative)  


 


Urine Ketones     (Negative)  


 


Urine Occult Blood     (Negative)  


 


Urine Nitrite     (Negative)  


 


Urine Bilirubin     (Negative)  


 


Urine Urobilinogen     (0.2-1.0)  


 


Ur Leukocyte Esterase     (Negative)  


 


Urine RBC     (0-5)  /hpf


 


Urine WBC     (0-5)  /hpf


 


Ur Squamous Epith Cells     (0-5)  /hpf


 


Amorphous Sediment     (NOT SEEN)  /hpf


 


Urine Bacteria     (FEW)  /hpf


 


Urine Mucus     (FEW)  /hpf


 


Ketones     (0.0-0.3)  mM














  10/20/20 10/20/20 10/20/20 Range/Units





  07:55 07:55 07:55 


 


WBC     (4.23-9.07)  K/mm3


 


RBC     (4.63-6.08)  M/mm3


 


Hgb     (13.7-17.5)  gm/dl


 


Hct     (40.1-51.0)  %


 


MCV     (79.0-92.2)  fl


 


MCH     (25.7-32.2)  pg


 


MCHC     (32.2-35.5)  g/dl


 


RDW Std Deviation     (35.1-43.9)  fL


 


Plt Count     (163-337)  K/mm3


 


MPV     (9.4-12.3)  fl


 


Neut % (Auto)     (34.0-67.9)  %


 


Lymph % (Auto)     (21.8-53.1)  %


 


Mono % (Auto)     (5.3-12.2)  %


 


Eos % (Auto)     (0.8-7.0)  


 


Baso % (Auto)     (0.1-1.2)  %


 


Neut # (Auto)     (1.78-5.38)  K/mm3


 


Lymph # (Auto)     (1.32-3.57)  K/mm3


 


Mono # (Auto)     (0.30-0.82)  K/mm3


 


Eos # (Auto)     (0.04-0.54)  K/mm3


 


Baso # (Auto)     (0.01-0.08)  K/mm3


 


Manual Slide Review     


 


ESR  80 H    (0-15)  mm/hr


 


PT     (9.7-11.7)  SECONDS


 


INR     


 


APTT     (22-31)  SECONDS


 


Sodium     (136-145)  mEq/L


 


Potassium     (3.5-5.1)  mEq/L


 


Chloride     ()  mEq/L


 


Carbon Dioxide     (21-32)  mEq/L


 


Anion Gap     (5-15)  


 


BUN     (7-18)  mg/dL


 


Creatinine     (0.7-1.3)  mg/dL


 


Est Cr Clr Drug Dosing     mL/min


 


Estimated GFR (MDRD)     (>60)  mL/min


 


BUN/Creatinine Ratio     (14-18)  


 


Glucose     ()  mg/dL


 


Serum Osmolality     (280-300)  mosm/kg


 


Calcium     (8.5-10.1)  mg/dL


 


Magnesium     (1.8-2.4)  mg/dl


 


Total Bilirubin     (0.2-1.0)  mg/dL


 


AST     (15-37)  U/L


 


ALT     (16-63)  U/L


 


Alkaline Phosphatase     ()  U/L


 


Troponin I     (0.00-0.056)  ng/mL


 


C-Reactive Protein     (<1.0)  mg/dL


 


NT-Pro-B Natriuret Pep   1058 H   (0-450)  pg/mL


 


Total Protein     (6.4-8.2)  g/dl


 


Albumin     (3.4-5.0)  g/dl


 


Globulin     gm/dL


 


Albumin/Globulin Ratio     (1-2)  


 


Prostate Specific Ag     (0.1-4.0)  ng/mL


 


Urine Color     (Yellow)  


 


Urine Appearance     (Clear)  


 


Urine pH     (5.0-8.0)  


 


Ur Specific Gravity     (1.005-1.030)  


 


Urine Protein     (Negative)  


 


Urine Glucose (UA)     (Negative)  


 


Urine Ketones     (Negative)  


 


Urine Occult Blood     (Negative)  


 


Urine Nitrite     (Negative)  


 


Urine Bilirubin     (Negative)  


 


Urine Urobilinogen     (0.2-1.0)  


 


Ur Leukocyte Esterase     (Negative)  


 


Urine RBC     (0-5)  /hpf


 


Urine WBC     (0-5)  /hpf


 


Ur Squamous Epith Cells     (0-5)  /hpf


 


Amorphous Sediment     (NOT SEEN)  /hpf


 


Urine Bacteria     (FEW)  /hpf


 


Urine Mucus     (FEW)  /hpf


 


Ketones    0.36  (0.0-0.3)  mM














  10/20/20 10/20/20 Range/Units





  07:55 09:08 


 


WBC    (4.23-9.07)  K/mm3


 


RBC    (4.63-6.08)  M/mm3


 


Hgb    (13.7-17.5)  gm/dl


 


Hct    (40.1-51.0)  %


 


MCV    (79.0-92.2)  fl


 


MCH    (25.7-32.2)  pg


 


MCHC    (32.2-35.5)  g/dl


 


RDW Std Deviation    (35.1-43.9)  fL


 


Plt Count    (163-337)  K/mm3


 


MPV    (9.4-12.3)  fl


 


Neut % (Auto)    (34.0-67.9)  %


 


Lymph % (Auto)    (21.8-53.1)  %


 


Mono % (Auto)    (5.3-12.2)  %


 


Eos % (Auto)    (0.8-7.0)  


 


Baso % (Auto)    (0.1-1.2)  %


 


Neut # (Auto)    (1.78-5.38)  K/mm3


 


Lymph # (Auto)    (1.32-3.57)  K/mm3


 


Mono # (Auto)    (0.30-0.82)  K/mm3


 


Eos # (Auto)    (0.04-0.54)  K/mm3


 


Baso # (Auto)    (0.01-0.08)  K/mm3


 


Manual Slide Review    


 


ESR    (0-15)  mm/hr


 


PT    (9.7-11.7)  SECONDS


 


INR    


 


APTT    (22-31)  SECONDS


 


Sodium    (136-145)  mEq/L


 


Potassium    (3.5-5.1)  mEq/L


 


Chloride    ()  mEq/L


 


Carbon Dioxide    (21-32)  mEq/L


 


Anion Gap    (5-15)  


 


BUN    (7-18)  mg/dL


 


Creatinine    (0.7-1.3)  mg/dL


 


Est Cr Clr Drug Dosing    mL/min


 


Estimated GFR (MDRD)    (>60)  mL/min


 


BUN/Creatinine Ratio    (14-18)  


 


Glucose    ()  mg/dL


 


Serum Osmolality    (280-300)  mosm/kg


 


Calcium    (8.5-10.1)  mg/dL


 


Magnesium    (1.8-2.4)  mg/dl


 


Total Bilirubin    (0.2-1.0)  mg/dL


 


AST    (15-37)  U/L


 


ALT    (16-63)  U/L


 


Alkaline Phosphatase    ()  U/L


 


Troponin I    (0.00-0.056)  ng/mL


 


C-Reactive Protein    (<1.0)  mg/dL


 


NT-Pro-B Natriuret Pep    (0-450)  pg/mL


 


Total Protein    (6.4-8.2)  g/dl


 


Albumin    (3.4-5.0)  g/dl


 


Globulin    gm/dL


 


Albumin/Globulin Ratio    (1-2)  


 


Prostate Specific Ag  11.4 H   (0.1-4.0)  ng/mL


 


Urine Color   Yellow  (Yellow)  


 


Urine Appearance   Clear  (Clear)  


 


Urine pH   5.0  (5.0-8.0)  


 


Ur Specific Gravity   1.020  (1.005-1.030)  


 


Urine Protein   Trace H  (Negative)  


 


Urine Glucose (UA)   Negative  (Negative)  


 


Urine Ketones   Negative  (Negative)  


 


Urine Occult Blood   Negative  (Negative)  


 


Urine Nitrite   Negative  (Negative)  


 


Urine Bilirubin   Negative  (Negative)  


 


Urine Urobilinogen   0.2  (0.2-1.0)  


 


Ur Leukocyte Esterase   1+ H  (Negative)  


 


Urine RBC   0-5  (0-5)  /hpf


 


Urine WBC   20-30 H  (0-5)  /hpf


 


Ur Squamous Epith Cells   0-5  (0-5)  /hpf


 


Amorphous Sediment   Rare H  (NOT SEEN)  /hpf


 


Urine Bacteria   Many H  (FEW)  /hpf


 


Urine Mucus   Not seen  (FEW)  /hpf


 


Ketones    (0.0-0.3)  mM











Meds: 


Medications











Generic Name Dose Route Start Last Admin





  Trade Name Freq  PRN Reason Stop Dose Admin


 


Dextrose/Lactated Ringer's  1,000 mls @ 150 mls/hr  10/20/20 08:30  10/20/20 

08:43





  Dextrose 5%-Lactated Ringers  IV   150 mls/hr





  ASDIRECTED NICOLETTE   Administration














Discontinued Medications














Generic Name Dose Route Start Last Admin





  Trade Name Freq  PRN Reason Stop Dose Admin


 


Ceftriaxone Sodium  Confirm  10/20/20 12:44  10/20/20 12:55





  Rocephin  Administered  10/20/20 12:45  Not Given





  Dose  





  2 gm  





  IV  





  .STK-MED ONE  


 


Magnesium Sulfate 4 gm/ Premix  50 mls @ 12.5 mls/hr  10/20/20 09:26  10/20/20 

09:35





  IV  10/20/20 13:25  12.5 mls/hr





  ONETIME ONE   Administration


 


Ceftriaxone Sodium 2 gm/  100 mls @ 200 mls/hr  10/20/20 09:32  10/20/20 13:10





  Sodium Chloride  IV  10/20/20 10:01  200 mls/hr





  ONETIME ONE   Administration


 


Sodium Chloride  Confirm  10/20/20 12:44  10/20/20 12:55





  Normal Saline  Administered  10/20/20 12:45  Not Given





  Dose  





  50 mls @ as directed  





  .ROUTE  





  .STK-MED ONE  


 


Sodium Chloride  Confirm  10/20/20 12:53  10/20/20 13:11





  Normal Saline  Administered  10/20/20 12:54  Not Given





  Dose  





  100 mls @ as directed  





  .ROUTE  





  .STK-MED ONE  














- Radiology Interpretation


Free Text/Narrative:: 





80-year-old male presents to the ED per [place] [facility] history of 

persistent diarrhea which she states is once or twice daily for the last 2 

weeks.  Increased weakness and near syncopal event occurred this morning when 

they got him up into the bathroom.  Diarrhea apparently is yellowish in color.  

Patient has known metastatic cancer of the prostate gland and is continued to 

slowly lose weight and his health is failing.  Initial diagnosis of prostate 

cancer in 2-year 2000 treated with cesium seed implants.  He is currently on 2 

different medications for prostate  cancer.  He denies being on any antibiotics 

recently.  He appears thin and nearly cachectic in appearance.  Plan IV D5 LR at

 125 mils per hour.  Routine labs to be performed as well as a chest x-ray due 

to productive cough and known COPD.  He has had recent test for influenza and 

Covid 19 and both were reportedly negative.





- Re-Assessments/Exams


Free Text/Narrative Re-Assessment/Exam: 





10/20/20 09:15 portable chest x-ray carried out.  It reveals mild hyperinflated 

lung fields bilaterally.  There appears to be very early streaky infiltrate( 

ground glass appearance)  along the right heart border and right lower lobe of 

the lung.  Possible early pneumonia.  No pleural effusions no pneumothorax 

cardiac silhouette normal





10/20/20 09:20 White count is elevated at 12.20 with 85% neutrophils on the auto

 differential.  Hemoglobin is low at 9.1 with hematocrit of 29.2.  MCV is 83.  

Platelet count 300,000.  The smear reveals platelets to be normal and adequate. 

 Mild neutrophilia and lymphopenia.  PT is 12.1 with an INR of 1.13.  PTT is 30.

  Sodium 137 with potassium of 3.6.  Chloride 102 with a bicarb of 21.  Anion 

gap is elevated at 17.6.  BUN is 21 with a creatinine of 1.3.  Glucose is 124.  

Serum osmolality 290.  Calcium 8.7.  Magnesium is very low at 1.1.  Liver 

function normal troponin I is less than 0.017.  C-reactive protein is elevated 

at 14.9.  BNP is elevated at 1058.  Total protein is 6.7 with an albumin 

fraction of 2.7.  PSA specific antigen is elevated at 11.4.  Serum ketones 

minimally elevated at 0.36.





10/20/20 09:31 ESR has returned elevated at 80.  Will be started on magnesium 4 

g intravenously.  He will be given dose of Rocephin 2 g intravenously due to 

suspect early pneumonia right lower lobe





10/20/20 10:27 The urinalysis reveals 1+ leukocyte esterase and the micro 

reveals 20-30 white blood cells per high-power field suggesting possible source 

for an infection.  Urine culture has been ordered.





10/20/20 12:55 Patient is going to be finishing up his 4 g of magnesium 

intravenously shortly and then will be transferred back to [facility] for convalescent care and continued antibiotic usage with Levaquin to 250

 mg once daily.








Departure





- Departure


Time of Disposition: 13:46


Disposition: DC/Tfer to [facility]


Condition: Poor


Clinical Impression: 


 Prostate cancer metastatic to bone, Hypomagnesemia, Infiltrate of right lung 

present on chest x-ray





Urinary tract infection


Qualifiers:


 Urinary tract infection type: site unspecified Hematuria presence: without 

hematuria Qualified Code(s): N39.0 - Urinary tract infection, site not specified





Anemia


Qualifiers:


 Anemia type: unspecified type Qualified Code(s): D64.9 - Anemia, unspecified





Diarrhea


Qualifiers:


 Diarrhea type: unspecified type Qualified Code(s): R19.7 - Diarrhea, 

unspecified








- Discharge Information


*PRESCRIPTION DRUG MONITORING PROGRAM REVIEWED*: Not Applicable


*COPY OF PRESCRIPTION DRUG MONITORING REPORT IN PATIENT JULIA: Not Applicable


Prescriptions: 


levoFLOXacin [Levaquin] 250 mg PO DAILY #8 tab


Magnesium Chloride [Slow-Mag] 71.5 mg PO BID #60 tablet.


Instructions:  Hypomagnesemia, Urinary Tract Infection, Adult


Referrals: 


Dominic Brandt MD [Primary Care Provider] - 


Forms:  ED Department Discharge


Additional Instructions: 


Evaluation in the emergency room today in regards to generalized weakness and 

gradual failing health primarily secondary to diffuse metastatic prostate 

cancer.  Identified hypomagnesemia which is due to poor nutritional intake.  

Suggest Slow-Mag tablet twice daily for the next month and then have serum 

magnesium checked to see if further treatment is required.  Also identified a 

urinary tract infection with 20-30+ cells per high-power field and a mildly 

elevated white blood cell count without any definite fever.  Chest x-ray also 

suggested a possible very minor early infiltrate right lower lobe.  Therefore 

you were given a dose of antibiotic Rocephin 2 g intravenously in the emergency 

department and you will have to take antibiotic Levaquin to 50 mg once daily for

the next 8 days starting tomorrow morning.  Of note the Levaquin cannot be taken

at the same time as any vitamins, calcium supplements or the magnesium 

supplements they must be taken at least 2 hours apart





Sepsis Event Note (ED)





- Evaluation


Sepsis Screening Result: No Definite Risk





- Focused Exam


Vital Signs: 


                                   Vital Signs











  Temp Pulse Resp BP Pulse Ox


 


 10/20/20 08:07  36.9 C  88  18  116/51 L  92 L














- My Orders


Last 24 Hours: 


My Active Orders





10/20/20 08:18


Chest 1V Frontal [CR] Stat 





10/20/20 08:19


EKG Documentation Completion [RC] STAT 





10/20/20 08:20


STOOL CULTURE/SHIGA TOXIN [MREF] Stat 


WBC, STOOL [OP] Stat 





10/20/20 08:30


Dextrose 5%-Lactated Ringers 1,000 ml IV ASDIRECTED 





10/20/20 09:08


CULTURE URINE [RM] Routine 














- Assessment/Plan


Last 24 Hours: 


My Active Orders





10/20/20 08:18


Chest 1V Frontal [CR] Stat 





10/20/20 08:19


EKG Documentation Completion [RC] STAT 





10/20/20 08:20


STOOL CULTURE/SHIGA TOXIN [MREF] Stat 


WBC, STOOL [OP] Stat 





10/20/20 08:30


Dextrose 5%-Lactated Ringers 1,000 ml IV ASDIRECTED 





10/20/20 09:08


CULTURE URINE [RM] Routine

## 2021-09-10 NOTE — EDM.PDOC
ED HPI GENERAL MEDICAL PROBLEM





- General


Chief Complaint: Gastrointestinal Problem


Stated Complaint: [facility]


Time Seen by Provider: 09/10/21 12:21





- History of Present Illness


INITIAL COMMENTS - FREE TEXT/NARRATIVE: 





81-year-old male brought in by outside EMS.  He had a history of passing out 

after a BM and nursing staff noticed some blood from his rectum.





Patient states earlier today he had a rough BM and had to push pretty hard with 

it.  He does not remember but we was reported to us that he passed out and then 

awoke.  Nursing did notice some bright red blood and a clot.  The patient has 

had problems with hemorrhoids in the past.  It is unclear to me if he has had 

prior problems with passing out with BMs or if he has ever been diagnosed with 

neurocardiogenic syncope.  The patient denies any abdominal pain and is 

otherwise feeling quite well at this time.  Patient is on Plavix.





- Related Data


                                    Allergies











Allergy/AdvReac Type Severity Reaction Status Date / Time


 


No Known Allergies Allergy   Verified 10/20/20 08:04











Home Meds: 


                                    Home Meds





Calcium Carbonate 1,500 mg PO DAILY 05/11/20 [History]


Clopidogrel [Plavix] 75 mg PO DAILY 05/11/20 [History]


FLUoxetine [PROzac] 40 mg PO DAILY 05/11/20 [History]


Multivitamin [Multivitamins] 1 tab PO DAILY 05/11/20 [History]


Pantoprazole Sodium [Protonix] 40 mg PO DAILY 05/11/20 [History]


Vitamin E 400 units PO DAILY 05/11/20 [History]


atorvaSTATin Calcium [Lipitor] 40 mg PO BEDTIME 05/11/20 [History]


Cholecalciferol (Vitamin D3) [Vitamin D3] 1,000 unit PO DAILY 10/20/20 [History]


Levothyroxine [Synthroid] 50 mcg PO ACBREAKFAST 10/20/20 [History]


Loperamide HCl [Imodium A-D] 2 mg PO ASDIRECTED PRN 10/20/20 [History]


Simethicone 80 mg PO Q6H PRN 10/20/20 [History]


Tamsulosin HCl [Flomax] 0.8 mg PO BEDTIME 10/20/20 [History]


hydroCHLOROthiazide [Hydrochlorothiazide] 12.5 mg PO DAILY 10/20/20 [History]


polyethylene glycoL 3350 [MiraLAX] 17 gm PO DAILY PRN 10/20/20 [History]


Acetaminophen [Acetaminophen Extra Strength] 500 mg PO ASDIRECTED PRN 09/10/21 

[History]


Acetaminophen [Aphen] 2 tab PO Q4H PRN 09/10/21 [History]


Alum Hydrox/Mag Hydrox/Simeth [Mag-Al Plus] 20 ml PO ASDIRECTED PRN 09/10/21 

[History]


Ascorbic Acid [C-500] 500 mg PO DAILY 09/10/21 [History]


Calcium Carbonate [Tums] 500 mg PO ASDIRECTED PRN 09/10/21 [History]


Cholestyramine/Sucrose [Cholestyramine] 4 gm PO 1600 09/10/21 [History]


Ferrous Sulfate [Slow Fe] 142 mg PO DAILY 09/10/21 [History]


Fluconazole 150 mg PO ONETIME 09/10/21 [History]


Hydrocortisone [Hydrocortisone 1% Oint] 1 applic TOP BID PRN 09/10/21 [History]


Losartan [Cozaar] 50 mg PO DAILY 09/10/21 [History]


Megestrol [Megace 40 MG/ML Susp] 400 mg PO DAILY 09/10/21 [History]


Mirtazapine 15 mg PO BEDTIME 09/10/21 [History]


Nystatin [Nystatin Crm] 1 applic TOP BID 09/10/21 [History]


Ondansetron [Zofran ODT] 1 tab PO ASDIRECTED PRN 09/10/21 [History]


Pramoxine HCl [Cerave Itch Relief] 1 applic TOP BID PRN 09/10/21 [History]


Triamcinolone Acetonide [Triamcinolone Acetonide 0.1% Crm] 1 applic TOP BID PRN 

09/10/21 [History]











Past Medical History


HEENT History: Reports: Cataract, Impaired Vision


Cardiovascular History: Reports: Hypertension


Respiratory History: Reports: None


Gastrointestinal History: Reports: GERD


Other Gastrointestinal History: ulcer


Other Genitourinary History: Prostate Cancer


Musculoskeletal History: Reports: Arthritis


Neurological History: Reports: Other (See Below)


Other Neuro History: stroke 7 months ago


Psychiatric History: Reports: Addiction, Suicide Attempt


Other Psychiatric History: Suicide attempt 3/1/19-3/2/19


Endocrine/Metabolic History: Reports: Osteopenia


Hematologic History: Reports: None


Immunologic History: Reports: None


Oncologic (Cancer) History: Reports: Prostate


Dermatologic History: Reports: None





- Infectious Disease History


Infectious Disease History: Reports: Chicken Pox





- Past Surgical History


HEENT Surgical History: Reports: Eye Surgery, Oral Surgery, Tonsillectomy


Cardiovascular Surgical History: Reports: None


Male  Surgical History: Reports: Other (See Below)


Other Male  Surgeries/Procedures: patient has seeds placed for prostrate 

cancer.


Musculoskeletal Surgical History: Reports: None


Oncologic Surgical History: Reports: Other (See Below)


Other Oncologic Surgeries/Procedures: Prostate biopsy x2





Social & Family History





- Family History


HEENT: Reports: Impaired Vision


Cardiac: Reports: None


Respiratory: Reports: None


Musculoskeletal: Reports: Arthritis


Neurological: Reports: Alzheimers Disease, Cerebral Aneurysms


Endocrine/Metabolic: Reports: None


Oncologic: Reports: Breast, Prostate





- Tobacco Use


Tobacco Use Status *Q: Never Tobacco User





- Caffeine Use


Caffeine Use: Reports: Coffee, Soda


Other Caffeine Use: Daily





- Recreational Drug Use


Recreational Drug Use: No





- Living Situation & Occupation


Living situation: Reports: Single


Occupation: Retired





ED ROS GENERAL





- Review of Systems


Review Of Systems: See Below


Constitutional: Reports: No Symptoms


HEENT: Reports: No Symptoms


Respiratory: Reports: No Symptoms


Cardiovascular: Reports: No Symptoms


GI/Abdominal: Reports: Constipation.  Denies: Abdominal Pain, Diarrhea


: Reports: No Symptoms


Musculoskeletal: Reports: No Symptoms


Skin: Reports: No Symptoms


Neurological: Reports: No Symptoms


Psychiatric: Reports: No Symptoms


Hematologic/Lymphatic: Reports: No Symptoms


Immunologic: Reports: No Symptoms





ED EXAM, GENERAL





- Physical Exam


Exam: See Below


Exam Limited By: No Limitations


General Appearance: Alert, No Apparent Distress


Head: Atraumatic, Normocephalic


Neck: Normal Inspection, Supple, Non-Tender, Full Range of Motion


Respiratory/Chest: No Respiratory Distress, Lungs Clear, Normal Breath Sounds


Cardiovascular: Regular Rate, Rhythm, No Edema, No Murmur


GI/Abdominal: Normal Bowel Sounds, Soft, Non-Tender


Rectal (Males) Exam: Normal Rectal Tone, Heme + Stool (Stool is weakly Hemoccult

 positive we had some mucousy stool with some bright red streaks in it.), Other 

(Stool noted)


Back Exam: Normal Inspection.  No: CVA Tenderness (L), CVA Tenderness (R)


Neurological: Alert, Oriented, Other (Patient is oriented to date time location 

and name)


  ** #1 Interpretation


EKG Date: 09/10/21


Rhythm: NSR


Rate (Beats/Min): 98


Axis: LAD-Left Axis Deviation


P-Wave: Present


QRS: Other (Interventricular conduction delay possible Q waves in V1 V2 versus 

widening from interventricular conduction delay)


ST-T: Normal


QT: Normal


Comparison: No Change (No significant change from 10/20/2020)


EKG Interpretation Comments: 





Abnormal EKG





Course





- Vital Signs


Last Recorded V/S: 


                                Last Vital Signs











Temp  36.8 C   09/10/21 11:08


 


Pulse  95   09/10/21 11:08


 


Resp  18   09/10/21 11:08


 


BP  130/81   09/10/21 11:08


 


Pulse Ox  97   09/10/21 11:08














- Orders/Labs/Meds


Orders: 


                               Active Orders 24 hr











 Category Date Time Status


 


 Magnesium Sulfate/Water [Magnesium Sulfate in Water 2 Med  09/10/21 13:52 

Active





 GM/50 ML] 2 gm   





 Premix Bag 1 bag   





 IV ONETIME   


 


 EKG 12 Lead [EK] Stat Ther  09/10/21 11:20 Ordered








                                Medication Orders





Magnesium Sulfate 2 gm/ Premix  50 mls @ 25 mls/hr IV ONETIME ONE


   Stop: 09/10/21 15:51


   Last Admin: 09/10/21 13:58  Dose: 25 mls/hr


   Documented by: MANUEL








Labs: 


                                Laboratory Tests











  09/10/21 09/10/21 09/10/21 Range/Units





  11:40 11:40 11:40 


 


WBC  10.25 H    (4.23-9.07)  K/mm3


 


RBC  3.32 L    (4.63-6.08)  M/mm3


 


Hgb  8.8 L    (13.7-17.5)  gm/dl


 


Hct  28.0 L    (40.1-51.0)  %


 


MCV  84.3    (79.0-92.2)  fl


 


MCH  26.5    (25.7-32.2)  pg


 


MCHC  31.4 L    (32.2-35.5)  g/dl


 


RDW Std Deviation  51.6 H    (35.1-43.9)  fL


 


Plt Count  302    (163-337)  K/mm3


 


MPV  10.3    (9.4-12.3)  fl


 


Neut % (Auto)  67.9    (34.0-67.9)  %


 


Lymph % (Auto)  20.7 L    (21.8-53.1)  %


 


Mono % (Auto)  6.7    (5.3-12.2)  %


 


Eos % (Auto)  3.9    (0.8-7.0)  


 


Baso % (Auto)  0.3    (0.1-1.2)  %


 


Neut # (Auto)  6.96 H    (1.78-5.38)  K/mm3


 


Lymph # (Auto)  2.12    (1.32-3.57)  K/mm3


 


Mono # (Auto)  0.69    (0.30-0.82)  K/mm3


 


Eos # (Auto)  0.40    (0.04-0.54)  K/mm3


 


Baso # (Auto)  0.03    (0.01-0.08)  K/mm3


 


Sodium   142   (136-145)  mEq/L


 


Potassium   3.7   (3.5-5.1)  mEq/L


 


Chloride   112 H   ()  mEq/L


 


Carbon Dioxide   15 L   (21-32)  mEq/L


 


Anion Gap   18.7 H   (5-15)  


 


BUN   34 H   (7-18)  mg/dL


 


Creatinine   1.8 H   (0.7-1.3)  mg/dL


 


Est Cr Clr Drug Dosing   35.93   mL/min


 


Estimated GFR (MDRD)   36   (>60)  mL/min


 


BUN/Creatinine Ratio   18.9 H   (14-18)  


 


Glucose   108 H   (70-99)  mg/dL


 


Calcium   8.3 L   (8.5-10.1)  mg/dL


 


Magnesium    1.0 L  (1.8-2.4)  mg/dL


 


Total Bilirubin   0.3   (0.2-1.0)  mg/dL


 


AST   15   (15-37)  U/L


 


ALT   17   (16-63)  U/L


 


Alkaline Phosphatase   87   ()  U/L


 


Troponin I    < 0.017  (0.00-0.056)  ng/mL


 


Total Protein   6.4   (6.4-8.2)  g/dl


 


Albumin   2.6 L   (3.4-5.0)  g/dl


 


Globulin   3.8   gm/dL


 


Albumin/Globulin Ratio   0.7 L   (1-2)  











Meds: 


Medications











Generic Name Dose Route Start Last Admin





  Trade Name Freq  PRN Reason Stop Dose Admin


 


Magnesium Sulfate 2 gm/ Premix  50 mls @ 25 mls/hr  09/10/21 13:52  09/10/21 

13:58





  IV  09/10/21 15:51  25 mls/hr





  ONETIME ONE   Administration














- Re-Assessments/Exams


Free Text/Narrative Re-Assessment/Exam: 





09/10/21 13:58


Magnesium came back quite low at 1.0 we will give him 2 g of IV mag and then 

start him on Mag-Ox 400 mg daily.


09/10/21 15:27


Patient is doing well anticipate discharge when the infusion is done.





Departure





- Departure


Time of Disposition: 15:28


Disposition: DC/Tfer to [facility]


Clinical Impression: 


 Vasovagal episode, Constipation, Hypomagnesemia








- Discharge Information


Referrals: 


Dominic Brandt MD [Primary Care Provider] - 


Forms:  ED Department Discharge


Additional Instructions: 


Return to the emergency room with any questions problems or worsening symptoms.





If symptoms continue consider outpatient cardiac rhythm monitoring.





Start magnesium oxide 400 mg daily.  This may help with the constipation as well

as limit potential rhythm disorders from the heart.





Recheck magnesium level in 1 week





Sepsis Event Note (ED)





- Evaluation


Sepsis Screening Result: No Definite Risk





- Focused Exam


Vital Signs: 


                                   Vital Signs











  Temp Pulse Resp BP Pulse Ox


 


 09/10/21 11:08  36.8 C  95  18  130/81  97














- My Orders


Last 24 Hours: 


My Active Orders





09/10/21 11:20


EKG 12 Lead [EK] Stat 





09/10/21 13:52


Magnesium Sulfate/Water [Magnesium Sulfate in Water 2 GM/50 ML] 2 gm   Premix 

Bag 1 bag IV ONETIME 














- Assessment/Plan


Last 24 Hours: 


My Active Orders





09/10/21 11:20


EKG 12 Lead [EK] Stat 





09/10/21 13:52


Magnesium Sulfate/Water [Magnesium Sulfate in Water 2 GM/50 ML] 2 gm   Premix 

Bag 1 bag IV ONETIME

## 2021-09-15 NOTE — EDM.PDOC
ED HPI GENERAL MEDICAL PROBLEM





- General


Chief Complaint: Gastrointestinal Problem


Stated Complaint: RG AMB


Time Seen by Provider: 09/15/21 11:26


Source of Information: Reports: Patient, RN Notes Reviewed


History Limitations: Reports: No Limitations





- History of Present Illness


INITIAL COMMENTS - FREE TEXT/NARRATIVE: 


Patient is an 81-year-old male presenting to the emergency department from 

[facility] with complaints of rectal bleeding.  Symptoms of been 

occurring since Friday.  Staff report that he had some bloody stools on Friday 

and Saturday.  None on Sunday and then with a describes a large, maroon-colored 

stool today.  Patient reports that he has been constipated and having to strain 

to have bowel movements.  On Friday, he was seen in this emergency department 

after experiencing syncopal episode and blood in his stool.  At that time 

patient reports that he was straining to have a bowel movement as well.  Patient

does not have use of his lower extremities due to history of CVA.  He states 

that he feels really well right now.  Denies any abdominal pain.  He has had no 

nausea or vomiting.  Denies any dizziness.








- Related Data


                                    Allergies











Allergy/AdvReac Type Severity Reaction Status Date / Time


 


No Known Allergies Allergy   Verified 10/20/20 08:04











Home Meds: 


                                    Home Meds





Calcium Carbonate 1,500 mg PO DAILY 05/11/20 [History]


Clopidogrel [Plavix] 75 mg PO DAILY 05/11/20 [History]


FLUoxetine [PROzac] 40 mg PO DAILY 05/11/20 [History]


Multivitamin [Multivitamins] 1 tab PO DAILY 05/11/20 [History]


Pantoprazole Sodium [Protonix] 40 mg PO DAILY 05/11/20 [History]


Vitamin E 400 units PO DAILY 05/11/20 [History]


atorvaSTATin Calcium [Lipitor] 40 mg PO BEDTIME 05/11/20 [History]


Cholecalciferol (Vitamin D3) [Vitamin D3] 1,000 unit PO DAILY 10/20/20 [History]


Levothyroxine [Synthroid] 50 mcg PO ACBREAKFAST 10/20/20 [History]


Loperamide HCl [Imodium A-D] 2 mg PO ASDIRECTED PRN 10/20/20 [History]


Simethicone 80 mg PO Q6H 10/20/20 [History]


Tamsulosin HCl [Flomax] 0.8 mg PO BEDTIME 10/20/20 [History]


hydroCHLOROthiazide [Hydrochlorothiazide] 12.5 mg PO DAILY 10/20/20 [History]


polyethylene glycoL 3350 [MiraLAX] 17 gm PO DAILY PRN 10/20/20 [History]


Acetaminophen [Aphen] 2 tab PO Q4H PRN 09/10/21 [History]


Alum Hydrox/Mag Hydrox/Simeth [Mag-Al Plus] 20 ml PO ASDIRECTED PRN 09/10/21 

[History]


Ascorbic Acid [C-500] 500 mg PO DAILY 09/10/21 [History]


Cholestyramine/Sucrose [Cholestyramine] 4 gm PO 1600 09/10/21 [History]


Ferrous Sulfate [Slow Fe] 142 mg PO DAILY 09/10/21 [History]


Hydrocortisone [Hydrocortisone 1% Oint] 1 applic TOP BID PRN 09/10/21 [History]


Losartan [Cozaar] 50 mg PO DAILY 09/10/21 [History]


Megestrol [Megace 40 MG/ML Susp] 400 mg PO DAILY 09/10/21 [History]


Mirtazapine 15 mg PO BEDTIME 09/10/21 [History]


Nystatin [Nystatin Crm] 1 applic TOP BID 09/10/21 [History]


Ondansetron [Zofran ODT] 1 tab PO Q6H PRN 09/10/21 [History]


Pramoxine HCl [Cerave Itch Relief] 1 applic TOP BID PRN 09/10/21 [History]


Triamcinolone Acetonide [Triamcinolone Acetonide 0.1% Crm] 1 applic TOP BID PRN 

09/10/21 [History]


Acetaminophen [Acetaminophen Extra Strength] 500 mg PO DAILY PRN 09/15/21 

[History]


Calcium Carbonate [Tums] 200 mg PO DAILY PRN 09/15/21 [History]


Cefdinir 300 mg PO BID 10 Days #20 capsule 09/15/21 [Rx]


Ipratropium/Albuterol Sulfate [Iprat-Albut 0.5-3(2.5) mg/3 ml] 1 inh NEB QID 

09/15/21 [History]


Magnesium Oxide 400 mg PO QAM 09/15/21 [History]











Past Medical History


HEENT History: Reports: Cataract, Impaired Vision


Cardiovascular History: Reports: Hypertension


Respiratory History: Reports: None


Gastrointestinal History: Reports: GERD


Other Gastrointestinal History: ulcer


Other Genitourinary History: Prostate Cancer


Musculoskeletal History: Reports: Arthritis


Neurological History: Reports: Other (See Below)


Other Neuro History: stroke 7 months ago


Psychiatric History: Reports: Addiction, Suicide Attempt


Other Psychiatric History: Suicide attempt 3/1/19-3/2/19


Endocrine/Metabolic History: Reports: Osteopenia


Hematologic History: Reports: None


Immunologic History: Reports: None


Oncologic (Cancer) History: Reports: Prostate


Dermatologic History: Reports: None





- Infectious Disease History


Infectious Disease History: Reports: Chicken Pox





- Past Surgical History


HEENT Surgical History: Reports: Eye Surgery, Oral Surgery, Tonsillectomy


Cardiovascular Surgical History: Reports: None


Male  Surgical History: Reports: Other (See Below)


Other Male  Surgeries/Procedures: patient has seeds placed for prostrate 

cancer.


Musculoskeletal Surgical History: Reports: None


Oncologic Surgical History: Reports: Other (See Below)


Other Oncologic Surgeries/Procedures: Prostate biopsy x2





Social & Family History





- Family History


HEENT: Reports: Impaired Vision


Cardiac: Reports: None


Respiratory: Reports: None


Musculoskeletal: Reports: Arthritis


Neurological: Reports: Alzheimers Disease, Cerebral Aneurysms


Endocrine/Metabolic: Reports: None


Oncologic: Reports: Breast, Prostate





- Tobacco Use


Tobacco Use Status *Q: Current Some Day Tobacco User


Years of Tobacco use: 65


Packs/Tins Daily: 0.5





- Caffeine Use


Caffeine Use: Reports: Coffee


Other Caffeine Use: Daily





- Recreational Drug Use


Recreational Drug Use: No





- Living Situation & Occupation


Living situation: Reports: Single


Occupation: Retired





ED ROS GENERAL





- Review of Systems


Review Of Systems: See Below


Constitutional: Reports: No Symptoms


HEENT: Reports: No Symptoms


Respiratory: Reports: No Symptoms


Cardiovascular: Reports: No Symptoms


Endocrine: Reports: No Symptoms


GI/Abdominal: Reports: Bloody Stool, Constipation.  Denies: Abdominal Pain, 

Nausea, Vomiting


: Reports: No Symptoms


Musculoskeletal: Reports: No Symptoms


Skin: Reports: No Symptoms


Neurological: Reports: No Symptoms.  Denies: Dizziness


Psychiatric: Reports: No Symptoms


Hematologic/Lymphatic: Reports: No Symptoms


Immunologic: Reports: No Symptoms





ED EXAM, GI/ABD





- Physical Exam


Exam: See Below


Exam Limited By: No Limitations


General Appearance: Alert, WD/WN, No Apparent Distress


Respiratory/Chest: No Respiratory Distress, Lungs Clear, Normal Breath Sounds, 

No Accessory Muscle Use, Chest Non-Tender


Cardiovascular: Normal Peripheral Pulses, Regular Rate, Rhythm, No Edema, No 

Gallop, No JVD, No Murmur, No Rub


GI/Abdominal Exam: Normal Bowel Sounds, Soft, Non-Tender, No Organomegaly, No 

Distention, No Abnormal Bruit, No Mass, Pelvis Stable


Rectal (Males) Exam: Heme + Stool, Other (Palpable hard of stool high in the 

rectal vault.).  No: Bloody Stool


Neurological: Alert, Oriented, CN II-XII Intact, Normal Cognition, Normal Gait, 

Normal Reflexes, No Motor/Sensory Deficits


Psychiatric: Normal Affect, Normal Mood


Skin Exam: Warm, Dry, Intact, Normal Color, No Rash





Course





- Vital Signs


Last Recorded V/S: 


                                Last Vital Signs











Temp  97 F   09/15/21 11:17


 


Pulse  92   09/15/21 11:17


 


Resp  16   09/15/21 11:17


 


BP  129/76   09/15/21 11:17


 


Pulse Ox  99   09/15/21 11:17














- Orders/Labs/Meds


Orders: 


                               Active Orders 24 hr











 Category Date Time Status


 


 Peripheral IV Care [RC] .AS DIRECTED Care  09/15/21 11:27 Active


 


 Sodium Chloride 0.9% [Normal Saline] 1,000 ml Med  09/15/21 11:27 Active





 IV NOW   


 


 Sodium Chloride 0.9% [Saline Flush] Med  09/15/21 11:27 Active





 10 ml FLUSH ASDIRECTED PRN   


 


 Peripheral IV Insertion Adult [OM.PC] Stat Oth  09/15/21 11:27 Ordered








                                Medication Orders





Sodium Chloride (Normal Saline)  1,000 mls @ 100 mls/hr IV NOW STA


   Stop: 09/15/21 21:26


   Last Infusion: 09/15/21 13:18  Dose: 999 mls/hr


   Documented by: DELIA


   Admin: 09/15/21 11:45  Dose: 100 mls/hr


   Documented by: DELIA


Sodium Chloride (Sodium Chloride 0.9% 10 Ml Syringe)  10 ml FLUSH ASDIRECTED PRN


   PRN Reason: Keep Vein Open


   Last Admin: 09/15/21 11:45  Dose: 10 ml


   Documented by: DELIA








Labs: 


                                Laboratory Tests











  09/15/21 09/15/21 09/15/21 Range/Units





  11:40 11:40 11:40 


 


WBC  9.05    (4.23-9.07)  K/mm3


 


RBC  3.17 L    (4.63-6.08)  M/mm3


 


Hgb  8.4 L    (13.7-17.5)  gm/dl


 


Hct  27.1 L    (40.1-51.0)  %


 


MCV  85.5    (79.0-92.2)  fl


 


MCH  26.5    (25.7-32.2)  pg


 


MCHC  31.0 L    (32.2-35.5)  g/dl


 


RDW Std Deviation  54.6 H    (35.1-43.9)  fL


 


Plt Count  265    (163-337)  K/mm3


 


MPV  11.0    (9.4-12.3)  fl


 


Neut % (Auto)  72.4 H    (34.0-67.9)  %


 


Lymph % (Auto)  17.1 L    (21.8-53.1)  %


 


Mono % (Auto)  7.7    (5.3-12.2)  %


 


Eos % (Auto)  2.3    (0.8-7.0)  


 


Baso % (Auto)  0.3    (0.1-1.2)  %


 


Neut # (Auto)  6.54 H    (1.78-5.38)  K/mm3


 


Lymph # (Auto)  1.55    (1.32-3.57)  K/mm3


 


Mono # (Auto)  0.70    (0.30-0.82)  K/mm3


 


Eos # (Auto)  0.21    (0.04-0.54)  K/mm3


 


Baso # (Auto)  0.03    (0.01-0.08)  K/mm3


 


PT   10.6   (9.7-12.0)  SECONDS


 


INR   0.95   


 


APTT   23.6   (21.7-31.4)  SECONDS


 


Sodium    143  (136-145)  mEq/L


 


Potassium    3.7  (3.5-5.1)  mEq/L


 


Chloride    114 H  ()  mEq/L


 


Carbon Dioxide    14 L  (21-32)  mEq/L


 


Anion Gap    18.7 H  (5-15)  


 


BUN    50 H  (7-18)  mg/dL


 


Creatinine    2.5 H  (0.7-1.3)  mg/dL


 


Est Cr Clr Drug Dosing    21.92  mL/min


 


Estimated GFR (MDRD)    25  (>60)  mL/min


 


BUN/Creatinine Ratio    20.0 H  (14-18)  


 


Glucose    122 H  (70-99)  mg/dL


 


Calcium    7.9 L  (8.5-10.1)  mg/dL


 


Magnesium     (1.8-2.4)  mg/dL


 


Total Bilirubin    0.3  (0.2-1.0)  mg/dL


 


AST    23  (15-37)  U/L


 


ALT    28  (16-63)  U/L


 


Alkaline Phosphatase    83  ()  U/L


 


Total Protein    6.3 L  (6.4-8.2)  g/dl


 


Albumin    2.6 L  (3.4-5.0)  g/dl


 


Globulin    3.7  gm/dL


 


Albumin/Globulin Ratio    0.7 L  (1-2)  


 


Urine Color     (Yellow)  


 


Urine Appearance     (Clear)  


 


Urine pH     (5.0-8.0)  


 


Ur Specific Gravity     (1.005-1.030)  


 


Urine Protein     (Negative)  


 


Urine Glucose (UA)     (Negative)  


 


Urine Ketones     (Negative)  


 


Urine Occult Blood     (Negative)  


 


Urine Nitrite     (Negative)  


 


Urine Bilirubin     (Negative)  


 


Urine Urobilinogen     (0.2-1.0)  


 


Ur Leukocyte Esterase     (Negative)  


 


Urine RBC     (0-5)  /hpf


 


Urine WBC     (0-5)  /hpf


 


Ur Epithelial Cells     (0-5)  /hpf


 


Urine Bacteria     (FEW)  /hpf


 


Urine Mucus     (FEW)  /hpf














  09/15/21 09/15/21 Range/Units





  11:40 12:10 


 


WBC    (4.23-9.07)  K/mm3


 


RBC    (4.63-6.08)  M/mm3


 


Hgb    (13.7-17.5)  gm/dl


 


Hct    (40.1-51.0)  %


 


MCV    (79.0-92.2)  fl


 


MCH    (25.7-32.2)  pg


 


MCHC    (32.2-35.5)  g/dl


 


RDW Std Deviation    (35.1-43.9)  fL


 


Plt Count    (163-337)  K/mm3


 


MPV    (9.4-12.3)  fl


 


Neut % (Auto)    (34.0-67.9)  %


 


Lymph % (Auto)    (21.8-53.1)  %


 


Mono % (Auto)    (5.3-12.2)  %


 


Eos % (Auto)    (0.8-7.0)  


 


Baso % (Auto)    (0.1-1.2)  %


 


Neut # (Auto)    (1.78-5.38)  K/mm3


 


Lymph # (Auto)    (1.32-3.57)  K/mm3


 


Mono # (Auto)    (0.30-0.82)  K/mm3


 


Eos # (Auto)    (0.04-0.54)  K/mm3


 


Baso # (Auto)    (0.01-0.08)  K/mm3


 


PT    (9.7-12.0)  SECONDS


 


INR    


 


APTT    (21.7-31.4)  SECONDS


 


Sodium    (136-145)  mEq/L


 


Potassium    (3.5-5.1)  mEq/L


 


Chloride    ()  mEq/L


 


Carbon Dioxide    (21-32)  mEq/L


 


Anion Gap    (5-15)  


 


BUN    (7-18)  mg/dL


 


Creatinine    (0.7-1.3)  mg/dL


 


Est Cr Clr Drug Dosing    mL/min


 


Estimated GFR (MDRD)    (>60)  mL/min


 


BUN/Creatinine Ratio    (14-18)  


 


Glucose    (70-99)  mg/dL


 


Calcium    (8.5-10.1)  mg/dL


 


Magnesium  1.1 L   (1.8-2.4)  mg/dL


 


Total Bilirubin    (0.2-1.0)  mg/dL


 


AST    (15-37)  U/L


 


ALT    (16-63)  U/L


 


Alkaline Phosphatase    ()  U/L


 


Total Protein    (6.4-8.2)  g/dl


 


Albumin    (3.4-5.0)  g/dl


 


Globulin    gm/dL


 


Albumin/Globulin Ratio    (1-2)  


 


Urine Color   Light yellow  (Yellow)  


 


Urine Appearance   Cloudy H  (Clear)  


 


Urine pH   5.5  (5.0-8.0)  


 


Ur Specific Gravity   1.025  (1.005-1.030)  


 


Urine Protein   2+ H  (Negative)  


 


Urine Glucose (UA)   Negative  (Negative)  


 


Urine Ketones   Negative  (Negative)  


 


Urine Occult Blood   2+ H  (Negative)  


 


Urine Nitrite   Positive H  (Negative)  


 


Urine Bilirubin   Negative  (Negative)  


 


Urine Urobilinogen   0.2  (0.2-1.0)  


 


Ur Leukocyte Esterase   3+ H  (Negative)  


 


Urine RBC   0-5  (0-5)  /hpf


 


Urine WBC   >100 H  (0-5)  /hpf


 


Ur Epithelial Cells   0-5  (0-5)  /hpf


 


Urine Bacteria   Few  (FEW)  /hpf


 


Urine Mucus   Few  (FEW)  /hpf











Meds: 


Medications











Generic Name Dose Route Start Last Admin





  Trade Name Freq  PRN Reason Stop Dose Admin


 


Sodium Chloride  1,000 mls @ 100 mls/hr  09/15/21 11:27  09/15/21 13:18





  Normal Saline  IV  09/15/21 21:26  999 mls/hr





  NOW STA   Infusion


 


Sodium Chloride  10 ml  09/15/21 11:27  09/15/21 11:45





  Sodium Chloride 0.9% 10 Ml Syringe  FLUSH   10 ml





  ASDIRECTED PRN   Administration





  Keep Vein Open  














Discontinued Medications














Generic Name Dose Route Start Last Admin





  Trade Name Shaye  PRN Reason Stop Dose Admin


 


Bisacodyl  10 mg  09/15/21 13:18  09/15/21 13:19





  Bisacodyl 10 Mg Supp  RECTAL  09/15/21 13:19  10 mg





  ONETIME ONE   Administration


 


Ceftriaxone Sodium 2 gm/  100 mls @ 200 mls/hr  09/15/21 13:17  09/15/21 13:22





  Sodium Chloride  IV  09/15/21 13:46  200 mls/hr





  ONETIME ONE   Administration


 


Magnesium Sulfate 4 gm/ Premix  50 mls @ 12.5 mls/hr  09/15/21 13:45  09/15/21 

13:59





  IV  09/15/21 17:44  12.5 mls/hr





  ONETIME ONE   Administration














- Re-Assessments/Exams


Free Text/Narrative Re-Assessment/Exam: 


Patient is an 81-year-old male returning to the emergency department for 

evaluation of ongoing rectal bleeding.  Report is that he had bloody bowel 

movements on Friday and Saturday.  He had improved yesterday, but today after 

having a bowel movement there was "a lot "of blood in the toilet.  Patient 

reports that he has been constipated that he was straining to have a bowel 

movement today as well.  Denies any dizziness or abdominal pain.  I have ordered

 blood work and urinalysis. Patient is having no abdominal pain, therefore 

imaging is not indicated at this time.





09/15/21 13:45


Hematology significant for hemoglobin 8.4, chloride 114, CO2 14, anion gap 18.7,

 BUN 50, creatinine 2.5, magnesium 1.1.  Urine is grossly positive for infec

tion.  Rectal exam did not show any kanika red blood, however stool was Hemoccult

 positive.  There was a palpable hard stool high in the rectal vault.  I have 

ordered a dulcolax suppository as well as Rocephin 2 g IV and magnesium sulfate 

4 g IV.  Case was discussed with general surgeon, Dr. Man.  She is 

recommending patient follow-up in the clinic as his hemoglobin is fairly stable.

  We will continue to monitor the patient as he will be in this emergency 

department for 4 hours to receive the magnesium infusion.  I have also ordered a

 500 mill bolus of normal saline and then continue at 150 ml/hr as labs show 

that he is dry.





09/15/21 16:25


Patient had a medium, formed bowel movement after suppository. Nursing staff 

reports that there was no blood in the stool, however there was a small amount 

of blood when she wiped. Patient is resting comfortably. Vital signs are stable.








09/15/21 17:36


Patient has finished his magnesium infusion and Rocephin IV.  He has had no 

significant rectal bleeding in the emergency department.  We will discharge him 

back to the nursing facility with recommendation to follow-up with the general 

surgeon, Dr. Man, in the clinic at her next available visit.  Return for 

significant bleeding.  Discharge instructions as documented.








Departure





- Departure


Time of Disposition: 17:36


Disposition: Home, Self-Care 01


Condition: Good


Clinical Impression: 


 Hypomagnesemia





Constipation


Qualifiers:


 Constipation type: unspecified constipation type Qualified Code(s): K59.00 - 

Constipation, unspecified





Urinary tract infection


Qualifiers:


 Urinary tract infection type: site unspecified Hematuria presence: without 

hematuria Qualified Code(s): N39.0 - Urinary tract infection, site not specified





GI bleed


Qualifiers:


 GI bleed type/associated pathology: unspecified gastrointestinal hemorrhage 

type Qualified Code(s): K92.2 - Gastrointestinal hemorrhage, unspecified








- Discharge Information


*PRESCRIPTION DRUG MONITORING PROGRAM REVIEWED*: No


*COPY OF PRESCRIPTION DRUG MONITORING REPORT IN PATIENT JULIA: No


Prescriptions: 


Cefdinir 300 mg PO BID 10 Days #20 capsule


Instructions:  Hypomagnesemia, Constipation, Adult


Referrals: 


Dominic Brandt MD [Primary Care Provider] - 


Forms:  ED Department Discharge


Additional Instructions: 


Nicholas was seen in the emergency department today for rectal bleeding and cloudy

urine.  Work-up included blood work and urinalysis.  Results indicated that his 

magnesium was low, therefore this was replaced in the ER.  He was also slightly 

dehydrated, therefore he received IV fluids.  He does have a urinary tract 

infection for which he received IV antibiotics and has been started on Omnicef 

which should begin tomorrow.  When rectal exam was completed, he was found to 

have hard stool.  He received a suppository in the emergency department and did 

have a medium formed bowel movement which was not bloody.  There was a small 

amount of blood noted when wiping.  His case was discussed with the general 

surgeon on-call, Dr. Bravo.  She recommend that he follow-up with her in 

the clinic as he is not having large amounts of active bleeding and his 

hemoglobin has been stable since Friday.  Recommend increase fluid intake and 

continued bowel regimen.  Contact Dr. Bravo's office in the morning to 

set up follow-up visit.  I would recommend follow-up in the clinic on Friday to 

have blood work rechecked.  Return to ER for recurrence of significant rectal 

bleeding or any other concerning symptoms.





Sepsis Event Note (ED)





- Focused Exam


Vital Signs: 


                                   Vital Signs











  Temp Pulse Resp BP Pulse Ox


 


 09/15/21 11:17  97 F  92  16  129/76  99














- My Orders


Last 24 Hours: 


My Active Orders





09/15/21 11:27


Peripheral IV Care [RC] .AS DIRECTED 


Sodium Chloride 0.9% [Normal Saline] 1,000 ml IV NOW 


Sodium Chloride 0.9% [Saline Flush]   10 ml FLUSH ASDIRECTED PRN 


Peripheral IV Insertion Adult [OM.PC] Stat 














- Assessment/Plan


Last 24 Hours: 


My Active Orders





09/15/21 11:27


Peripheral IV Care [RC] .AS DIRECTED 


Sodium Chloride 0.9% [Normal Saline] 1,000 ml IV NOW 


Sodium Chloride 0.9% [Saline Flush]   10 ml FLUSH ASDIRECTED PRN 


Peripheral IV Insertion Adult [OM.PC] Stat

## 2021-10-21 NOTE — CR
Chest: Frontal view of the chest was obtained.

 

Comparison: Prior chest x-ray of 05/11/20.

 

Heart size is slightly enlarged.  Tortuous thoracic aorta is seen.  

Minimal atelectasis is seen within the lateral left costophrenic 

angle.  Lungs otherwise are clear.  Bony structures are diffusely 

osteopenic.

 

Impression:

1.  Slight atelectasis within the lateral left costophrenic angle.

2.  Heart is slightly enlarged.

3.  Other findings as noted above.  Nothing acute is appreciated.

 

Diagnostic code #2

## 2021-10-21 NOTE — EDM.PDOC
ED HPI GENERAL MEDICAL PROBLEM





- General


Chief Complaint: Gastrointestinal Problem


Stated Complaint: FROM [facility]


Time Seen by Provider: 10/21/21 10:58


Source of Information: Reports: Patient, RN Notes Reviewed


History Limitations: Reports: No Limitations





- History of Present Illness


INITIAL COMMENTS - FREE TEXT/NARRATIVE: 





Patient is an 81-year-old male presenting to the emergency department from the 

[facility] with complaints of elevated creatinine and BUN.  Patient

is a resident of [facility] and is currently underg

oing treatment for colon cancer.  He was at the clinic to get his infusion today

when blood work was checked and this was found.  Nursing home staff report that 

he has been gradually declining over the course the last month.  He has been 

increasingly weak and is currently a Annemarie lift for all transfers as he cannot 

bear weight.  He has not been wanting to eat or drink anything.  Patient reports

that he is voiding normally.  He does feel nauseous but has not had any 

vomiting.  Patient has a history of GI bleed and recently had a partial 

colonoscopy time was found to have rectal ulcers.  Patient denies any recent 

rectal bleeding.  Patient is a full code.  Nursing home staff report that there 

have been numerous conversations about his CODE STATUS and patient would like to

continue full CODE STATUS.  Patient denies any pain at this time.  He has got no

chest pain or shortness of breath.  





- Related Data


                                    Allergies











Allergy/AdvReac Type Severity Reaction Status Date / Time


 


No Known Allergies Allergy   Verified 10/21/21 11:16











Home Meds: 


                                    Home Meds





Calcium Carbonate 1,500 mg PO DAILY 05/11/20 [History]


Clopidogrel [Plavix] 75 mg PO DAILY 05/11/20 [History]


FLUoxetine [PROzac] 40 mg PO DAILY 05/11/20 [History]


Multivitamin [Multivitamins] 1 tab PO DAILY 05/11/20 [History]


Vitamin E 400 units PO DAILY 05/11/20 [History]


atorvaSTATin Calcium [Lipitor] 40 mg PO BEDTIME 05/11/20 [History]


Cholecalciferol (Vitamin D3) [Vitamin D3] 1,000 unit PO DAILY 10/20/20 [History]


Levothyroxine [Synthroid] 50 mcg PO ACBREAKFAST 10/20/20 [History]


Loperamide HCl [Imodium A-D] 2 mg PO ASDIRECTED PRN 10/20/20 [History]


Simethicone 80 mg PO QID 10/20/20 [History]


Tamsulosin HCl [Flomax] 0.8 mg PO BEDTIME 10/20/20 [History]


hydroCHLOROthiazide [Hydrochlorothiazide] 12.5 mg PO DAILY 10/20/20 [History]


polyethylene glycoL 3350 [MiraLAX] 17 gm PO DAILY PRN 10/20/20 [History]


Acetaminophen [Aphen] 2 tab PO Q4H PRN 09/10/21 [History]


Alum Hydrox/Mag Hydrox/Simeth [Mag-Al Plus] 20 ml PO ASDIRECTED PRN 09/10/21 

[History]


Ascorbic Acid [C-500] 500 mg PO DAILY 09/10/21 [History]


Cholestyramine/Sucrose [Cholestyramine] 4 gm PO DAILY 09/10/21 [History]


Ferrous Sulfate [Slow Fe] 142 mg PO DAILY 09/10/21 [History]


Hydrocortisone [Hydrocortisone 1% Oint] 1 applic TOP BID PRN 09/10/21 [History]


Losartan [Cozaar] 50 mg PO DAILY 09/10/21 [History]


Megestrol [Megace 40 MG/ML Susp] 400 mg PO DAILY 09/10/21 [History]


Mirtazapine 15 mg PO BEDTIME 09/10/21 [History]


Ondansetron [Zofran ODT] 1 tab PO QID PRN 09/10/21 [History]


Pramoxine HCl [Cerave Itch Relief] 1 applic TOP BID PRN 09/10/21 [History]


Triamcinolone Acetonide [Triamcinolone Acetonide 0.1% Crm] 1 applic TOP BID PRN 

09/10/21 [History]


Acetaminophen [Acetaminophen Extra Strength] 500 mg PO Q4H PRN 09/15/21 

[History]


Magnesium Oxide 400 mg PO QAM 09/15/21 [History]


Abiraterone Acetate [Zytiga] 500 mg PO DAILY 10/21/21 [History]


Calcium Carbonate [Tums] 500 mg PO DAILY PRN 10/21/21 [History]


Sennosides [Senna] 8.6 mg PO DAILY PRN 10/21/21 [History]


cephALEXin [Keflex] 500 mg PO BID 10/21/21 [History]











Past Medical History


HEENT History: Reports: Cataract, Impaired Vision


Cardiovascular History: Reports: Hypertension


Respiratory History: Reports: None


Gastrointestinal History: Reports: GERD


Other Gastrointestinal History: ulcer


Other Genitourinary History: Prostate Cancer


Musculoskeletal History: Reports: Arthritis


Neurological History: Reports: Other (See Below)


Other Neuro History: stroke 7 months ago


Psychiatric History: Reports: Addiction, Suicide Attempt


Other Psychiatric History: Suicide attempt 3/1/19-3/2/19


Endocrine/Metabolic History: Reports: Osteopenia


Hematologic History: Reports: None


Immunologic History: Reports: None


Oncologic (Cancer) History: Reports: Prostate


Dermatologic History: Reports: None





- Infectious Disease History


Infectious Disease History: Reports: Chicken Pox





- Past Surgical History


HEENT Surgical History: Reports: Eye Surgery, Oral Surgery, Tonsillectomy


Cardiovascular Surgical History: Reports: None


Male  Surgical History: Reports: Other (See Below)


Other Male  Surgeries/Procedures: patient has seeds placed for prostrate 

cancer.


Musculoskeletal Surgical History: Reports: None


Oncologic Surgical History: Reports: Other (See Below)


Other Oncologic Surgeries/Procedures: Prostate biopsy x2





Social & Family History





- Family History


HEENT: Reports: Impaired Vision


Cardiac: Reports: None


Respiratory: Reports: None


Musculoskeletal: Reports: Arthritis


Neurological: Reports: Alzheimers Disease, Cerebral Aneurysms


Endocrine/Metabolic: Reports: None


Oncologic: Reports: Breast, Prostate





- Tobacco Use


Tobacco Use Status *Q: Never Tobacco User


Second Hand Smoke Exposure: No





- Caffeine Use


Caffeine Use: Reports: Coffee


Other Caffeine Use: Daily





- Recreational Drug Use


Recreational Drug Use: No





- Living Situation & Occupation


Living situation: Reports: Single


Occupation: Retired





ED ROS GENERAL





- Review of Systems


Review Of Systems: See Below


Constitutional: Reports: Weakness, Fatigue, Decreased Appetite


HEENT: Reports: No Symptoms


Respiratory: Reports: No Symptoms.  Denies: Shortness of Breath, Cough


Cardiovascular: Reports: No Symptoms.  Denies: Chest Pain


Endocrine: Reports: No Symptoms


GI/Abdominal: Reports: Bloody Stool.  Denies: Difficulty Swallowing


: Denies: Dysuria, Flank Pain, Hematuria


Musculoskeletal: Reports: No Symptoms


Skin: Reports: No Symptoms


Neurological: Reports: No Symptoms


Psychiatric: Reports: No Symptoms


Hematologic/Lymphatic: Reports: No Symptoms


Immunologic: Reports: No Symptoms





ED EXAM, RENAL/





- Physical Exam


Exam: See Below


Exam Limited By: No Limitations


General Appearance: Alert, WD/WN, No Apparent Distress, Cachetic


Respiratory/Chest: No Respiratory Distress, Lungs Clear, Normal Breath Sounds, 

No Accessory Muscle Use, Chest Non-Tender


Cardiovascular: Normal Peripheral Pulses, Regular Rate, Rhythm, No Edema, No 

Gallop, No JVD, No Murmur, No Rub


GI/Abdominal: Normal Bowel Sounds, Soft, Non-Tender, No Organomegaly, No 

Distention, No Abnormal Bruit, No Mass


Neurological: Alert, Oriented, CN II-XII Intact, Normal Cognition, Normal 

Reflexes, No Motor/Sensory Deficits


Psychiatric: Normal Affect, Normal Mood


Skin Exam: Warm, Dry, Intact, No Rash, Pallor


  ** #1 Interpretation


EKG Date: 10/21/21


Time: 11:43


Rhythm: NSR


Rate (Beats/Min): 97


Axis: LAD-Left Axis Deviation


P-Wave: Present


QRS: Normal


ST-T: Normal


QT: Prolonged (mildly)





Course





- Vital Signs


Last Recorded V/S: 


                                Last Vital Signs











Temp  97.3 F   10/21/21 11:11


 


Pulse  80   10/21/21 16:15


 


Resp  16   10/21/21 16:15


 


BP  102/68   10/21/21 16:15


 


Pulse Ox  98   10/21/21 16:15














- Orders/Labs/Meds


Orders: 


                               Active Orders 24 hr











 Category Date Time Status


 


 Peripheral IV Insertion Adult [OM.PC] Stat Oth  10/21/21 11:09 Ordered











Labs: 


                                Laboratory Tests











  10/21/21 10/21/21 10/21/21 Range/Units





  11:25 11:25 11:25 


 


WBC  9.46 H    (4.23-9.07)  K/mm3


 


RBC  3.06 L    (4.63-6.08)  M/mm3


 


Hgb  8.4 L    (13.7-17.5)  gm/dl


 


Hct  27.1 L    (40.1-51.0)  %


 


MCV  88.6  D    (79.0-92.2)  fl


 


MCH  27.5    (25.7-32.2)  pg


 


MCHC  31.0 L    (32.2-35.5)  g/dl


 


RDW Std Deviation  57.2 H    (35.1-43.9)  fL


 


Plt Count  400 H D    (163-337)  K/mm3


 


MPV  10.9    (9.4-12.3)  fl


 


Neut % (Auto)  69.7 H    (34.0-67.9)  %


 


Lymph % (Auto)  22.3    (21.8-53.1)  %


 


Mono % (Auto)  5.7    (5.3-12.2)  %


 


Eos % (Auto)  1.7    (0.8-7.0)  


 


Baso % (Auto)  0.2    (0.1-1.2)  %


 


Neut # (Auto)  6.59 H    (1.78-5.38)  K/mm3


 


Lymph # (Auto)  2.11    (1.32-3.57)  K/mm3


 


Mono # (Auto)  0.54    (0.30-0.82)  K/mm3


 


Eos # (Auto)  0.16    (0.04-0.54)  K/mm3


 


Baso # (Auto)  0.02    (0.01-0.08)  K/mm3


 


Sodium   142   (136-145)  mEq/L


 


Potassium   4.5   (3.5-5.1)  mEq/L


 


Chloride   108 H   ()  mEq/L


 


Carbon Dioxide   15 L   (21-32)  mEq/L


 


Anion Gap   23.5 H   (5-15)  


 


BUN   108 H D   (7-18)  mg/dL


 


Creatinine   6.4 H D   (0.7-1.3)  mg/dL


 


Est Cr Clr Drug Dosing   TNP   


 


Estimated GFR (MDRD)   8   (>60)  mL/min


 


BUN/Creatinine Ratio   16.9   (14-18)  


 


Glucose   165 H   (70-99)  mg/dL


 


Calcium   8.3 L   (8.5-10.1)  mg/dL


 


Magnesium     (1.8-2.4)  mg/dL


 


Total Bilirubin   0.3   (0.2-1.0)  mg/dL


 


AST   15   (15-37)  U/L


 


ALT   10 L   (16-63)  U/L


 


Alkaline Phosphatase   78   ()  U/L


 


C-Reactive Protein   4.2 H*   (<1.0)  mg/dL


 


NT-Pro-B Natriuret Pep    1470 H  (0-450)  pg/mL


 


Total Protein   6.9   (6.4-8.2)  g/dl


 


Albumin   2.8 L   (3.4-5.0)  g/dl


 


Globulin   4.1   gm/dL


 


Albumin/Globulin Ratio   0.7 L   (1-2)  


 


SARS-CoV-2 RNA (YURI)     (NEGATIVE)  














  10/21/21 10/21/21 Range/Units





  11:25 11:30 


 


WBC    (4.23-9.07)  K/mm3


 


RBC    (4.63-6.08)  M/mm3


 


Hgb    (13.7-17.5)  gm/dl


 


Hct    (40.1-51.0)  %


 


MCV    (79.0-92.2)  fl


 


MCH    (25.7-32.2)  pg


 


MCHC    (32.2-35.5)  g/dl


 


RDW Std Deviation    (35.1-43.9)  fL


 


Plt Count    (163-337)  K/mm3


 


MPV    (9.4-12.3)  fl


 


Neut % (Auto)    (34.0-67.9)  %


 


Lymph % (Auto)    (21.8-53.1)  %


 


Mono % (Auto)    (5.3-12.2)  %


 


Eos % (Auto)    (0.8-7.0)  


 


Baso % (Auto)    (0.1-1.2)  %


 


Neut # (Auto)    (1.78-5.38)  K/mm3


 


Lymph # (Auto)    (1.32-3.57)  K/mm3


 


Mono # (Auto)    (0.30-0.82)  K/mm3


 


Eos # (Auto)    (0.04-0.54)  K/mm3


 


Baso # (Auto)    (0.01-0.08)  K/mm3


 


Sodium    (136-145)  mEq/L


 


Potassium    (3.5-5.1)  mEq/L


 


Chloride    ()  mEq/L


 


Carbon Dioxide    (21-32)  mEq/L


 


Anion Gap    (5-15)  


 


BUN    (7-18)  mg/dL


 


Creatinine    (0.7-1.3)  mg/dL


 


Est Cr Clr Drug Dosing    


 


Estimated GFR (MDRD)    (>60)  mL/min


 


BUN/Creatinine Ratio    (14-18)  


 


Glucose    (70-99)  mg/dL


 


Calcium    (8.5-10.1)  mg/dL


 


Magnesium   3.1 H  (1.8-2.4)  mg/dL


 


Total Bilirubin    (0.2-1.0)  mg/dL


 


AST    (15-37)  U/L


 


ALT    (16-63)  U/L


 


Alkaline Phosphatase    ()  U/L


 


C-Reactive Protein    (<1.0)  mg/dL


 


NT-Pro-B Natriuret Pep    (0-450)  pg/mL


 


Total Protein    (6.4-8.2)  g/dl


 


Albumin    (3.4-5.0)  g/dl


 


Globulin    gm/dL


 


Albumin/Globulin Ratio    (1-2)  


 


SARS-CoV-2 RNA (YURI)  Negative   (NEGATIVE)  











Meds: 


Medications














Discontinued Medications














Generic Name Dose Route Start Last Admin





  Trade Name Shaye  PRN Reason Stop Dose Admin


 


Sodium Chloride  1,000 mls @ 150 mls/hr  10/21/21 11:28  10/21/21 11:48





  Normal Saline  IV  10/21/21 18:07  150 mls/hr





  NOW STA   Administration


 


Sodium Chloride  10 ml  10/21/21 11:09  10/21/21 11:48





  Sodium Chloride 0.9% 10 Ml Syringe  FLUSH   10 ml





  ASDIRECTED PRN   Administration





  Keep Vein Open  














- Re-Assessments/Exams


Free Text/Narrative Re-Assessment/Exam: 


Patient is an 81-year-old male presenting to the emergency department from the 

[facility] for evaluation of elevated BUN and creatinine.  He is 

currently undergoing chemo treatment for prostate cancer with Zytiga.  He has 

had reduced renal function in the past, however not to this extent.  He is 

denying any pain.  Reports he has been voiding.  I have ordered blood work, EKG,

 chest x-ray, Covid test.  We will start IV fluids of NS at 150 mill per hour





10/21/21 1345


Hematology significant for WBC 9.46, hemoglobin 8.4, chloride 108, CO2 15, anion

 gap 23.5, , creatinine 6.4, magnesium 3.1, CRP 4.2, proBNP 1470.  

Patient is Covid negative.  EKG shows no acute abnormalities.  Chest x-ray 

reveals slight atelectasis of the lateral left costophrenic angle.  Heart is 

slightly enlarged.  Other acute findings.  Case was discussed with hospitalist 

at [facility], Dr. Ferguson.  He has accepted the patient for transfer.





Departure





- Departure


Time of Disposition: 13:45


Disposition: DC/Tfer to [facility] 02


Condition: Fair


Clinical Impression: 


Anemia


Qualifiers:


 Anemia type: unspecified type Qualified Code(s): D64.9 - Anemia, unspecified





Acute on chronic renal failure


Qualifiers:


 Acute renal failure type: unspecified Chronic kidney disease stage: unspecified

 stage Qualified Code(s): N17.9 - Acute kidney failure, unspecified; N18.9 - 

Chronic kidney disease, unspecified








- Discharge Information


Referrals: 


Dominic Brandt MD [Primary Care Provider] - 


Forms:  ED Department Discharge





Sepsis Event Note (ED)





- Evaluation


Sepsis Screening Result: No Definite Risk





- Focused Exam


Vital Signs: 


                                   Vital Signs











  Temp Pulse Resp BP Pulse Ox


 


 10/21/21 16:15   80  16  102/68  98


 


 10/21/21 11:11  97.3 F  104 H  16  91/47 L  93 L














- My Orders


Last 24 Hours: 


My Active Orders





10/21/21 11:09


Peripheral IV Insertion Adult [OM.PC] Stat 














- Assessment/Plan


Last 24 Hours: 


My Active Orders





10/21/21 11:09


Peripheral IV Insertion Adult [OM.PC] Stat

## 2021-11-17 NOTE — PCM.HP.2
H&P History of Present Illness





- General


Date of Service: 11/17/21


Admit Problem/Dx: 


                           Admission Diagnosis/Problem





Admission Diagnosis/Problem      Pericardial effusion








Source of Information: Patient, Provider





- History of Present Illness


Initial Comments - Free Text/Narative: 





81-year-old male with a history of hypertension and prostate cancer who 

presented to the ER due to abdomen lump for 2 days.  Patient had a colonoscopy t

[facility] in [place] by , because of questionable GI bleed. 

After the procedure, he noted a lump in the right lower quadrant abdomen.  

Patient otherwise denies abdominal pain, nausea, vomiting, diarrhea, fever, 

chills, chest pain, palpitation, headache, or dizziness.  He called his PCP 

 who recommended him to the ER for further evaluation.  In the ER, CT 

abdomen was performed, showing small bilateral pleural effusions with bibasilar 

atelectasis.  Pericardial effusion is also noted.  For the pericardial effusion,

ER provider Sonya consulted with cardiologist Dr. Mauro at [facility] in [place], 

who recommended that the patient have an echocardiogram completed and call and 

consult with cardiology tomorrow once we have results of the echocardiogram. So 

Sonya called me for admitting the patient for observation.  





- Related Data


Allergies/Adverse Reactions: 


                                    Allergies











Allergy/AdvReac Type Severity Reaction Status Date / Time


 


No Known Allergies Allergy   Verified 11/17/21 12:27











Home Medications: 


                                    Home Meds





Calcium Carbonate 1,500 mg PO DAILY 05/11/20 [History]


Clopidogrel [Plavix] 75 mg PO DAILY 05/11/20 [History]


FLUoxetine [PROzac] 40 mg PO DAILY 05/11/20 [History]


Multivitamin [Multivitamins] 1 tab PO DAILY 05/11/20 [History]


Vitamin E 400 units PO DAILY 05/11/20 [History]


atorvaSTATin Calcium [Lipitor] 40 mg PO BEDTIME 05/11/20 [History]


Cholecalciferol (Vitamin D3) [Vitamin D3] 1,000 unit PO DAILY 10/20/20 [History]


Levothyroxine [Synthroid] 50 mcg PO ACBREAKFAST 10/20/20 [History]


Loperamide HCl [Imodium A-D] 2 mg PO ASDIRECTED PRN 10/20/20 [History]


Simethicone 80 mg PO QID 10/20/20 [History]


Tamsulosin HCl [Flomax] 0.8 mg PO BEDTIME 10/20/20 [History]


hydroCHLOROthiazide [Hydrochlorothiazide] 12.5 mg PO DAILY 10/20/20 [History]


polyethylene glycoL 3350 [MiraLAX] 17 gm PO DAILY PRN 10/20/20 [History]


Acetaminophen [Aphen] 2 tab PO Q4H PRN 09/10/21 [History]


Alum Hydrox/Mag Hydrox/Simeth [Mag-Al Plus] 20 ml PO ASDIRECTED PRN 09/10/21 

[History]


Ascorbic Acid [C-500] 500 mg PO DAILY 09/10/21 [History]


Cholestyramine/Sucrose [Cholestyramine] 4 gm PO DAILY 09/10/21 [History]


Ferrous Sulfate [Slow Fe] 142 mg PO DAILY 09/10/21 [History]


Hydrocortisone [Hydrocortisone 1% Oint] 1 applic TOP BID PRN 09/10/21 [History]


Losartan [Cozaar] 50 mg PO DAILY 09/10/21 [History]


Megestrol [Megace 40 MG/ML Susp] 400 mg PO DAILY 09/10/21 [History]


Mirtazapine 15 mg PO BEDTIME 09/10/21 [History]


Ondansetron [Zofran ODT] 1 tab PO QID PRN 09/10/21 [History]


Pramoxine HCl [Cerave Itch Relief] 1 applic TOP BID PRN 09/10/21 [History]


Triamcinolone Acetonide [Triamcinolone Acetonide 0.1% Crm] 1 applic TOP BID PRN 

09/10/21 [History]


Acetaminophen [Acetaminophen Extra Strength] 500 mg PO Q4H PRN 09/15/21 

[History]


Magnesium Oxide 400 mg PO QAM 09/15/21 [History]


Abiraterone Acetate [Zytiga] 500 mg PO DAILY 10/21/21 [History]


Calcium Carbonate [Tums] 500 mg PO DAILY PRN 10/21/21 [History]


Sennosides [Senna] 8.6 mg PO DAILY PRN 10/21/21 [History]


cephALEXin [Keflex] 500 mg PO BID 10/21/21 [History]











Past Medical History


HEENT History: Reports: Cataract, Impaired Vision


Cardiovascular History: Reports: Hypertension


Respiratory History: Reports: None


Gastrointestinal History: Reports: GERD


Other Gastrointestinal History: ulcer


Other Genitourinary History: Prostate Cancer


Musculoskeletal History: Reports: Arthritis


Neurological History: Reports: Other (See Below)


Other Neuro History: stroke 7 months ago


Psychiatric History: Reports: Addiction, Suicide Attempt


Other Psychiatric History: Suicide attempt 3/1/19-3/2/19


Endocrine/Metabolic History: Reports: Osteopenia


Hematologic History: Reports: None


Immunologic History: Reports: None


Oncologic (Cancer) History: Reports: Prostate


Dermatologic History: Reports: None





- Infectious Disease History


Infectious Disease History: Reports: Chicken Pox





- Past Surgical History


HEENT Surgical History: Reports: Eye Surgery, Oral Surgery, Tonsillectomy


Cardiovascular Surgical History: Reports: None


GI Surgical History: Reports: Colonoscopy


Male  Surgical History: Reports: Other (See Below)


Other Male  Surgeries/Procedures: patient has seeds placed for prostrate 

cancer.


Musculoskeletal Surgical History: Reports: None


Oncologic Surgical History: Reports: Other (See Below)


Other Oncologic Surgeries/Procedures: Prostate biopsy x2





Social & Family History





- Family History


Family Medical History: No Pertinent Family History


HEENT: Reports: Impaired Vision


Cardiac: Reports: None


Respiratory: Reports: None


Musculoskeletal: Reports: Arthritis


Neurological: Reports: Alzheimers Disease, Cerebral Aneurysms


Endocrine/Metabolic: Reports: None


Oncologic: Reports: Breast, Prostate





- Tobacco Use


Tobacco Use Status *Q: Former Tobacco User


Years of Tobacco use: 60


Packs/Tins Daily: 1


Used Tobacco, but Quit: Yes


Month/Year Tobacco Last Used: 01/2019


Second Hand Smoke Exposure: No





- Caffeine Use


Caffeine Use: Reports: None


Other Caffeine Use: Daily





- Recreational Drug Use


Recreational Drug Use: No





- Living Situation & Occupation


Living situation: Reports: Single


Occupation: Retired





H&P Review of Systems





- Review of Systems:


Review Of Systems: See Below


General: Reports: No Symptoms


HEENT: Reports: No Symptoms


Pulmonary: Reports: No Symptoms


Cardiovascular: Reports: No Symptoms


Gastrointestinal: Reports: Other (abdominal lump)


Genitourinary: Reports: No Symptoms


Musculoskeletal: Reports: No Symptoms


Skin: Reports: No Symptoms


Psychiatric: Reports: No Symptoms


Neurological: Reports: No Symptoms


Hematologic/Lymphatic: Reports: No Symptoms


Immunologic: Reports: No Symptoms





Exam





- Exam


Exam: See Below





- Vital Signs


Vital Signs: 


                                Last Vital Signs











Temp  37.3 C   11/17/21 12:21


 


Pulse  95   11/17/21 12:21


 


Resp  20   11/17/21 12:21


 


BP  135/74   11/17/21 12:21


 


Pulse Ox  98   11/17/21 12:21











Weight: 71.214 kg





- Exam


General: Alert, Oriented, Cooperative


HEENT: Conjunctiva Clear, EOMI, Pupils Equal, Pupils Reactive


Neck: Supple, Trachea Midline, Full Range of Motion


Lungs: Clear to Auscultation, Normal Respiratory Effort


Cardiovascular: Regular Rate, Regular Rhythm, Normal S1, Normal S2


GI/Abdominal Exam: Normal Bowel Sounds, Soft, Non-Tender, Other (4 cm x 2 cm to 

his right lower quadrant)


Extremities: Normal Inspection, Normal Range of Motion, Non-Tender, No Pedal 

Edema


Skin: Warm, Dry, Intact


Neurological: Cranial Nerves Intact, Reflexes Equal Bilateral, Strength Equal 

Bilateral, Normal Speech, Normal Tone, Sensation Intact


Neuro Extensive - Mental Status: Alert, Oriented x3, Normal Mood/Affect


Neuro Extensive - Motor, Sensory, Reflexes: CN II-XII Intact


Psychiatric: Alert, Normal Affect, Normal Mood





- Patient Data


Lab Results Last 24 hrs: 


                         Laboratory Results - last 24 hr











  11/17/21 11/17/21 11/17/21 Range/Units





  13:07 13:07 13:07 


 


WBC  11.83 H    (4.23-9.07)  K/mm3


 


RBC  3.61 L    (4.63-6.08)  M/mm3


 


Hgb  10.4 L D    (13.7-17.5)  gm/dl


 


Hct  32.3 L    (40.1-51.0)  %


 


MCV  89.5    (79.0-92.2)  fl


 


MCH  28.8    (25.7-32.2)  pg


 


MCHC  32.2    (32.2-35.5)  g/dl


 


RDW Std Deviation  54.5 H    (35.1-43.9)  fL


 


Plt Count  247  D    (163-337)  K/mm3


 


MPV  10.5    (9.4-12.3)  fl


 


Neut % (Auto)  75.3 H    (34.0-67.9)  %


 


Lymph % (Auto)  16.9 L    (21.8-53.1)  %


 


Mono % (Auto)  5.3    (5.3-12.2)  %


 


Eos % (Auto)  1.6    (0.8-7.0)  


 


Baso % (Auto)  0.3    (0.1-1.2)  %


 


Neut # (Auto)  8.91 H    (1.78-5.38)  K/mm3


 


Lymph # (Auto)  2.00    (1.32-3.57)  K/mm3


 


Mono # (Auto)  0.63    (0.30-0.82)  K/mm3


 


Eos # (Auto)  0.19    (0.04-0.54)  K/mm3


 


Baso # (Auto)  0.03    (0.01-0.08)  K/mm3


 


Sodium   142   (136-145)  mEq/L


 


Potassium   3.6   (3.5-5.1)  mEq/L


 


Chloride   113 H   ()  mEq/L


 


Carbon Dioxide   13 L   (21-32)  mEq/L


 


Anion Gap   19.6 H   (5-15)  


 


BUN   30 H D   (7-18)  mg/dL


 


Creatinine   1.5 H D   (0.7-1.3)  mg/dL


 


Est Cr Clr Drug Dosing   36.72   mL/min


 


Estimated GFR (MDRD)   45   (>60)  mL/min


 


BUN/Creatinine Ratio   20.0 H   (14-18)  


 


Glucose   127 H   (70-99)  mg/dL


 


Calcium   8.0 L   (8.5-10.1)  mg/dL


 


Magnesium   1.2 L   (1.8-2.4)  mg/dL


 


Total Bilirubin   0.3   (0.2-1.0)  mg/dL


 


AST   16   (15-37)  U/L


 


ALT   23   (16-63)  U/L


 


Alkaline Phosphatase   88   ()  U/L


 


Troponin I    < 0.017  (0.00-0.056)  ng/mL


 


Total Protein   6.0 L   (6.4-8.2)  g/dl


 


Albumin   2.2 L   (3.4-5.0)  g/dl


 


Globulin   3.8   gm/dL


 


Albumin/Globulin Ratio   0.6 L   (1-2)  











Result Diagrams: 


                                 11/17/21 13:07





                                 11/17/21 13:07





Sepsis Event Note





- Evaluation


Sepsis Screening Result: No Definite Risk





- Focused Exam


Vital Signs: 


                                   Vital Signs











  Temp Pulse Resp BP Pulse Ox


 


 11/17/21 12:21  37.3 C  95  20  135/74  98











Problem List Initiated/Reviewed/Updated: Yes


Orders Last 24hrs: 


                               Active Orders 24 hr











 Category Date Time Status


 


 Admission Status [Patient Status] [ADT] Routine ADT  11/17/21 16:30 Active


 


 Intake and Output [RC] QSHIFT Care  11/17/21 16:43 Ordered


 


 Oxygen Therapy [RC] PRN Care  11/17/21 16:42 Ordered


 


 Oxygen Therapy [RC] PRN Care  11/17/21 16:47 Ordered


 


 RT Aerosol Therapy [RC] ASDIRECTED Care  11/17/21 16:48 Ordered


 


 Up With Assistance [RC] ASDIRECTED Care  11/17/21 16:41 Ordered


 


 VTE/DVT Education [RC] PER UNIT ROUTINE Care  11/17/21 16:42 Ordered


 


 VTE/DVT Education [RC] PER UNIT ROUTINE Care  11/17/21 16:47 Ordered


 


 Vital Signs [RC] Q4H Care  11/17/21 16:42 Ordered


 


 Vital Signs [RC] Q4H Care  11/17/21 16:47 Ordered


 


 OT Evaluation and Treatment [CONS] Routine Cons  11/17/21 16:47 Ordered


 


 PT Evaluation and Treatment [CONS] Routine Cons  11/17/21 16:47 Ordered


 


 Regular Diet [DIET] Diet  11/17/21 Dinner Ordered


 


 Echo 2D wo Cont [US] Urgent Exams  11/17/21 16:51 Ordered


 


 Echo Comp wo Cont [US] Stat Exams  11/17/21 16:24 Ordered


 


 CBC WITH AUTO DIFF [HEME] DAILY Lab  11/18/21 05:00 Ordered


 


 CBC WITH AUTO DIFF [HEME] DAILY Lab  11/19/21 05:00 Ordered


 


 COMPREHENSIVE METABOLIC PN,CMP [CHEM] DAILY Lab  11/18/21 05:00 Ordered


 


 COMPREHENSIVE METABOLIC PN,CMP [CHEM] DAILY Lab  11/19/21 05:00 Ordered


 


 CORONAVIRUS COVID-19 YURI [MOLEC] Stat Lab  11/17/21 16:21 Ordered


 


 MAGNESIUM [CHEM] DAILY Lab  11/18/21 05:00 Ordered


 


 PRO B-TYPE NATRIUR PEPT,BNPPRO [CHEM] Routine Lab  11/18/21 05:00 Ordered


 


 TROPONIN I [CHEM] Routine Lab  11/17/21 21:00 Ordered


 


 Abiraterone Acetate [Zytiga] Med  11/17/21 17:00 Ordered





 500 mg PO DAILY   


 


 Acetaminophen [TylenoL] Med  11/17/21 16:47 Ordered





 650 mg PO Q6H PRN   


 


 Albuterol/Ipratropium [DuoNeb 3.0-0.5 MG/3 ML] Med  11/17/21 16:47 Ordered





 3 ml NEB Q4H PRN   


 


 Calcium Carbonate Med  11/17/21 17:00 Ordered





 1,500 mg PO DAILY   


 


 Cholestyramine/Sucrose [Cholestyramine Packet] Med  11/18/21 09:00 Ordered





 4 gm PO DAILY   


 


 Clopidogrel [Plavix] Med  11/17/21 17:00 Ordered





 75 mg PO DAILY   


 


 Enoxaparin [Lovenox] Med  11/17/21 16:45 Ordered





 40 mg SUBCUT DAILY   


 


 FLUoxetine [PROzac] Med  11/18/21 09:00 Ordered





 40 mg PO DAILY   


 


 Ferrous Sulfate [Slow Fe] Med  11/18/21 09:00 Ordered





 142 mg PO DAILY   


 


 Lactated Ringers [Ringers, Lactated] 1,000 ml Med  11/17/21 16:45 Ordered





 IV ASDIRECTED   


 


 Levothyroxine [Synthroid] Med  11/18/21 06:00 Ordered





 50 mcg PO ACBREAKFAST   


 


 Magnesium Sulfate/Water [Magnesium Sulfate in Water 4 Med  11/17/21 14:38 Act

marybel





 GM/50 ML] 4 gm   





 Premix Bag 1 bag   





 IV ONETIME   


 


 Mirtazapine [Remeron] Med  11/17/21 21:00 Ordered





 15 mg PO BEDTIME   


 


 Morphine Med  11/17/21 16:47 Ordered





 2 mg IVPUSH Q4H PRN   


 


 Multivitamin [Multivitamins] Med  11/18/21 09:00 Ordered





 1 tab PO DAILY   


 


 Promethazine [Phenergan] 6.25 mg Med  11/17/21 16:47 Ordered





 Sodium Chloride 0.9% [Normal Saline] 50 ml   





 IV Q6H   


 


 Sennosides [Senna] Med  11/17/21 16:52 Ordered





 8.6 mg PO DAILY PRN   


 


 Simethicone Med  11/17/21 17:00 Ordered





 80 mg PO QID   


 


 Sodium Chloride 0.9% [Saline Flush] Med  11/17/21 13:33 Active





 10 ml FLUSH ONETIME PRN   


 


 Tamsulosin [Flomax] Med  11/17/21 21:00 Ordered





 0.8 mg PO BEDTIME   


 


 Triamcinolone Acetonide [Triamcinolone Acetonide 0.1% Med  11/17/21 16:52 

Ordered





 Crm]   





 1 applic TOP BID PRN   


 


 atorvaSTATin [Lipitor] Med  11/17/21 21:00 Ordered





 40 mg PO BEDTIME   


 


 hydroCHLOROthiazide Med  11/18/21 09:00 Ordered





 12.5 mg PO DAILY   


 


 oxyCODONE Med  11/17/21 16:47 Ordered





 5 mg PO Q6H PRN   


 


 polyethylene glycoL 3350 [MiraLAX] Med  11/17/21 16:52 Ordered





 17 gm PO DAILY PRN   


 


 Resuscitation Status Routine Resus Stat  11/17/21 16:41 Ordered








                                Medication Orders





Acetaminophen (Acetaminophen 325 Mg Tab)  650 mg PO Q6H PRN


   PRN Reason: Pain (Mild 1-3)/fever


Albuterol/Ipratropium (Albuterol/Ipratropium 3.0-0.5 Mg/3 Ml Neb Soln)  3 ml NEB

 Q4H PRN


   PRN Reason: Shortness Of Breath/wheezing


Atorvastatin Calcium (Atorvastatin 40 Mg Tab)  40 mg PO BEDTIME NICOLETTE


Calcium Carbonate/Glycine (Calcium Carbonate 600 Mg Tab)  1,500 mg PO DAILY NICOLETTE


Cholestyramine Resin (Cholestyramine/Sucrose Powder 4 Gm Packet)  4 gm PO DAILY 

NICOLETTE


Clopidogrel Bisulfate (Clopidogrel 75 Mg Tab)  75 mg PO DAILY NICOLETTE


Enoxaparin Sodium (Enoxaparin 40 Mg/0.4 Ml Syringe)  40 mg SUBCUT DAILY NICOLETTE


Hydrochlorothiazide (Hydrochlorothiazide 12.5 Mg Cap)  12.5 mg PO DAILY NICOLETTE


Magnesium Sulfate 4 gm/ Premix  50 mls @ 12.5 mls/hr IV ONETIME ONE


   Stop: 11/17/21 18:37


   Last Admin: 11/17/21 15:00  Dose: 12.5 mls/hr


   Documented by: WALKER


Lactated Ringer's (Ringers, Lactated)  1,000 mls @ 65 mls/hr IV ASDIRECTED NICOLETTE


Promethazine HCl 6.25 mg/ (Sodium Chloride)  50.25 mls @ 100 mls/hr IV Q6H PRN


   PRN Reason: Nausea/Vomiting


Levothyroxine Sodium (Levothyroxine 50 Mcg Tab)  50 mcg PO ACBREAKFAST NICOLETTE


Mirtazapine (Mirtazapine 15 Mg Tab)  15 mg PO BEDTIME NICOLETTE


Morphine Sulfate (Morphine 2 Mg/Ml Syringe)  2 mg IVPUSH Q4H PRN


   PRN Reason: Pain (severe 7-10)


   Stop: 11/18/21 16:48


Non-Formulary Medication (Abiraterone Acetate [Zytiga])  500 mg PO DAILY NICOLETTE


Non-Formulary Medication (Ferrous Sulfate [Slow Fe])  142 mg PO DAILY NICOLETTE


Non-Formulary Medication (Fluoxetine [Prozac])  40 mg PO DAILY NICOLETTE


Non-Formulary Medication (Multivitamin [Multivitamins])  1 tab PO DAILY [facility]


Oxycodone HCl (Oxycodone 5 Mg Tab)  5 mg PO Q6H PRN


   PRN Reason: Pain (moderate 4-6)


Polyethylene Glycol (Polyethylene Glycol 3350 Powder 17 Gm Packet)  17 gm PO 

DAILY PRN


   PRN Reason: Constipation


Senna (Sennosides 8.6 Mg Tab)  8.6 mg PO DAILY PRN


   PRN Reason: Constipation


Simethicone (Simethicone 80 Mg Tab.Chew)  80 mg PO QID [facility]


Sodium Chloride (Sodium Chloride 0.9% 10 Ml Syringe)  10 ml FLUSH ONETIME PRN


   PRN Reason: IV FLUSH


   Last Admin: 11/17/21 14:35  Dose: 10 ml


   Documented by: LEIGHANN


Tamsulosin HCl (Tamsulosin 0.4 Mg Cap.Er)  0.8 mg PO BEDTIME [facility]


Triamcinolone Acetonide (Triamcinolone Acetonide 0.1% Crm 15 Gm Tube)   gm TOP 

BID PRN


   PRN Reason: Itching








Assessment/Plan Comment:: 





81-year-old male with a history of hypertension and prostate cancer who 

presented to the ER due to abdomen lump for 2 days. CT abd showed pericardial 

effusion with no symptoms. 





Assessment and plan:





Pericardial effusion


CT abd showed pericardial effusion noted.


Incidental finding


No symptoms -denies fever, chills, chest pain, palpitation, shortness of breath.


Hemodynamically stable


cardiologist Dr. Mauro at [facility] in [place] was consulted in the ER, who 

recommended echocardiogram and then consult with cardiology again


Dr. Mauro states that the patient's EKG reveals a significant sinus arrhythmia 

with questionable intermittent atrial fibrillation


Troponin


BNP


Echo


TSH





Abdominal mass


CT abd -small bilateral pleural effusions with bibasilar atelectasis.  

Pericardial effusion is also noted.  Mild adenopathy within the retroperitoneum 

which is slightly more prominent than on prior study.  Difficult to exclude 

metastatic disease.  Air within the bladder most likely representing recent 

instrumentation.





HTN


Continue HCTZ 12.5mg daily


Losartan 50mg po daily is on hold


Hydralazine as needed





Hx of prostate cancer


Continue abiraterone 500mg daily


Follow up with his oncologist 





Hypothyroidism


TSH


Continue home med





SYLVIA or CKD


creatinine 1.5. It was 1.3 on 10/20/2020


Avoiding nephrotoxic meds


Gentle IV fluid


Repeat renal function in the morning





DVT prophylaxis: Lovenox





CODE STATUS: Discussed with patient who would like to think about it





- Mortality Measure


Prognosis:: Good

## 2021-11-17 NOTE — CT
CT abdomen and pelvis

 

Technique: Multiple axial sections were obtained from above the lung 

apices inferiorly through the lung bases.  Intravenous contrast was 

utilized.  Oral contrast has also been given.  Reconstructed coronal 

and sagittal images were obtained.

 

Comparison: Prior CT abdomen and pelvis study of 03/06/19.

 

Findings: Small bilateral pleural effusions are seen.  Pericardial 

effusion is also noted.  Areas of atelectasis are noted adjacent to 

the pleural effusions.

 

Liver shows no focal abnormality.  Small hiatal hernia is seen with 

gastroesophageal reflux of contrast.  Spleen size is normal.  Small 

low density finding is noted within the lower spleen which is stable 

from prior exam.  Adrenal glands show no nodule.  Pancreas shows no 

discrete abnormality.  Gallbladder contains no calcified gallstones.  

Kidneys show symmetric contrast enhancement with no hydronephrosis or 

mass.

 

There is mild adenopathy being seen within the retroperitoneum.  

Largest lymph node measures 3.2 cm.  Lymph nodes have slightly 

increased in size from prior exam.  These lymph nodes could be 

malignant in etiology.

 

Distal aorta shows ectasia which is stable from prior exam.  There is 

air being seen within the bladder presumably due to recent 

instrumentation.  Radiation implant seeds are seen within the prostate

 gland.  No pelvic adenopathy is seen.  Slight increased density is 

seen within the subcutaneous fat which is stable from prior exam.

 

Bone window settings were reviewed which show diffuse osteopenia.  

Disc space narrowing is noted within the lower lumbar spine.  Left hip

 prosthesis is noted.

 

Impression:

1.  Small bilateral pleural effusions with bibasilar atelectasis.  

Pericardial effusion is also noted.

2.  Mild adenopathy within the retroperitoneum which is slightly more 

prominent than on prior study.  Difficult to exclude metastatic 

disease.

3.  Air within the bladder most likely representing recent 

instrumentation.

4.  Other nonacute findings as described above.

 

Diagnostic code #9

## 2021-11-17 NOTE — EDM.PDOC
ED HPI GENERAL MEDICAL PROBLEM





- General


Chief Complaint: General


Stated Complaint: KILLDEER AMB


Time Seen by Provider: 11/17/21 11:45


Source of Information: Reports: Patient


History Limitations: Reports: No Limitations





- History of Present Illness


INITIAL COMMENTS - FREE TEXT/NARRATIVE: 


81-year-old male presents the emergency department today with complaints of a 

lump noted in his right lower abdomen.  Patient states he had a colonoscopy 

yesterday at [facility] in [place] by , due to questionable 

GI bleed.  Patient states that immediately after the procedure he noted a lump 

in his right lower abdomen and surgeon was made aware however did not see him 

concerned regarding the issue.  Patient had complaints of the lump still being 

prescient this morning, so a call was made to his primary care provider, 

recommended he be evaluated in the emergency department.  Patient states he has 

not had a bowel movement since prior to the procedure however he has not eaten 

until breakfast this morning.  He denies any abdominal pain, denies fever, 

chills, nausea, vomiting or diarrhea.  States he otherwise feels normal.  Of 

note, patient does have a history of prostate cancer and is currently receiving 

"infusions".   is his oncologist.








- Related Data


                                    Allergies











Allergy/AdvReac Type Severity Reaction Status Date / Time


 


No Known Allergies Allergy   Verified 11/17/21 12:27











Home Meds: 


                                    Home Meds





Calcium Carbonate 1,500 mg PO DAILY 05/11/20 [History]


Clopidogrel [Plavix] 75 mg PO DAILY 05/11/20 [History]


FLUoxetine [PROzac] 40 mg PO DAILY 05/11/20 [History]


Multivitamin [Multivitamins] 1 tab PO DAILY 05/11/20 [History]


Vitamin E 400 units PO DAILY 05/11/20 [History]


atorvaSTATin Calcium [Lipitor] 40 mg PO BEDTIME 05/11/20 [History]


Cholecalciferol (Vitamin D3) [Vitamin D3] 1,000 unit PO DAILY 10/20/20 [History]


Levothyroxine [Synthroid] 50 mcg PO ACBREAKFAST 10/20/20 [History]


Loperamide HCl [Imodium A-D] 2 mg PO ASDIRECTED PRN 10/20/20 [History]


Simethicone 80 mg PO QID 10/20/20 [History]


Tamsulosin HCl [Flomax] 0.8 mg PO BEDTIME 10/20/20 [History]


polyethylene glycoL 3350 [MiraLAX] 17 gm PO DAILY PRN 10/20/20 [History]


Acetaminophen [Aphen] 2 tab PO Q4H PRN 09/10/21 [History]


Alum Hydrox/Mag Hydrox/Simeth [Mag-Al Plus] 20 ml PO ASDIRECTED PRN 09/10/21 [Hi

story]


Ascorbic Acid [C-500] 500 mg PO DAILY 09/10/21 [History]


Cholestyramine/Sucrose [Cholestyramine] 4 gm PO DAILY 09/10/21 [History]


Ferrous Sulfate [Slow Fe] 142 mg PO DAILY 09/10/21 [History]


Hydrocortisone [Hydrocortisone 1% Oint] 1 applic TOP BID PRN 09/10/21 [History]


Losartan [Cozaar] 50 mg PO DAILY 09/10/21 [History]


Megestrol [Megace 40 MG/ML Susp] 400 mg PO DAILY 09/10/21 [History]


Mirtazapine 15 mg PO BEDTIME 09/10/21 [History]


Ondansetron [Zofran ODT] 1 tab PO QID PRN 09/10/21 [History]


Pramoxine HCl [Cerave Itch Relief] 1 applic TOP BID PRN 09/10/21 [History]


Triamcinolone Acetonide [Triamcinolone Acetonide 0.1% Crm] 1 applic TOP BID PRN 

09/10/21 [History]


Magnesium Oxide 400 mg PO QAM 09/15/21 [History]


Calcium Carbonate [Tums] 500 mg PO DAILY PRN 10/21/21 [History]


Sennosides [Senna] 8.6 mg PO DAILY PRN 10/21/21 [History]


Albuterol/Ipratropium [Combivent Respimat] 1 puff INH Q6HR PRN 11/17/21 

[History]


Budesonide/Formoterol [Symbicort 160-4.5 MCG] 2 puff INH BID 11/17/21 [History]


Pantoprazole [ProTONIX***] 40 mg PO DAILY 11/17/21 [History]


Abiraterone Acetate [Zytiga] 500 mg PO DAILY 11/18/21 [Rx]











Past Medical History


HEENT History: Reports: Cataract, Impaired Vision


Cardiovascular History: Reports: Hypertension


Respiratory History: Reports: None


Gastrointestinal History: Reports: GERD


Other Gastrointestinal History: ulcer


Other Genitourinary History: Prostate Cancer


Musculoskeletal History: Reports: Arthritis


Neurological History: Reports: Other (See Below)


Other Neuro History: stroke 7 months ago


Psychiatric History: Reports: Addiction, Suicide Attempt


Other Psychiatric History: Suicide attempt 3/1/19-3/2/19


Endocrine/Metabolic History: Reports: Osteopenia


Hematologic History: Reports: None


Immunologic History: Reports: None


Oncologic (Cancer) History: Reports: Prostate


Dermatologic History: Reports: None





- Infectious Disease History


Infectious Disease History: Reports: Chicken Pox





- Past Surgical History


HEENT Surgical History: Reports: Eye Surgery, Oral Surgery, Tonsillectomy


Cardiovascular Surgical History: Reports: None


GI Surgical History: Reports: Colonoscopy


Male  Surgical History: Reports: Other (See Below)


Other Male  Surgeries/Procedures: patient has seeds placed for prostrate 

cancer.


Musculoskeletal Surgical History: Reports: None


Oncologic Surgical History: Reports: Other (See Below)


Other Oncologic Surgeries/Procedures: Prostate biopsy x2





Social & Family History





- Family History


HEENT: Reports: Impaired Vision


Cardiac: Reports: None


Respiratory: Reports: None


Musculoskeletal: Reports: Arthritis


Neurological: Reports: Alzheimers Disease, Cerebral Aneurysms


Endocrine/Metabolic: Reports: None


Oncologic: Reports: Breast, Prostate





- Tobacco Use


Tobacco Use Status *Q: Former Tobacco User


Years of Tobacco use: 60


Packs/Tins Daily: 1


Used Tobacco, but Quit: Yes


Month/Year Tobacco Last Used: 01/2019


Second Hand Smoke Exposure: No





- Caffeine Use


Caffeine Use: Reports: None


Other Caffeine Use: Daily





- Recreational Drug Use


Recreational Drug Use: No





- Living Situation & Occupation


Living situation: Reports: Single


Occupation: Retired





ED ROS GENERAL





- Review of Systems


Review Of Systems: Comprehensive ROS is negative, except as noted in HPI.





ED EXAM, GENERAL





- Physical Exam


Exam: See Below


Exam Limited By: No Limitations


General Appearance: Alert, WD/WN, No Apparent Distress


Ears: Normal External Exam, Hearing Grossly Normal


Nose: Normal Inspection


Throat/Mouth: Normal Inspection, Normal Lips, Normal Voice, No Airway Compromise


Head: Atraumatic, Normocephalic


Neck: Normal Inspection, Supple


Respiratory/Chest: No Respiratory Distress, Lungs Clear, Normal Breath Sounds, 

No Accessory Muscle Use, Chest Non-Tender


Cardiovascular: Normal Peripheral Pulses, Regular Rate, Rhythm, No Edema, No 

Murmur


Peripheral Pulses: 2+: Radial (L), Radial (R)


GI/Abdominal: Normal Bowel Sounds, Soft, Non-Tender, No Distention, Other (Small

 palpable mass appreciated with palpation to right lower abdomen just adjacent 

to the umbilicus approximately 4 cm x 2 cm)


 (Male) Exam: Deferred


Rectal (Males) Exam: Deferred


Back Exam: Normal Inspection


Extremities: Normal Inspection


Neurological: Alert, Oriented, Normal Cognition


Psychiatric: Normal Affect, Normal Mood


Skin Exam: Warm, Dry, Intact, Normal Color, No Rash


Lymphatic: No Adenopathy





Course





- Vital Signs


Text/Narrative:: 





As stated above, patient presents with a "mass" in his right lower abdomen 

status post colonoscopy yesterday.  Physical exam is essentially unremarkable ho

wever patient does have a palpable mass approximately 4 cm x 2 cm to his right 

lower quadrant.  He denies any tenderness with palpation.  Denies any abdominal 

tenderness at all.  Bowel sounds are positive x4 quadrants.  He is 

hemodynamically stable.  He does have a low-grade temp of 99.1 at the time of 

triage.  Will obtain lab studies to include a CBC, CMP, C-reactive protein, 

magnesium.  Also obtain a CT of the abdomen and pelvis.


Last Recorded V/S: 


                                Last Vital Signs











Temp  97.3 F   11/18/21 07:33


 


Pulse  98   11/18/21 07:33


 


Resp  18   11/18/21 07:33


 


BP  159/95 H  11/18/21 07:33


 


Pulse Ox  97   11/18/21 07:33














- Orders/Labs/Meds


Orders: 


                               Active Orders 24 hr











 Category Date Time Status


 


 Admission Status [Patient Status] [ADT] Routine ADT  11/17/21 16:30 Active


 


 Oxygen Therapy [RC] PRN Care  11/17/21 16:42 Active


 


 RT Aerosol Therapy [RC] ASDIRECTED Care  11/17/21 16:48 Active


 


 Up With Assistance [RC] BID Care  11/17/21 16:41 Active


 


 VTE/DVT Education [RC] DAILY Care  11/17/21 16:42 Active


 


 Vital Signs [RC] Q4HR Care  11/17/21 16:47 Active


 


 OT Evaluation and Treatment [CONS] Routine Cons  11/17/21 16:47 Active


 


 PT Evaluation and Treatment [CONS] Routine Cons  11/17/21 16:47 Active


 


 Regular Diet [DIET] Diet  11/17/21 Dinner Active


 


 CBC WITH AUTO DIFF [HEME] DAILY Lab  11/19/21 05:00 Ordered


 


 COMPREHENSIVE METABOLIC PN,CMP [CHEM] DAILY Lab  11/19/21 05:00 Ordered


 


 Acetaminophen [TylenoL] Med  11/17/21 16:47 Active





 650 mg PO Q6H PRN   


 


 Albuterol/Ipratropium [DuoNeb 3.0-0.5 MG/3 ML] Med  11/17/21 16:47 Active





 3 ml NEB Q4H PRN   


 


 Calcium Carbonate [Tums] Med  11/17/21 17:00 Active





 1,500 mg PO DAILY   


 


 Cholestyramine/Sucrose [Cholestyramine Packet] Med  11/18/21 09:00 Pending





 4 gm PO DAILY   


 


 Clopidogrel [Plavix] Med  11/17/21 17:00 Pending





 75 mg PO DAILY   


 


 Enoxaparin [Lovenox] Med  11/17/21 16:45 Active





 40 mg SUBCUT DAILY   


 


 FLUoxetine [PROzac] Med  11/18/21 09:00 Pending





 40 mg PO DAILY   


 


 Ferrous Sulfate [Slow Fe] Med  11/18/21 09:00 Pending





 142 mg PO DAILY   


 


 Lactated Ringers [Ringers, Lactated] 1,000 ml Med  11/17/21 16:45 Active





 IV ASDIRECTED   


 


 Levothyroxine [Synthroid] Med  11/18/21 06:00 Pending





 50 mcg PO ACBREAKFAST   


 


 Mirtazapine [Remeron] Med  11/17/21 21:00 Pending





 15 mg PO BEDTIME   


 


 Morphine Med  11/17/21 16:47 Active





 2 mg IVPUSH Q4H PRN   


 


 Multivitamins [Tab-A-Yunior] Med  11/18/21 09:00 Active





 1 tab PO DAILY   


 


 Promethazine [Phenergan] 6.25 mg Med  11/17/21 16:47 Active





 Sodium Chloride 0.9% [Normal Saline] 50 ml   





 IV Q6H   


 


 Sennosides [Senna] Med  11/17/21 16:52 Active





 8.6 mg PO DAILY PRN   


 


 Simethicone Med  11/17/21 17:00 Active





 80 mg PO QID   


 


 Sodium Chloride 0.9% [Saline Flush] Med  11/17/21 13:33 Active





 10 ml FLUSH ONETIME PRN   


 


 Tamsulosin [Flomax] Med  11/17/21 21:00 Pending





 0.8 mg PO BEDTIME   


 


 Triamcinolone Acetonide [Triamcinolone Acetonide 0.1% Med  11/17/21 16:52 

Pending





 Crm]   





 DOSE gm TOP BID PRN   


 


 atorvaSTATin [Lipitor] Med  11/17/21 21:00 Pending





 40 mg PO BEDTIME   


 


 hydrALAZINE [Apresoline] Med  11/17/21 17:32 Active





 10 mg IVPUSH Q4H PRN   


 


 oxyCODONE Med  11/17/21 16:47 Active





 5 mg PO Q6H PRN   


 


 polyethylene glycoL 3350 [MiraLAX] Med  11/17/21 16:52 Active





 17 gm PO DAILY PRN   


 


 Resuscitation Status Routine Resus Stat  11/17/21 16:41 Ordered








                                Medication Orders





Acetaminophen (Acetaminophen 325 Mg Tab)  650 mg PO Q6H PRN


   PRN Reason: Pain (Mild 1-3)/fever


Albuterol/Ipratropium (Albuterol/Ipratropium 3.0-0.5 Mg/3 Ml Neb Soln)  3 ml NEB

 Q4H PRN


   PRN Reason: Shortness Of Breath/wheezing


Atorvastatin Calcium (Atorvastatin 40 Mg Tab)  40 mg PO BEDTIME [facility]


Calcium Carbonate/Glycine (Calcium Carbonate 500 Mg Tab.Chew)  1,500 mg PO DAILY

 [facility]


   Last Admin: 11/18/21 08:45  Dose: 1,500 mg


   Documented by: CRYSTAL


   Admin: 11/17/21 18:29 Dose:  Not Given


   Documented by: RHINA


Cholestyramine Resin (Cholestyramine/Sucrose Powder 4 Gm Packet)  4 gm PO DAILY 

[facility]


Clopidogrel Bisulfate (Clopidogrel 75 Mg Tab)  75 mg PO DAILY [facility]


Enoxaparin Sodium (Enoxaparin 40 Mg/0.4 Ml Syringe)  40 mg SUBCUT DAILY [facility]


   Last Admin: 11/18/21 08:42  Dose: 40 mg


   Documented by: CRYSTAL


   Admin: 11/17/21 18:29 Dose:  Not Given


   Documented by: RHINA


Hydralazine HCl (Hydralazine 20 Mg/Ml Sdv)  10 mg IVPUSH Q4H PRN


   PRN Reason: Hypertension


   Last Admin: 11/17/21 21:56  Dose: 10 mg


   Documented by: RHINA


Lactated Ringer's (Ringers, Lactated)  1,000 mls @ 65 mls/hr IV ASDIRECTED [facility]


   Last Admin: 11/17/21 21:55  Dose: 65 mls/hr


   Documented by: RHINA


Promethazine HCl 6.25 mg/ (Sodium Chloride)  50.25 mls @ 100 mls/hr IV Q6H PRN


   PRN Reason: Nausea/Vomiting


Levothyroxine Sodium (Levothyroxine 50 Mcg Tab)  50 mcg PO ACBREAKFAST [facility]


Magnesium Oxide (Magnesium Oxide 400 Mg Tab)  400 mg PO BID [facility]


   Last Admin: 11/18/21 08:46  Dose: 400 mg


   Documented by: CRYSTAL


Mirtazapine (Mirtazapine 15 Mg Tab)  15 mg PO BEDTIME [facility]


Morphine Sulfate (Morphine 2 Mg/Ml Syringe)  2 mg IVPUSH Q4H PRN


   PRN Reason: Pain (severe 7-10)


   Stop: 11/18/21 16:48


Multivitamins/Minerals/Vitamin C (Multivitamin Tab)  1 tab PO DAILY [facility]


   Last Admin: 11/18/21 08:46  Dose: 1 tab


   Documented by: CRYSTAL


Non-Formulary Medication (Ferrous Sulfate [Slow Fe])  142 mg PO DAILY [facility]


Non-Formulary Medication (Fluoxetine [Prozac])  40 mg PO DAILY [facility]


Non-Formulary Medication (Budesonide/Formoterol)  2 puff INH BID [facility]


Non-Formulary Medication (Albuterol/Ipratropium)  1 puff INH Q6HR PRN


   PRN Reason: Wheezing


Oxycodone HCl (Oxycodone 5 Mg Tab)  5 mg PO Q6H PRN


   PRN Reason: Pain (moderate 4-6)


Pantoprazole Sodium (Pantoprazole 40 Mg Tab.Cr)  40 mg PO DAILY [facility]


   Last Admin: 11/18/21 10:55  Dose: 40 mg


   Documented by: CRYSTAL


Polyethylene Glycol (Polyethylene Glycol 3350 Powder 17 Gm Packet)  17 gm PO 

DAILY PRN


   PRN Reason: Constipation


Senna (Sennosides 8.6 Mg Tab)  8.6 mg PO DAILY PRN


   PRN Reason: Constipation


Simethicone (Simethicone 80 Mg Tab.Chew)  80 mg PO QID [facility]


   Last Admin: 11/18/21 08:45  Dose: 80 mg


   Documented by: CRYSTAL


   Admin: 11/17/21 21:42  Dose: 80 mg


   Documented by: RHINA


   Admin: 11/17/21 18:29 Dose:  Not Given


   Documented by: RHINA


Sodium Chloride (Sodium Chloride 0.9% 10 Ml Syringe)  10 ml FLUSH ONETIME PRN


   PRN Reason: IV FLUSH


   Last Admin: 11/17/21 14:35  Dose: 10 ml


   Documented by: LEIGHANN


Tamsulosin HCl (Tamsulosin 0.4 Mg Cap.Er)  0.8 mg PO BEDTIME [facility]


Triamcinolone Acetonide (Triamcinolone Acetonide 0.1% Crm 15 Gm Tube)   gm TOP 

BID PRN


   PRN Reason: Itching








Labs: 


                                Laboratory Tests











  11/17/21 11/17/21 11/17/21 Range/Units





  13:07 13:07 13:07 


 


WBC  11.83 H    (4.23-9.07)  K/mm3


 


RBC  3.61 L    (4.63-6.08)  M/mm3


 


Hgb  10.4 L D    (13.7-17.5)  gm/dl


 


Hct  32.3 L    (40.1-51.0)  %


 


MCV  89.5    (79.0-92.2)  fl


 


MCH  28.8    (25.7-32.2)  pg


 


MCHC  32.2    (32.2-35.5)  g/dl


 


RDW Std Deviation  54.5 H    (35.1-43.9)  fL


 


Plt Count  247  D    (163-337)  K/mm3


 


MPV  10.5    (9.4-12.3)  fl


 


Neut % (Auto)  75.3 H    (34.0-67.9)  %


 


Lymph % (Auto)  16.9 L    (21.8-53.1)  %


 


Mono % (Auto)  5.3    (5.3-12.2)  %


 


Eos % (Auto)  1.6    (0.8-7.0)  


 


Baso % (Auto)  0.3    (0.1-1.2)  %


 


Neut # (Auto)  8.91 H    (1.78-5.38)  K/mm3


 


Lymph # (Auto)  2.00    (1.32-3.57)  K/mm3


 


Mono # (Auto)  0.63    (0.30-0.82)  K/mm3


 


Eos # (Auto)  0.19    (0.04-0.54)  K/mm3


 


Baso # (Auto)  0.03    (0.01-0.08)  K/mm3


 


Sodium   142   (136-145)  mEq/L


 


Potassium   3.6   (3.5-5.1)  mEq/L


 


Chloride   113 H   ()  mEq/L


 


Carbon Dioxide   13 L   (21-32)  mEq/L


 


Anion Gap   19.6 H   (5-15)  


 


BUN   30 H D   (7-18)  mg/dL


 


Creatinine   1.5 H D   (0.7-1.3)  mg/dL


 


Est Cr Clr Drug Dosing   36.72   mL/min


 


Estimated GFR (MDRD)   45   (>60)  mL/min


 


BUN/Creatinine Ratio   20.0 H   (14-18)  


 


Glucose   127 H   (70-99)  mg/dL


 


Calcium   8.0 L   (8.5-10.1)  mg/dL


 


Magnesium   1.2 L   (1.8-2.4)  mg/dL


 


Total Bilirubin   0.3   (0.2-1.0)  mg/dL


 


AST   16   (15-37)  U/L


 


ALT   23   (16-63)  U/L


 


Alkaline Phosphatase   88   ()  U/L


 


Troponin I    < 0.017  (0.00-0.056)  ng/mL


 


Total Protein   6.0 L   (6.4-8.2)  g/dl


 


Albumin   2.2 L   (3.4-5.0)  g/dl


 


Globulin   3.8   gm/dL


 


Albumin/Globulin Ratio   0.6 L   (1-2)  











Meds: 


Medications











Generic Name Dose Route Start Last Admin





  Trade Name Freq  PRN Reason Stop Dose Admin


 


Acetaminophen  650 mg  11/17/21 16:47 





  Acetaminophen 325 Mg Tab  PO  





  Q6H PRN  





  Pain (Mild 1-3)/fever  


 


Albuterol/Ipratropium  3 ml  11/17/21 16:47 





  Albuterol/Ipratropium 3.0-0.5 Mg/3 Ml Neb Soln  NEB  





  Q4H PRN  





  Shortness Of Breath/wheezing  


 


Atorvastatin Calcium  40 mg  11/17/21 21:00 





  Atorvastatin 40 Mg Tab  PO  





  BEDTIME [facility]  


 


Calcium Carbonate/Glycine  1,500 mg  11/17/21 17:00  11/18/21 08:45





  Calcium Carbonate 500 Mg Tab.Chew  PO   1,500 mg





  DAILY NICOLETTE   Administration


 


Cholestyramine Resin  4 gm  11/18/21 09:00 





  Cholestyramine/Sucrose Powder 4 Gm Packet  PO  





  DAILY [facility]  


 


Clopidogrel Bisulfate  75 mg  11/17/21 17:00 





  Clopidogrel 75 Mg Tab  PO  





  DAILY [facility]  


 


Enoxaparin Sodium  40 mg  11/17/21 16:45  11/18/21 08:42





  Enoxaparin 40 Mg/0.4 Ml Syringe  SUBCUT   40 mg





  DAILY NICOLETTE   Administration


 


Hydralazine HCl  10 mg  11/17/21 17:32  11/17/21 21:56





  Hydralazine 20 Mg/Ml Sdv  IVPUSH   10 mg





  Q4H PRN   Administration





  Hypertension  


 


Lactated Ringer's  1,000 mls @ 65 mls/hr  11/17/21 16:45  11/17/21 21:55





  Ringers, Lactated  IV   65 mls/hr





  ASDIRECTED NICOLETTE   Administration


 


Promethazine HCl 6.25 mg/  50.25 mls @ 100 mls/hr  11/17/21 16:47 





  Sodium Chloride  IV  





  Q6H PRN  





  Nausea/Vomiting  


 


Levothyroxine Sodium  50 mcg  11/18/21 06:00 





  Levothyroxine 50 Mcg Tab  PO  





  ACBREAKFAST [facility]  


 


Magnesium Oxide  400 mg  11/18/21 09:00  11/18/21 08:46





  Magnesium Oxide 400 Mg Tab  PO   400 mg





  BID NICOLETTE   Administration


 


Mirtazapine  15 mg  11/17/21 21:00 





  Mirtazapine 15 Mg Tab  PO  





  BEDTIME [facility]  


 


Morphine Sulfate  2 mg  11/17/21 16:47 





  Morphine 2 Mg/Ml Syringe  IVPUSH  11/18/21 16:48 





  Q4H PRN  





  Pain (severe 7-10)  


 


Multivitamins/Minerals/Vitamin C  1 tab  11/18/21 09:00  11/18/21 08:46





  Multivitamin Tab  PO   1 tab





  DAILY NICOLETTE   Administration


 


Non-Formulary Medication  142 mg  11/18/21 09:00 





  Ferrous Sulfate [Slow Fe]  PO  





  DAILY [facility]  


 


Non-Formulary Medication  40 mg  11/18/21 09:00 





  Fluoxetine [Prozac]  PO  





  DAILY [facility]  


 


Non-Formulary Medication  2 puff  11/18/21 09:00 





  Budesonide/Formoterol  INH  





  BID [facility]LETTE  


 


Non-Formulary Medication  1 puff  11/17/21 18:39 





  Albuterol/Ipratropium  INH  





  Q6HR PRN  





  Wheezing  


 


Oxycodone HCl  5 mg  11/17/21 16:47 





  Oxycodone 5 Mg Tab  PO  





  Q6H PRN  





  Pain (moderate 4-6)  


 


Pantoprazole Sodium  40 mg  11/18/21 11:00  11/18/21 10:55





  Pantoprazole 40 Mg Tab.Cr  PO   40 mg





  DAILY NICOLETTE   Administration


 


Polyethylene Glycol  17 gm  11/17/21 16:52 





  Polyethylene Glycol 3350 Powder 17 Gm Packet  PO  





  DAILY PRN  





  Constipation  


 


Senna  8.6 mg  11/17/21 16:52 





  Sennosides 8.6 Mg Tab  PO  





  DAILY PRN  





  Constipation  


 


Simethicone  80 mg  11/17/21 17:00  11/18/21 08:45





  Simethicone 80 Mg Tab.Chew  PO   80 mg





  QID NICOLETTE   Administration


 


Sodium Chloride  10 ml  11/17/21 13:33  11/17/21 14:35





  Sodium Chloride 0.9% 10 Ml Syringe  FLUSH   10 ml





  ONETIME PRN   Administration





  IV FLUSH  


 


Tamsulosin HCl  0.8 mg  11/17/21 21:00 





  Tamsulosin 0.4 Mg Cap.Er  PO  





  BEDTIME [facility]ICOLETTE  


 


Triamcinolone Acetonide   gm  11/17/21 16:52 





  Triamcinolone Acetonide 0.1% Crm 15 Gm Tube  TOP  





  BID PRN  





  Itching  














Discontinued Medications














Generic Name Dose Route Start Last Admin





  Trade Name Freq  PRN Reason Stop Dose Admin


 


Atorvastatin Calcium  40 mg  11/17/21 21:00  11/17/21 21:41





  Atorvastatin 40 Mg Tab  PO  11/17/21 21:01  40 mg





  ONETIME ONE   Administration


 


Diatrizoate Meglum/Diatrizoate Sod  120 ml  11/17/21 13:33  11/17/21 18:29





  Diatrizoate Meglumine/Diatrizoate Sodium 37% 120 Ml Bottle  PO  11/17/21 13:34

  Not Given





  ONETIME ONE  


 


Hydrochlorothiazide  12.5 mg  11/18/21 09:00 





  Hydrochlorothiazide 12.5 Mg Cap  PO  





  DAILY NICOLETTE  


 


Sodium Chloride  500 mls @ 500 mls/hr  11/17/21 14:18  11/17/21 15:00





  Normal Saline  IV  11/17/21 15:17  500 mls/hr





  .BOLUS ONE   Administration


 


Magnesium Sulfate 4 gm/ Premix  50 mls @ 12.5 mls/hr  11/17/21 14:38  11/17/21 

15:00





  IV  11/17/21 18:37  12.5 mls/hr





  ONETIME ONE   Administration


 


Magnesium Sulfate 2 gm/ Premix  50 mls @ 25 mls/hr  11/18/21 08:11  11/18/21 0

8:46





  IV  11/18/21 10:10  25 mls/hr





  ONETIME ONE   Administration


 


Iopamidol  100 ml  11/17/21 13:33  11/17/21 14:34





  Iopamidol 612 Mg/Ml 100 Ml Bottle  IVPUSH  11/17/21 13:34  100 ml





  ONETIME ONE   Administration


 


Mirtazapine  15 mg  11/17/21 20:30  11/17/21 21:42





  Mirtazapine 15 Mg Tab  PO  11/17/21 20:31  15 mg





  ONETIME ONE   Administration


 


Non-Formulary Medication  500 mg  11/17/21 17:00 





  Abiraterone Acetate [Zytiga]  PO  





  DAILY NICOLETTE  


 


Potassium Chloride  40 meq  11/17/21 14:39  11/17/21 15:00





  Potassium Chloride 20 Meq Tab.Er  PO  11/17/21 14:40  40 meq





  ONETIME ONE   Administration


 


Tamsulosin HCl  0.8 mg  11/17/21 21:00  11/17/21 21:42





  Tamsulosin 0.4 Mg Cap.Er  PO  11/17/21 21:01  0.8 mg





  ONETIME ONE   Administration














- Re-Assessments/Exams


Free Text/Narrative Re-Assessment/Exam: 





11/17/21 14:38


Hematology reveals a WBC of 11.83, hemoglobin 10.4, hematocrit 32.3, platelet 

count 247





Chemistry reveals a sodium of 142, potassium 3.6, chloride 113, anion gap 19.6, 

BUN 30, creatinine 1.5, GFR 45, glucose 127, magnesium 1.2





Awaiting results for CT of the abdomen.  Patient will also receive magnesium 4 g

 IV.


11/17/21 15:34


Radiologist impression CT of the abdomen and pelvis: Small bilateral pleural 

effusions are seen.  Pericardial effusion is also noted.  Area of atelectasis 

are noted adjacent to the pleural effusions.





Liver shows no focal abnormality.  Small hiatal hernia seen within the 

gastroesophageal reflux of contrast.  Spleen size is normal.  Small low-density 

finding is noted within the lower spleen which is stable from prior exam.  

Adrenal glands show no nodule.  Pancreas shows no discrete abnormality.  

Gallbladder contains no calcified gallstones.  Kidneys show symmetric contrast 

enhancement with no hydronephrosis or mass.





There is mild adenopathy being seen within the retroperitoneum.  Largest lymph 

node measures 3.2 cm.  Lymph nodes have slightly increased in size from prior 

exam.  These lymph nodes could be malignant in etiology.





Distal aorta shows ectasia which is stable from prior exam.  There is air being 

seen within the bladder presumably due to recent instrumentation.  Radiation 

implant seeds are seen within the prostate gland.  No pelvic adenopathy is seen.

  Slight increased density is seen within the subcutaneous fat which is stable 

from prior exam.





Dysphasia narrowing is noted within the lower lumbar spine.  Left hip prosthesis

 is noted.





Impression: Small bilateral pleural effusions with bibasilar atelectasis.  

Pericardial effusion is also noted.


2.  Mild adenopathy within the retroperitoneum which is slightly more prominent 

than on prior study.  Difficult to exclude metastatic disease.


3.  Air within the bladder most likely representing recent instrumentation.


4.  Other nonacute findings as described above.


11/17/21 15:51


Call was placed to [facility] call to consult with cardiology.  They are 

requesting that I have an EKG prior to speaking to the cardiologist.


11/17/21 16:12


Copy of the EKG was sent to Dr. Mauro to review.  Discussed the case with Dr. Mauro, cardiologist at [facility] in [place] and he recommended that the patient 

have an echocardiogram completed and call and consult with cardiology tomorrow 

once we have results of the echocardiogram.  Dr. Mauro states that the patient's 

EKG reveals a significant sinus arrhythmia with questionable intermittent atrial

 fibrillation


11/17/21 16:20


Discussed the case with , who agrees to accept the patient.  I have 

ordered a troponin to the patient's lab work that has already been collected.











Departure





- Departure


Time of Disposition: 21:33


Disposition: Refer to Observation


Condition: Good


Clinical Impression: 


 Pericardial effusion








- Discharge Information





Sepsis Event Note (ED)





- Evaluation


Sepsis Screening Result: No Definite Risk





- My Orders


Last 24 Hours: 


My Active Orders





11/17/21 13:33


Sodium Chloride 0.9% [Saline Flush]   10 ml FLUSH ONETIME PRN 





11/17/21 16:30


Admission Status [Patient Status] [ADT] Routine 














- Assessment/Plan


Last 24 Hours: 


My Active Orders





11/17/21 13:33


Sodium Chloride 0.9% [Saline Flush]   10 ml FLUSH ONETIME PRN 





11/17/21 16:30


Admission Status [Patient Status] [ADT] Routine

## 2021-11-18 NOTE — [id number]
**Discharge Summary





- Hospital Course


HPI Initial Comments: 








81-year-old male with a history of hypertension and prostate cancer who 

presented to the ER due to abdomen lump for 2 days.  Patient had a colonoscopy t

[facility] in [place] by , because of questionable GI bleed. 

After the procedure, he noted a lump in the right lower quadrant abdomen.  

Patient otherwise denies abdominal pain, nausea, vomiting, diarrhea, fever, 

chills, chest pain, palpitation, headache, or dizziness.  He called his PCP 

 who recommended him to the ER for further evaluation.  In the ER, CT 

abdomen was performed, showing small bilateral pleural effusions with bibasilar 

atelectasis.  Pericardial effusion is also noted.  For the pericardial effusion,

ER provider Sonya consulted with cardiologist Dr. Mauro at [facility] in [place], 

who recommended that the patient have an echocardiogram completed and call and c

onsult with cardiology tomorrow once we have results of the echocardiogram. So 

Sonya called me for admitting the patient for observation.  


Diagnosis: Stroke: No





- Discharge Data


Discharge Date: 11/18/21


Discharge Disposition: Home, Self-Care 01


Condition: Good





- Referral to Home Health


Primary Care Physician: 


Dominic Brandt MD








- Patient Summary/Data


Consults: 


                                  Consultations





11/17/21 16:47


OT Evaluation and Treatment [CONS] Routine 


PT Evaluation and Treatment [CONS] Routine 











Hospital Course: 





Patient was admitted overnight.  Echocardiogram done in the emergency department

 showed left regular ejection fraction, by visual estimation, is 55 to 60%.  

There is a small pericardial effusion.  Pericardial fat pad is present.  No 

tamponade apparent.  It was a technically difficult study with suboptimal image 

quality.  Endocardium not well visualized.  Suspect normal regional wall motion.

  Patient had an uneventful night and plan was to discharge him to go to his 

nephrology appointment at noon.  Of note he did have a magnesium of 1.3.  We did

 give him 2 g IV magnesium.  He will continue on magnesium as an outpatient and 

follow-up with his nephrologist.





- Patient Instructions


Diet: Heart Healthy Diet, Usual Diet as Tolerated


Driving: Do Not Drive


Showering/Bathing: May Shower


Other/Special Instructions: Follow-up next week with primary care provider.  

Follow-up today with nephrology.  Echocardiogram showed only a small pericardial

 effusion without tamponade.  Left ventricular fraction was estimated to be 55 

to 60%.  It appears normal regional wall motion although it is limited in 

quality.





- Discharge Plan


*PRESCRIPTION DRUG MONITORING PROGRAM REVIEWED*: No


*COPY OF PRESCRIPTION DRUG MONITORING REPORT IN PATIENT JULIA: No


Home Medications: 


                                    Home Meds





Calcium Carbonate 1,500 mg PO DAILY 05/11/20 [History]


Clopidogrel [Plavix] 75 mg PO DAILY 05/11/20 [History]


FLUoxetine [PROzac] 40 mg PO DAILY 05/11/20 [History]


Multivitamin [Multivitamins] 1 tab PO DAILY 05/11/20 [History]


Vitamin E 400 units PO DAILY 05/11/20 [History]


atorvaSTATin Calcium [Lipitor] 40 mg PO BEDTIME 05/11/20 [History]


Cholecalciferol (Vitamin D3) [Vitamin D3] 1,000 unit PO DAILY 10/20/20 [History]


Levothyroxine [Synthroid] 50 mcg PO ACBREAKFAST 10/20/20 [History]


Loperamide HCl [Imodium A-D] 2 mg PO ASDIRECTED PRN 10/20/20 [History]


Simethicone 80 mg PO QID 10/20/20 [History]


Tamsulosin HCl [Flomax] 0.8 mg PO BEDTIME 10/20/20 [History]


polyethylene glycoL 3350 [MiraLAX] 17 gm PO DAILY PRN 10/20/20 [History]


Acetaminophen [Aphen] 2 tab PO Q4H PRN 09/10/21 [History]


Alum Hydrox/Mag Hydrox/Simeth [Mag-Al Plus] 20 ml PO ASDIRECTED PRN 09/10/21 

[History]


Ascorbic Acid [C-500] 500 mg PO DAILY 09/10/21 [History]


Cholestyramine/Sucrose [Cholestyramine] 4 gm PO DAILY 09/10/21 [History]


Ferrous Sulfate [Slow Fe] 142 mg PO DAILY 09/10/21 [History]


Hydrocortisone [Hydrocortisone 1% Oint] 1 applic TOP BID PRN 09/10/21 [History]


Losartan [Cozaar] 50 mg PO DAILY 09/10/21 [History]


Megestrol [Megace 40 MG/ML Susp] 400 mg PO DAILY 09/10/21 [History]


Mirtazapine 15 mg PO BEDTIME 09/10/21 [History]


Ondansetron [Zofran ODT] 1 tab PO QID PRN 09/10/21 [History]


Pramoxine HCl [Cerave Itch Relief] 1 applic TOP BID PRN 09/10/21 [History]


Triamcinolone Acetonide [Triamcinolone Acetonide 0.1% Crm] 1 applic TOP BID PRN 

09/10/21 [History]


Magnesium Oxide 400 mg PO QAM 09/15/21 [History]


Calcium Carbonate [Tums] 500 mg PO DAILY PRN 10/21/21 [History]


Sennosides [Senna] 8.6 mg PO DAILY PRN 10/21/21 [History]


Albuterol/Ipratropium [Combivent Respimat] 1 puff INH Q6HR PRN 11/17/21 [

History]


Budesonide/Formoterol [Symbicort 160-4.5 MCG] 2 puff INH BID 11/17/21 [History]


Pantoprazole [ProTONIX***] 40 mg PO DAILY 11/17/21 [History]


Abiraterone Acetate [Zytiga] 500 mg PO DAILY 11/18/21 [Rx]








Oxygen Therapy Mode: Room Air


Patient Handouts:  Indwelling Urinary Catheter Care, Adult, Pericardial Effusion


Forms:  ED Department Discharge


Referrals: 


Dominic Brandt MD [Primary Care Provider] - 11/24/21 3:30 pm (Please check in at 

3:15)


Medhat Duran MD [Ordering Only Provider] -  (Follow up as needed)





- Discharge Summary/Plan Comment


DC Time >30 min.: No


Total # of Minutes for Discharge Time: 





20





- General Info


Date of Service: 11/18/21


Admission Dx/Problem (Free Text: 


                           Admission Diagnosis/Problem





Admission Diagnosis/Problem      Pericardial effusion








Subjective Update: 





Patient has no complaints.  He denies any pain.  No shortness of breath.  No 

chest pain, shortness of breath, wheezing, palpitations, orthopnea, PND.


Functional Status: Reports: Pain Controlled





- Review of Systems


General: Reports: No Symptoms


HEENT: Reports: No Symptoms


Pulmonary: Reports: No Symptoms


Cardiovascular: Reports: No Symptoms


Gastrointestinal: Reports: No Symptoms


Musculoskeletal: Reports: No Symptoms





- Patient Data


Vitals - Most Recent: 


                                Last Vital Signs











Temp  97.3 F   11/18/21 07:33


 


Pulse  98   11/18/21 07:33


 


Resp  18   11/18/21 07:33


 


BP  159/95 H  11/18/21 07:33


 


Pulse Ox  97   11/18/21 07:33











Weight - Most Recent: 158 lb 4.8 oz


I&O - Last 24 hours: 


                                 Intake & Output











 11/17/21 11/18/21 11/18/21





 22:59 06:59 14:59


 


Intake Total  800 


 


Output Total  600 


 


Balance  200 











Lab Results - Last 24 hrs: 


                         Laboratory Results - last 24 hr











  11/17/21 11/17/21 11/17/21 Range/Units





  13:07 13:07 13:07 


 


WBC  11.83 H    (4.23-9.07)  K/mm3


 


RBC  3.61 L    (4.63-6.08)  M/mm3


 


Hgb  10.4 L D    (13.7-17.5)  gm/dl


 


Hct  32.3 L    (40.1-51.0)  %


 


MCV  89.5    (79.0-92.2)  fl


 


MCH  28.8    (25.7-32.2)  pg


 


MCHC  32.2    (32.2-35.5)  g/dl


 


RDW Std Deviation  54.5 H    (35.1-43.9)  fL


 


Plt Count  247  D    (163-337)  K/mm3


 


MPV  10.5    (9.4-12.3)  fl


 


Neut % (Auto)  75.3 H    (34.0-67.9)  %


 


Lymph % (Auto)  16.9 L    (21.8-53.1)  %


 


Mono % (Auto)  5.3    (5.3-12.2)  %


 


Eos % (Auto)  1.6    (0.8-7.0)  


 


Baso % (Auto)  0.3    (0.1-1.2)  %


 


Neut # (Auto)  8.91 H    (1.78-5.38)  K/mm3


 


Lymph # (Auto)  2.00    (1.32-3.57)  K/mm3


 


Mono # (Auto)  0.63    (0.30-0.82)  K/mm3


 


Eos # (Auto)  0.19    (0.04-0.54)  K/mm3


 


Baso # (Auto)  0.03    (0.01-0.08)  K/mm3


 


Sodium   142   (136-145)  mEq/L


 


Potassium   3.6   (3.5-5.1)  mEq/L


 


Chloride   113 H   ()  mEq/L


 


Carbon Dioxide   13 L   (21-32)  mEq/L


 


Anion Gap   19.6 H   (5-15)  


 


BUN   30 H D   (7-18)  mg/dL


 


Creatinine   1.5 H D   (0.7-1.3)  mg/dL


 


Est Cr Clr Drug Dosing   36.72   mL/min


 


Estimated GFR (MDRD)   45   (>60)  mL/min


 


BUN/Creatinine Ratio   20.0 H   (14-18)  


 


Glucose   127 H   (70-99)  mg/dL


 


Calcium   8.0 L   (8.5-10.1)  mg/dL


 


Magnesium   1.2 L   (1.8-2.4)  mg/dL


 


Total Bilirubin   0.3   (0.2-1.0)  mg/dL


 


AST   16   (15-37)  U/L


 


ALT   23   (16-63)  U/L


 


Alkaline Phosphatase   88   ()  U/L


 


Troponin I    < 0.017  (0.00-0.056)  ng/mL


 


NT-Pro-B Natriuret Pep     (0-450)  pg/mL


 


Total Protein   6.0 L   (6.4-8.2)  g/dl


 


Albumin   2.2 L   (3.4-5.0)  g/dl


 


Globulin   3.8   gm/dL


 


Albumin/Globulin Ratio   0.6 L   (1-2)  


 


TSH 3rd Generation     (0.358-3.74)  uIU/mL


 


SARS-CoV-2 RNA (YURI)     (NEGATIVE)  


 


MRSA (PCR)     














  11/17/21 11/17/21 11/17/21 Range/Units





  18:55 20:56 21:22 


 


WBC     (4.23-9.07)  K/mm3


 


RBC     (4.63-6.08)  M/mm3


 


Hgb     (13.7-17.5)  gm/dl


 


Hct     (40.1-51.0)  %


 


MCV     (79.0-92.2)  fl


 


MCH     (25.7-32.2)  pg


 


MCHC     (32.2-35.5)  g/dl


 


RDW Std Deviation     (35.1-43.9)  fL


 


Plt Count     (163-337)  K/mm3


 


MPV     (9.4-12.3)  fl


 


Neut % (Auto)     (34.0-67.9)  %


 


Lymph % (Auto)     (21.8-53.1)  %


 


Mono % (Auto)     (5.3-12.2)  %


 


Eos % (Auto)     (0.8-7.0)  


 


Baso % (Auto)     (0.1-1.2)  %


 


Neut # (Auto)     (1.78-5.38)  K/mm3


 


Lymph # (Auto)     (1.32-3.57)  K/mm3


 


Mono # (Auto)     (0.30-0.82)  K/mm3


 


Eos # (Auto)     (0.04-0.54)  K/mm3


 


Baso # (Auto)     (0.01-0.08)  K/mm3


 


Sodium     (136-145)  mEq/L


 


Potassium     (3.5-5.1)  mEq/L


 


Chloride     ()  mEq/L


 


Carbon Dioxide     (21-32)  mEq/L


 


Anion Gap     (5-15)  


 


BUN     (7-18)  mg/dL


 


Creatinine     (0.7-1.3)  mg/dL


 


Est Cr Clr Drug Dosing     mL/min


 


Estimated GFR (MDRD)     (>60)  mL/min


 


BUN/Creatinine Ratio     (14-18)  


 


Glucose     (70-99)  mg/dL


 


Calcium     (8.5-10.1)  mg/dL


 


Magnesium     (1.8-2.4)  mg/dL


 


Total Bilirubin     (0.2-1.0)  mg/dL


 


AST     (15-37)  U/L


 


ALT     (16-63)  U/L


 


Alkaline Phosphatase     ()  U/L


 


Troponin I   < 0.017   (0.00-0.056)  ng/mL


 


NT-Pro-B Natriuret Pep     (0-450)  pg/mL


 


Total Protein     (6.4-8.2)  g/dl


 


Albumin     (3.4-5.0)  g/dl


 


Globulin     gm/dL


 


Albumin/Globulin Ratio     (1-2)  


 


TSH 3rd Generation     (0.358-3.74)  uIU/mL


 


SARS-CoV-2 RNA (YURI)  Negative    (NEGATIVE)  


 


MRSA (PCR)    Positive H  














  11/18/21 11/18/21 11/18/21 Range/Units





  04:47 04:47 04:49 


 


WBC    9.58 H  (4.23-9.07)  K/mm3


 


RBC    3.66 L  (4.63-6.08)  M/mm3


 


Hgb    10.5 L  (13.7-17.5)  gm/dl


 


Hct    33.0 L  (40.1-51.0)  %


 


MCV    90.2  (79.0-92.2)  fl


 


MCH    28.7  (25.7-32.2)  pg


 


MCHC    31.8 L  (32.2-35.5)  g/dl


 


RDW Std Deviation    54.8 H  (35.1-43.9)  fL


 


Plt Count    267  (163-337)  K/mm3


 


MPV    11.2  (9.4-12.3)  fl


 


Neut % (Auto)    71.7 H  (34.0-67.9)  %


 


Lymph % (Auto)    19.4 L  (21.8-53.1)  %


 


Mono % (Auto)    5.8  (5.3-12.2)  %


 


Eos % (Auto)    2.1  (0.8-7.0)  


 


Baso % (Auto)    0.3  (0.1-1.2)  %


 


Neut # (Auto)    6.86 H  (1.78-5.38)  K/mm3


 


Lymph # (Auto)    1.86  (1.32-3.57)  K/mm3


 


Mono # (Auto)    0.56  (0.30-0.82)  K/mm3


 


Eos # (Auto)    0.20  (0.04-0.54)  K/mm3


 


Baso # (Auto)    0.03  (0.01-0.08)  K/mm3


 


Sodium  142    (136-145)  mEq/L


 


Potassium  4.9    (3.5-5.1)  mEq/L


 


Chloride  113 H    ()  mEq/L


 


Carbon Dioxide  15 L    (21-32)  mEq/L


 


Anion Gap  18.9 H    (5-15)  


 


BUN  27 H    (7-18)  mg/dL


 


Creatinine  1.7 H    (0.7-1.3)  mg/dL


 


Est Cr Clr Drug Dosing  33.53    mL/min


 


Estimated GFR (MDRD)  39    (>60)  mL/min


 


BUN/Creatinine Ratio  15.9    (14-18)  


 


Glucose  80    (70-99)  mg/dL


 


Calcium  8.1 L    (8.5-10.1)  mg/dL


 


Magnesium  1.3 L    (1.8-2.4)  mg/dL


 


Total Bilirubin  0.3    (0.2-1.0)  mg/dL


 


AST  15    (15-37)  U/L


 


ALT  17    (16-63)  U/L


 


Alkaline Phosphatase  89    ()  U/L


 


Troponin I     (0.00-0.056)  ng/mL


 


NT-Pro-B Natriuret Pep   3550 H   (0-450)  pg/mL


 


Total Protein  6.0 L    (6.4-8.2)  g/dl


 


Albumin  2.3 L    (3.4-5.0)  g/dl


 


Globulin  3.7    gm/dL


 


Albumin/Globulin Ratio  0.6 L    (1-2)  


 


TSH 3rd Generation  1.960    (0.358-3.74)  uIU/mL


 


SARS-CoV-2 RNA (YURI)     (NEGATIVE)  


 


MRSA (PCR)     











Med Orders - Current: 


                               Current Medications





Acetaminophen (Acetaminophen 325 Mg Tab)  650 mg PO Q6H PRN


   PRN Reason: Pain (Mild 1-3)/fever


Albuterol/Ipratropium (Albuterol/Ipratropium 3.0-0.5 Mg/3 Ml Neb Soln)  3 ml NEB

Q4H PRN


   PRN Reason: Shortness Of Breath/wheezing


Atorvastatin Calcium (Atorvastatin 40 Mg Tab)  40 mg PO BEDTIME [facility]


Calcium Carbonate/Glycine (Calcium Carbonate 500 Mg Tab.Chew)  1,500 mg PO DAILY

[facility]


   Last Admin: 11/17/21 18:29 Dose:  Not Given


   Documented by: 


Cholestyramine Resin (Cholestyramine/Sucrose Powder 4 Gm Packet)  4 gm PO DAILY 

[facility]


Clopidogrel Bisulfate (Clopidogrel 75 Mg Tab)  75 mg PO DAILY [facility]


Enoxaparin Sodium (Enoxaparin 40 Mg/0.4 Ml Syringe)  40 mg SUBCUT DAILY [facility]


   Last Admin: 11/17/21 18:29 Dose:  Not Given


   Documented by: 


Hydralazine HCl (Hydralazine 20 Mg/Ml Sdv)  10 mg IVPUSH Q4H PRN


   PRN Reason: Hypertension


   Last Admin: 11/17/21 21:56 Dose:  10 mg


   Documented by: 


Hydrochlorothiazide (Hydrochlorothiazide 12.5 Mg Cap)  12.5 mg PO DAILY [facility]


Lactated Ringer's (Ringers, Lactated)  1,000 mls @ 65 mls/hr IV ASDIRECTED [facility]


   Last Admin: 11/17/21 21:55 Dose:  65 mls/hr


   Documented by: 


Promethazine HCl 6.25 mg/ (Sodium Chloride)  50.25 mls @ 100 mls/hr IV Q6H PRN


   PRN Reason: Nausea/Vomiting


Magnesium Sulfate 2 gm/ Premix  50 mls @ 25 mls/hr IV ONETIME ONE


   Stop: 11/18/21 10:10


Levothyroxine Sodium (Levothyroxine 50 Mcg Tab)  50 mcg PO ACBREAKFAST [facility]


Magnesium Oxide (Magnesium Oxide 400 Mg Tab)  400 mg PO BID [facility]


Mirtazapine (Mirtazapine 15 Mg Tab)  15 mg PO BEDTIME [facility]


Morphine Sulfate (Morphine 2 Mg/Ml Syringe)  2 mg IVPUSH Q4H PRN


   PRN Reason: Pain (severe 7-10)


   Stop: 11/18/21 16:48


Multivitamins/Minerals/Vitamin C (Multivitamin Tab)  1 tab PO DAILY [facility]


Non-Formulary Medication (Abiraterone Acetate [Zytiga])  500 mg PO DAILY [facility]


Non-Formulary Medication (Ferrous Sulfate [Slow Fe])  142 mg PO DAILY [facility]


Non-Formulary Medication (Fluoxetine [Prozac])  40 mg PO DAILY [facility]


Non-Formulary Medication (Budesonide/Formoterol)  2 puff INH BID [facility]


Non-Formulary Medication (Albuterol/Ipratropium)  1 puff INH Q6HR PRN


   PRN Reason: Wheezing


Oxycodone HCl (Oxycodone 5 Mg Tab)  5 mg PO Q6H PRN


   PRN Reason: Pain (moderate 4-6)


Pantoprazole Sodium (Pantoprazole 40 Mg Tab.Cr)  40 mg PO DAILY [facility]


Polyethylene Glycol (Polyethylene Glycol 3350 Powder 17 Gm Packet)  17 gm PO 

DAILY PRN


   PRN Reason: Constipation


Senna (Sennosides 8.6 Mg Tab)  8.6 mg PO DAILY PRN


   PRN Reason: Constipation


Simethicone (Simethicone 80 Mg Tab.Chew)  80 mg PO QID [facility]


   Last Admin: 11/17/21 21:42 Dose:  80 mg


   Documented by: 


Sodium Chloride (Sodium Chloride 0.9% 10 Ml Syringe)  10 ml FLUSH ONETIME PRN


   PRN Reason: IV FLUSH


   Last Admin: 11/17/21 14:35 Dose:  10 ml


   Documented by: 


Tamsulosin HCl (Tamsulosin 0.4 Mg Cap.Er)  0.8 mg PO BEDTIME [facility]


Triamcinolone Acetonide (Triamcinolone Acetonide 0.1% Crm 15 Gm Tube)   gm TOP 

BID PRN


   PRN Reason: Itching





Discontinued Medications





Atorvastatin Calcium (Atorvastatin 40 Mg Tab)  40 mg PO ONETIME ONE


   Stop: 11/17/21 21:01


   Last Admin: 11/17/21 21:41 Dose:  40 mg


   Documented by: 


Diatrizoate Meglum/Diatrizoate Sod (Diatrizoate Meglumine/Diatrizoate Sodium 37%

 120 Ml Bottle)  120 ml PO ONETIME ONE


   Stop: 11/17/21 13:34


   Last Admin: 11/17/21 18:29 Dose:  Not Given


   Documented by: 


Sodium Chloride (Normal Saline)  500 mls @ 500 mls/hr IV .BOLUS ONE


   Stop: 11/17/21 15:17


   Last Admin: 11/17/21 15:00 Dose:  500 mls/hr


   Documented by: 


Magnesium Sulfate 4 gm/ Premix  50 mls @ 12.5 mls/hr IV ONETIME ONE


   Stop: 11/17/21 18:37


   Last Admin: 11/17/21 15:00 Dose:  12.5 mls/hr


   Documented by: 


Iopamidol (Iopamidol 612 Mg/Ml 100 Ml Bottle)  100 ml IVPUSH ONETIME ONE


   Stop: 11/17/21 13:34


   Last Admin: 11/17/21 14:34 Dose:  100 ml


   Documented by: 


Mirtazapine (Mirtazapine 15 Mg Tab)  15 mg PO ONETIME ONE


   Stop: 11/17/21 20:31


   Last Admin: 11/17/21 21:42 Dose:  15 mg


   Documented by: 


Potassium Chloride (Potassium Chloride 20 Meq Tab.Er)  40 meq PO ONETIME ONE


   Stop: 11/17/21 14:40


   Last Admin: 11/17/21 15:00 Dose:  40 meq


   Documented by: 


Tamsulosin HCl (Tamsulosin 0.4 Mg Cap.Er)  0.8 mg PO ONETIME ONE


   Stop: 11/17/21 21:01


   Last Admin: 11/17/21 21:42 Dose:  0.8 mg


   Documented by: 











- Exam


General: Reports: Alert, Oriented


HEENT: Reports: Pupils Equal, Mucous Membr. Moist/Pink


Neck: Reports: Supple


Lungs: Reports: Clear to Auscultation, Normal Respiratory Effort


Cardiovascular: Reports: Regular Rate, Regular Rhythm


GI/Abdominal Exam: Normal Bowel Sounds, Soft, Non-Tender, No Distention


Extremities: Normal Inspection, No Pedal Edema, Normal Capillary Refill


Skin: Reports: Warm, Dry, Intact


Psy/Mental Status: Reports: Alert, Normal Affect, Normal Mood

## 2021-12-06 NOTE — EDM.PDOC
ED HPI GENERAL MEDICAL PROBLEM





- General


Chief Complaint: Genitourinary Problem


Stated Complaint: RG MEDICAL


Time Seen by Provider: 12/06/21 12:32


Source of Information: Reports: Patient, RN Notes Reviewed


History Limitations: Reports: No Limitations





- History of Present Illness


INITIAL COMMENTS - FREE TEXT/NARRATIVE: 





Patient is an 81-year-old male presenting to the emergency department from 

[facility] with complaints of inability to reinsert Rogers 

catheter.  Patient pulled his catheter out earlier.  Nursing staff attempted to 

reinsert but were unsuccessful.  The do not have coud-tip catheters in their 

facility, therefore they sent him here to have it reinserted.  Patient denies 

any pain.  He has had no urethral bleeding.





- Related Data


                                    Allergies











Allergy/AdvReac Type Severity Reaction Status Date / Time


 


No Known Allergies Allergy   Verified 11/17/21 12:27











Home Meds: 


                                    Home Meds





Calcium Carbonate 1,500 mg PO DAILY 05/11/20 [History]


Clopidogrel [Plavix] 75 mg PO DAILY 05/11/20 [History]


FLUoxetine [PROzac] 40 mg PO DAILY 05/11/20 [History]


Multivitamin [Multivitamins] 1 tab PO DAILY 05/11/20 [History]


Vitamin E 400 units PO DAILY 05/11/20 [History]


atorvaSTATin Calcium [Lipitor] 40 mg PO BEDTIME 05/11/20 [History]


Cholecalciferol (Vitamin D3) [Vitamin D3] 1,000 unit PO DAILY 10/20/20 [History]


Levothyroxine [Synthroid] 50 mcg PO ACBREAKFAST 10/20/20 [History]


Loperamide HCl [Imodium A-D] 2 mg PO ASDIRECTED PRN 10/20/20 [History]


Simethicone 80 mg PO QID 10/20/20 [History]


Tamsulosin HCl [Flomax] 0.8 mg PO BEDTIME 10/20/20 [History]


polyethylene glycoL 3350 [MiraLAX] 17 gm PO DAILY PRN 10/20/20 [History]


Acetaminophen [Aphen] 2 tab PO Q4H PRN 09/10/21 [History]


Alum Hydrox/Mag Hydrox/Simeth [Mag-Al Plus] 20 ml PO ASDIRECTED PRN 09/10/21 

[History]


Ascorbic Acid [C-500] 500 mg PO DAILY 09/10/21 [History]


Cholestyramine/Sucrose [Cholestyramine] 4 gm PO DAILY 09/10/21 [History]


Ferrous Sulfate [Slow Fe] 142 mg PO DAILY 09/10/21 [History]


Hydrocortisone [Hydrocortisone 1% Oint] 1 applic TOP BID PRN 09/10/21 [History]


Losartan [Cozaar] 50 mg PO DAILY 09/10/21 [History]


Megestrol [Megace 40 MG/ML Susp] 400 mg PO DAILY 09/10/21 [History]


Mirtazapine 15 mg PO BEDTIME 09/10/21 [History]


Ondansetron [Zofran ODT] 1 tab PO QID PRN 09/10/21 [History]


Pramoxine HCl [Cerave Itch Relief] 1 applic TOP BID PRN 09/10/21 [History]


Triamcinolone Acetonide [Triamcinolone Acetonide 0.1% Crm] 1 applic TOP BID PRN 

09/10/21 [History]


Magnesium Oxide 400 mg PO QAM 09/15/21 [History]


Calcium Carbonate [Tums] 500 mg PO DAILY PRN 10/21/21 [History]


Sennosides [Senna] 8.6 mg PO DAILY PRN 10/21/21 [History]


Albuterol/Ipratropium [Combivent Respimat] 1 puff INH Q6HR PRN 11/17/21 

[History]


Budesonide/Formoterol [Symbicort 160-4.5 MCG] 2 puff INH BID 11/17/21 [History]


Pantoprazole [ProTONIX***] 40 mg PO DAILY 11/17/21 [History]


Abiraterone Acetate [Zytiga] 500 mg PO DAILY 11/18/21 [Rx]











Past Medical History


HEENT History: Reports: Cataract, Impaired Vision


Cardiovascular History: Reports: High Cholesterol, Hypertension, MI


Respiratory History: Reports: None


Gastrointestinal History: Reports: GERD


Other Gastrointestinal History: ulcer


Genitourinary History: Reports: BPH


Other Genitourinary History: Prostate Cancer


Musculoskeletal History: Reports: Arthritis


Neurological History: Reports: CVA, Other (See Below)


Other Neuro History: stroke 7 months ago


Psychiatric History: Reports: Addiction, Suicide Attempt


Other Psychiatric History: Suicide attempt 3/1/19-3/2/19


Endocrine/Metabolic History: Reports: Osteopenia


Hematologic History: Reports: None


Immunologic History: Reports: None


Oncologic (Cancer) History: Reports: Prostate


Dermatologic History: Reports: None





- Infectious Disease History


Infectious Disease History: Reports: Chicken Pox





- Past Surgical History


HEENT Surgical History: Reports: Eye Surgery, Oral Surgery, Tonsillectomy


Cardiovascular Surgical History: Reports: None


GI Surgical History: Reports: Colonoscopy


Other GI Surgeries/Procedures: Colonoscopy done 11/16/21 patient reported.


Male  Surgical History: Reports: Other (See Below)


Other Male  Surgeries/Procedures: patient has seeds placed for prostrate 

cancer.


Musculoskeletal Surgical History: Reports: None


Oncologic Surgical History: Reports: Other (See Below)


Other Oncologic Surgeries/Procedures: Prostate biopsy x2





Social & Family History





- Family History


Family Medical History: No Pertinent Family History


HEENT: Reports: Impaired Vision


Cardiac: Reports: None


Respiratory: Reports: None


Musculoskeletal: Reports: Arthritis


Neurological: Reports: Alzheimers Disease, Cerebral Aneurysms


Endocrine/Metabolic: Reports: None


Oncologic: Reports: Breast, Prostate





- Caffeine Use


Caffeine Use: Reports: Coffee, Soda


Other Caffeine Use: Daily





- Living Situation & Occupation


Living situation: Reports: Single


Occupation: Retired





ED ROS GENERAL





- Review of Systems


Review Of Systems: Comprehensive ROS is negative, except as noted in HPI.





ED EXAM, RENAL/





- Physical Exam


Exam: See Below


Exam Limited By: No Limitations


General Appearance: Alert, WD/WN, No Apparent Distress


Respiratory/Chest: No Respiratory Distress, Lungs Clear, Normal Breath Sounds, 

No Accessory Muscle Use, Chest Non-Tender


Cardiovascular: Normal Peripheral Pulses, Regular Rate, Rhythm, No Edema, No 

Gallop, No JVD, No Murmur, No Rub


Neurological: Alert, Oriented


Psychiatric: Normal Affect, Normal Mood


Skin Exam: Warm, Dry, Intact, Normal Color, No Rash





Course





- Vital Signs


Last Recorded V/S: 


                                Last Vital Signs











Temp  98.6 F   12/06/21 12:29


 


Pulse  92   12/06/21 12:29


 


Resp  16   12/06/21 12:29


 


BP  132/76   12/06/21 12:29


 


Pulse Ox  96   12/06/21 12:29














- Orders/Labs/Meds


Meds: 


Medications














Discontinued Medications














Generic Name Dose Route Start Last Admin





  Trade Name Freq  PRN Reason Stop Dose Admin


 


Lidocaine HCl  10 ml  12/06/21 12:47  12/06/21 13:53





  Lidocaine 2% Jelly 10 Ml Urojet  MUCMEM  12/06/21 12:48  10 ml





  ONETIME ONE   Administration














- Re-Assessments/Exams


Free Text/Narrative Re-Assessment/Exam: 


Patient is an 81-year-old male presenting to the emergency department from 

[facility] to have Rogers catheter replaced.  Patient 

pulled his catheter out and they were unable to reinsert it.  He has a history 

of prostate cancer.  Normally uses an 18 French catheter.  I have ordered Urojet

 and Rogers catheter insertion.


12/06/21 14:00   


YESENIA Patel, was able to insert a coud tip Rogers catheter with little 

difficulty.  Patient will be discharged back to the [facility] 

with routine catheter care.  Discharge instructions as documented.





Departure





- Departure


Time of Disposition: 20:08


Disposition: DC/Tfer to [facility] 03


Condition: Good


Clinical Impression: 


Rogers catheter problem


Qualifiers:


 Encounter type: initial encounter Qualified Code(s): T83.9XXA - Unspecified 

complication of genitourinary prosthetic device, implant and graft, initial 

encounter








- Discharge Information


*PRESCRIPTION DRUG MONITORING PROGRAM REVIEWED*: No


*COPY OF PRESCRIPTION DRUG MONITORING REPORT IN PATIENT JULIA: No


Instructions:  Indwelling Urinary Catheter Care, Adult


Referrals: 


Dominic Brandt MD [Primary Care Provider] - 


Forms:  ED Department Discharge


Additional Instructions: 


Continue with routine catheter care.





Return to ER as needed.





Sepsis Event Note (ED)





- Evaluation


Sepsis Screening Result: No Definite Risk